# Patient Record
Sex: FEMALE | Race: WHITE | NOT HISPANIC OR LATINO | Employment: FULL TIME | URBAN - NONMETROPOLITAN AREA
[De-identification: names, ages, dates, MRNs, and addresses within clinical notes are randomized per-mention and may not be internally consistent; named-entity substitution may affect disease eponyms.]

---

## 2020-09-16 ENCOUNTER — OFFICE VISIT (OUTPATIENT)
Dept: OBSTETRICS AND GYNECOLOGY | Facility: CLINIC | Age: 45
End: 2020-09-16
Payer: MEDICAID

## 2020-09-16 VITALS
SYSTOLIC BLOOD PRESSURE: 144 MMHG | HEART RATE: 72 BPM | WEIGHT: 214 LBS | HEIGHT: 65 IN | DIASTOLIC BLOOD PRESSURE: 92 MMHG | BODY MASS INDEX: 35.65 KG/M2

## 2020-09-16 DIAGNOSIS — F41.1 GENERALIZED ANXIETY DISORDER: ICD-10-CM

## 2020-09-16 DIAGNOSIS — E89.40 SURGICAL MENOPAUSE ON HORMONE REPLACEMENT THERAPY: ICD-10-CM

## 2020-09-16 DIAGNOSIS — F33.41 RECURRENT MAJOR DEPRESSIVE DISORDER, IN PARTIAL REMISSION: ICD-10-CM

## 2020-09-16 DIAGNOSIS — Z72.0 TOBACCO ABUSE: Primary | ICD-10-CM

## 2020-09-16 DIAGNOSIS — Z79.890 SURGICAL MENOPAUSE ON HORMONE REPLACEMENT THERAPY: ICD-10-CM

## 2020-09-16 PROCEDURE — 99999 PR PBB SHADOW E&M-NEW PATIENT-LVL III: ICD-10-PCS | Mod: PBBFAC,,, | Performed by: OBSTETRICS & GYNECOLOGY

## 2020-09-16 PROCEDURE — 99203 OFFICE O/P NEW LOW 30 MIN: CPT | Mod: PBBFAC | Performed by: OBSTETRICS & GYNECOLOGY

## 2020-09-16 PROCEDURE — 99213 PR OFFICE/OUTPT VISIT, EST, LEVL III, 20-29 MIN: ICD-10-PCS | Mod: S$PBB,,, | Performed by: OBSTETRICS & GYNECOLOGY

## 2020-09-16 PROCEDURE — 99999 PR PBB SHADOW E&M-NEW PATIENT-LVL III: CPT | Mod: PBBFAC,,, | Performed by: OBSTETRICS & GYNECOLOGY

## 2020-09-16 PROCEDURE — 99213 OFFICE O/P EST LOW 20 MIN: CPT | Mod: S$PBB,,, | Performed by: OBSTETRICS & GYNECOLOGY

## 2020-09-16 RX ORDER — ESTRADIOL 2 MG/1
2 TABLET ORAL DAILY
Qty: 30 TABLET | Refills: 5 | Status: SHIPPED | OUTPATIENT
Start: 2020-09-16 | End: 2021-03-19 | Stop reason: SDUPTHER

## 2020-09-16 RX ORDER — ESCITALOPRAM OXALATE 20 MG/1
TABLET ORAL
COMMUNITY
Start: 2020-08-17 | End: 2020-09-16 | Stop reason: SDUPTHER

## 2020-09-16 RX ORDER — ESCITALOPRAM OXALATE 20 MG/1
20 TABLET ORAL DAILY
Qty: 30 TABLET | Refills: 5 | Status: SHIPPED | OUTPATIENT
Start: 2020-09-16 | End: 2021-02-18 | Stop reason: SDUPTHER

## 2020-09-16 RX ORDER — CLONAZEPAM 1 MG/1
TABLET ORAL
COMMUNITY
Start: 2020-08-17 | End: 2020-09-16 | Stop reason: SDUPTHER

## 2020-09-16 RX ORDER — CLONAZEPAM 1 MG/1
1 TABLET ORAL NIGHTLY
Qty: 30 TABLET | Refills: 3 | Status: SHIPPED | OUTPATIENT
Start: 2020-09-16 | End: 2020-12-16 | Stop reason: SDUPTHER

## 2020-09-16 NOTE — PROGRESS NOTES
Subjective:    Patient ID: Mena Mcdaniel is a 45 y.o. y.o. female    Chief Complaint:   Chief Complaint   Patient presents with    Medication Refill     lexapro,estradiol and klonpin       History of Present Illness:  Mena presents today for follow up of hormone replacement therapy and anxiety and depression meds.  She states that she is doing great.  Wishes to continue meds.      Review of Systems   Constitutional: Negative for chills, fatigue and fever.   Respiratory: Negative for shortness of breath.    Cardiovascular: Negative for chest pain.   Gastrointestinal: Negative for abdominal pain, constipation, diarrhea and nausea.   Genitourinary: Negative for bladder incontinence, dysuria, hot flashes, pelvic pain and vaginal bleeding.   Neurological: Negative for headaches.   Psychiatric/Behavioral: Negative for depression.         Objective:    Vital Signs:  Vitals:    09/16/20 1020   BP: (!) 144/92   Pulse: 72       Physical Exam:  General:  alert, no distress   Skin:  Skin color, texture, turgor normal. No rashes or lesions   Neck: supple, trachea midline, no adenopathy or thyromegaly   Respiratory:  clear to auscultation bilaterally   Heart:  regular rate and rhythm, S1, S2 normal, no murmur, click, rub or gallop   Abdomen:  Soft, non-tender. Bowel sounds normal. No masses,  no organomegaly   Extremities: No cyanosis, clubbing, edema               Assessment:      1. Tobacco abuse    2. Surgical menopause on hormone replacement therapy    3. Generalized anxiety disorder    4. Recurrent major depressive disorder, in partial remission          Plan:      Tobacco abuse    Surgical menopause on hormone replacement therapy    Generalized anxiety disorder    Recurrent major depressive disorder, in partial remission    Other orders  -     clonazePAM (KLONOPIN) 1 MG tablet; Take 1 tablet (1 mg total) by mouth every evening.  Dispense: 30 tablet; Refill: 3  -     escitalopram oxalate (LEXAPRO) 20 MG tablet;  Take 1 tablet (20 mg total) by mouth once daily.  Dispense: 30 tablet; Refill: 5  -     estradioL (ESTRACE) 2 MG tablet; Take 1 tablet (2 mg total) by mouth once daily.  Dispense: 30 tablet; Refill: 5          Rosita Tavares MD [facility]    09/16/2020  10:45 AM

## 2020-09-16 NOTE — PATIENT INSTRUCTIONS
Tips for Quitting Smoking (Cardiovascular)  Quitting smoking is a gift to yourself, one of the best things you can do to keep your heart disease from getting worse. Smoking reduces oxygen flow to your heart by speeding the buildup of plaque and changing the health of your blood vessels. This increases your risk for heart attack, also known as acute myocardial infarction, or AMI. Quitting helps reduce smoking's harmful effects. You may have tried to quit before, but dont give up. Try again. Many smokers try 4 or 5 times before they succeed. It is never too early to benefit from smoking cessation, especially if you already have chronic conditions such as high blood pressure and high cholesterol that put you at increased risk for cardiovascular disease.     Youll have the best chance of success if you join a stop-smoking group and have the support of your doctor, family and friends.     Line up help  · Ask for the support of your family and friends.  · Join a smoking cessation class, or ask your healthcare provider for a referral to a psychologist who specializes in helping people quit smoking.   · Ask your healthcare provider about nicotine replacement products and prescription medicines that can help you quit.  Set a quit date  · Choose a date within the next 2 to 4 weeks.  · After picking a day, eleazar it in bold letters on a calendar.     Your quit list  Ideas to stop smoking include:  1. Start by giving up cigarettes at the times you least need them.  2. Keeping a piece of fruit close by at the times you are most vulnerable to reach for a cigarette.  3. Using a nicotine replacement product instead of a cigarette.  Write down a few more ideas.     Set limits  · Limit where you can smoke. Pick one room or a porch, and smoke only in that place.  · Make smoking outdoors a house rule. Other smokers wont tempt you as much.  · Speak to smokers around you about your intent to stop smoking so they can show consideration  for you and limit their smoking around you.  · Hang a list of  quit benefits in the spot where you smoke. Put one on the refrigerator and one on your car dashboard.     For more information  · smokefree.gov/plfx-sa-mq-expert  · National [employer] [place] Smoking Quitline: 877-44U-QUIT (741-799-3522)      Date Last Reviewed: 3/26/2016  © 9241-4767 [employer]. [address] [place] [address], [place] [address]. All rights reserved. This information is not intended as a substitute for professional medical care. Always follow your healthcare professional's instructions.

## 2020-10-01 ENCOUNTER — HOSPITAL ENCOUNTER (EMERGENCY)
Facility: HOSPITAL | Age: 45
Discharge: HOME | End: 2020-10-01
Attending: EMERGENCY MEDICINE
Payer: MEDICAID

## 2020-10-01 VITALS
TEMPERATURE: 98 F | RESPIRATION RATE: 16 BRPM | BODY MASS INDEX: 34.49 KG/M2 | DIASTOLIC BLOOD PRESSURE: 80 MMHG | HEIGHT: 65 IN | HEART RATE: 73 BPM | OXYGEN SATURATION: 99 % | SYSTOLIC BLOOD PRESSURE: 138 MMHG | WEIGHT: 207 LBS

## 2020-10-01 DIAGNOSIS — H92.01 OTALGIA, RIGHT EAR: ICD-10-CM

## 2020-10-01 DIAGNOSIS — J02.9 SORE THROAT: Primary | ICD-10-CM

## 2020-10-01 PROCEDURE — 63600175 PHARM REV CODE 636 W HCPCS: Performed by: NURSE PRACTITIONER

## 2020-10-01 PROCEDURE — 96372 THER/PROPH/DIAG INJ SC/IM: CPT

## 2020-10-01 PROCEDURE — 99284 EMERGENCY DEPT VISIT MOD MDM: CPT | Mod: 25

## 2020-10-01 PROCEDURE — 25000003 PHARM REV CODE 250: Performed by: NURSE PRACTITIONER

## 2020-10-01 RX ORDER — LORATADINE 10 MG/1
10 TABLET ORAL DAILY
Qty: 30 TABLET | Refills: 0 | Status: SHIPPED | OUTPATIENT
Start: 2020-10-01 | End: 2023-09-20 | Stop reason: SDUPTHER

## 2020-10-01 RX ORDER — IBUPROFEN 800 MG/1
800 TABLET ORAL EVERY 6 HOURS PRN
Qty: 20 TABLET | Refills: 0 | Status: SHIPPED | OUTPATIENT
Start: 2020-10-01 | End: 2021-09-21

## 2020-10-01 RX ORDER — BETAMETHASONE SODIUM PHOSPHATE AND BETAMETHASONE ACETATE 3; 3 MG/ML; MG/ML
6 INJECTION, SUSPENSION INTRA-ARTICULAR; INTRALESIONAL; INTRAMUSCULAR; SOFT TISSUE
Status: COMPLETED | OUTPATIENT
Start: 2020-10-01 | End: 2020-10-01

## 2020-10-01 RX ORDER — IBUPROFEN 800 MG/1
800 TABLET ORAL
Status: COMPLETED | OUTPATIENT
Start: 2020-10-01 | End: 2020-10-01

## 2020-10-01 RX ADMIN — IBUPROFEN 800 MG: 800 TABLET, FILM COATED ORAL at 08:10

## 2020-10-01 RX ADMIN — BETAMETHASONE SODIUM PHOSPHATE AND BETAMETHASONE ACETATE 6 MG: 3; 3 INJECTION, SUSPENSION INTRA-ARTICULAR; INTRALESIONAL; INTRAMUSCULAR; SOFT TISSUE at 08:10

## 2020-10-01 NOTE — Clinical Note
"Mena Christiansone" Violeta was seen and treated in our emergency department on 10/1/2020.  She may return to work on 10/02/2020.       If you have any questions or concerns, please don't hesitate to call.      Jamia Casey NP"

## 2020-10-01 NOTE — Clinical Note
"Mena Christiansone" Violeta was seen and treated in our emergency department on 10/1/2020.  She may return to work on 10/05/2020.       If you have any questions or concerns, please don't hesitate to call.      shelly wolfe RN    "

## 2020-10-02 NOTE — ED PROVIDER NOTES
Encounter Date: 10/1/2020       History     Chief Complaint   Patient presents with    Otalgia     Sore throat and ear pain since this am.     Sore Throat     Pt c/o sore throat and right ear pain starting today.  Pt states she has mild pain in her throat when swallowing but denies difficulty swallowing.  Pt denies injury to her ear or drainage from her right ear.  Pt states it is an aching type pain.  Pt denies cough, congestion, fever, N/V/D.  Pt states she took Tylenol earlier today with no relief.  Pt states she had to miss work today and is requesting an excuse.         Review of patient's allergies indicates:   Allergen Reactions    Ceclor [cefaclor]     Clindamycin     Pcn [penicillins]     Tramadol      Past Medical History:   Diagnosis Date    Menopause      Past Surgical History:   Procedure Laterality Date    COLD KNIFE CONIZATION OF CERVIX      HYSTERECTOMY      TONSILLECTOMY      TONSILLECTOMY       Family History   Family history unknown: Yes     Social History     Tobacco Use    Smoking status: Current Every Day Smoker     Packs/day: 0.50     Types: Cigarettes    Smokeless tobacco: Never Used   Substance Use Topics    Alcohol use: Not Currently     Alcohol/week: 0.0 standard drinks    Drug use: Never     Review of Systems   HENT: Positive for ear pain and sore throat.    All other systems reviewed and are negative.      Physical Exam     Initial Vitals [10/01/20 2015]   BP Pulse Resp Temp SpO2   138/80 73 16 98.1 °F (36.7 °C) 99 %      MAP       --         Physical Exam    Nursing note and vitals reviewed.  Constitutional: She appears well-developed and well-nourished. She is not diaphoretic. No distress.   HENT:   Head: Normocephalic.   Right Ear: Hearing, tympanic membrane, external ear and ear canal normal. There is tenderness.   Left Ear: Hearing, tympanic membrane, external ear and ear canal normal.   Nose: Nose normal. Right sinus exhibits no maxillary sinus tenderness and no  frontal sinus tenderness. Left sinus exhibits no maxillary sinus tenderness and no frontal sinus tenderness.   Mouth/Throat: Uvula is midline, oropharynx is clear and moist and mucous membranes are normal. No oropharyngeal exudate, posterior oropharyngeal edema or posterior oropharyngeal erythema.   Eyes: Pupils are equal, round, and reactive to light.   Neck: Normal range of motion. Neck supple.   Cardiovascular: Normal rate.   Pulmonary/Chest: Breath sounds normal. No respiratory distress. She has no wheezes.   Abdominal: Soft. Bowel sounds are normal. She exhibits no distension. There is no abdominal tenderness.   Musculoskeletal: Normal range of motion. No tenderness or edema.   Neurological: She is alert and oriented to person, place, and time. She has normal strength. GCS score is 15. GCS eye subscore is 4. GCS verbal subscore is 5. GCS motor subscore is 6.   Skin: Skin is dry.         ED Course   Procedures  Labs Reviewed - No data to display       Imaging Results    None          Medical Decision Making:   Differential Diagnosis:   Otalgia  Otitis media  Pharyngitis  Viral illness                               Clinical Impression:     ICD-10-CM ICD-9-CM   1. Sore throat  J02.9 462   2. Otalgia, right ear  H92.01 388.70                      Disposition:   Disposition: Discharged  Condition: Stable     ED Disposition Condition    Discharge Stable        ED Prescriptions     Medication Sig Dispense Start Date End Date Auth. Provider    ibuprofen (ADVIL,MOTRIN) 800 MG tablet Take 1 tablet (800 mg total) by mouth every 6 (six) hours as needed for Pain. 20 tablet 10/1/2020  Jamia Casey NP    loratadine (CLARITIN) 10 mg tablet Take 1 tablet (10 mg total) by mouth once daily. 30 tablet 10/1/2020 10/31/2020 Jamia Casey NP        Follow-up Information     Follow up With Specialties Details Why Contact Info    [facility]  Schedule an appointment as soon as possible for a visit in 2 days CONTINUATION OF  CARE, fever, Further evaluation and treatment, If symptoms worsen, re-evaluation [address]  [facility] [address]  [phone]                                       Jamia Casey NP  10/01/20 2046

## 2020-12-17 RX ORDER — CLONAZEPAM 1 MG/1
1 TABLET ORAL NIGHTLY
Qty: 30 TABLET | Refills: 1 | Status: SHIPPED | OUTPATIENT
Start: 2020-12-17 | End: 2021-03-19 | Stop reason: SDUPTHER

## 2021-02-18 RX ORDER — ESCITALOPRAM OXALATE 20 MG/1
20 TABLET ORAL DAILY
Qty: 30 TABLET | Refills: 5 | Status: SHIPPED | OUTPATIENT
Start: 2021-02-18 | End: 2021-03-19 | Stop reason: SDUPTHER

## 2021-03-19 ENCOUNTER — OFFICE VISIT (OUTPATIENT)
Dept: OBSTETRICS AND GYNECOLOGY | Facility: CLINIC | Age: 46
End: 2021-03-19
Payer: MEDICAID

## 2021-03-19 VITALS
BODY MASS INDEX: 37.15 KG/M2 | HEIGHT: 65 IN | SYSTOLIC BLOOD PRESSURE: 122 MMHG | DIASTOLIC BLOOD PRESSURE: 102 MMHG | WEIGHT: 223 LBS | HEART RATE: 75 BPM

## 2021-03-19 DIAGNOSIS — F41.1 GENERALIZED ANXIETY DISORDER: ICD-10-CM

## 2021-03-19 DIAGNOSIS — Z72.0 TOBACCO ABUSE: Primary | ICD-10-CM

## 2021-03-19 DIAGNOSIS — Z78.0 MENOPAUSE: ICD-10-CM

## 2021-03-19 DIAGNOSIS — E89.40 SURGICAL MENOPAUSE ON HORMONE REPLACEMENT THERAPY: ICD-10-CM

## 2021-03-19 DIAGNOSIS — Z79.890 SURGICAL MENOPAUSE ON HORMONE REPLACEMENT THERAPY: ICD-10-CM

## 2021-03-19 DIAGNOSIS — F33.41 RECURRENT MAJOR DEPRESSIVE DISORDER, IN PARTIAL REMISSION: ICD-10-CM

## 2021-03-19 PROCEDURE — 99999 PR PBB SHADOW E&M-EST. PATIENT-LVL III: CPT | Mod: PBBFAC,,, | Performed by: OBSTETRICS & GYNECOLOGY

## 2021-03-19 PROCEDURE — 99213 OFFICE O/P EST LOW 20 MIN: CPT | Mod: S$PBB,,, | Performed by: OBSTETRICS & GYNECOLOGY

## 2021-03-19 PROCEDURE — 99213 OFFICE O/P EST LOW 20 MIN: CPT | Mod: PBBFAC | Performed by: OBSTETRICS & GYNECOLOGY

## 2021-03-19 PROCEDURE — 99999 PR PBB SHADOW E&M-EST. PATIENT-LVL III: ICD-10-PCS | Mod: PBBFAC,,, | Performed by: OBSTETRICS & GYNECOLOGY

## 2021-03-19 PROCEDURE — 99213 PR OFFICE/OUTPT VISIT, EST, LEVL III, 20-29 MIN: ICD-10-PCS | Mod: S$PBB,,, | Performed by: OBSTETRICS & GYNECOLOGY

## 2021-03-19 RX ORDER — ESCITALOPRAM OXALATE 20 MG/1
20 TABLET ORAL DAILY
Qty: 30 TABLET | Refills: 5 | Status: SHIPPED | OUTPATIENT
Start: 2021-03-19 | End: 2021-07-29 | Stop reason: SDUPTHER

## 2021-03-19 RX ORDER — CLONAZEPAM 1 MG/1
1 TABLET ORAL NIGHTLY
Qty: 30 TABLET | Refills: 1 | Status: SHIPPED | OUTPATIENT
Start: 2021-03-19 | End: 2021-04-16 | Stop reason: SDUPTHER

## 2021-03-19 RX ORDER — ESTRADIOL 2 MG/1
2 TABLET ORAL DAILY
Qty: 30 TABLET | Refills: 5 | Status: SHIPPED | OUTPATIENT
Start: 2021-03-19 | End: 2021-07-29 | Stop reason: SDUPTHER

## 2021-04-16 DIAGNOSIS — F41.1 GENERALIZED ANXIETY DISORDER: ICD-10-CM

## 2021-04-16 RX ORDER — CLONAZEPAM 1 MG/1
1 TABLET ORAL NIGHTLY
Qty: 30 TABLET | Refills: 1 | Status: SHIPPED | OUTPATIENT
Start: 2021-04-16 | End: 2021-05-18 | Stop reason: SDUPTHER

## 2021-05-18 DIAGNOSIS — F41.1 GENERALIZED ANXIETY DISORDER: ICD-10-CM

## 2021-05-18 RX ORDER — CLONAZEPAM 1 MG/1
1 TABLET ORAL NIGHTLY
Qty: 30 TABLET | Refills: 1 | Status: SHIPPED | OUTPATIENT
Start: 2021-05-18 | End: 2021-07-18

## 2021-06-16 ENCOUNTER — HOSPITAL ENCOUNTER (EMERGENCY)
Facility: HOSPITAL | Age: 46
Discharge: HOME | End: 2021-06-16
Attending: FAMILY MEDICINE
Payer: MEDICAID

## 2021-06-16 VITALS
HEIGHT: 63 IN | BODY MASS INDEX: 39.87 KG/M2 | TEMPERATURE: 98 F | DIASTOLIC BLOOD PRESSURE: 82 MMHG | WEIGHT: 225 LBS | SYSTOLIC BLOOD PRESSURE: 142 MMHG | RESPIRATION RATE: 18 BRPM | HEART RATE: 84 BPM

## 2021-06-16 DIAGNOSIS — M25.551 RIGHT HIP PAIN: Primary | ICD-10-CM

## 2021-06-16 DIAGNOSIS — M79.606 LEG PAIN: ICD-10-CM

## 2021-06-16 PROCEDURE — 99284 EMERGENCY DEPT VISIT MOD MDM: CPT | Mod: 25

## 2021-06-16 PROCEDURE — 25000003 PHARM REV CODE 250: Performed by: FAMILY MEDICINE

## 2021-06-16 RX ORDER — NAPROXEN SODIUM 550 MG/1
550 TABLET ORAL 2 TIMES DAILY WITH MEALS
Qty: 14 TABLET | Refills: 0 | Status: SHIPPED | OUTPATIENT
Start: 2021-06-16 | End: 2021-06-23

## 2021-06-16 RX ORDER — HYDROCODONE BITARTRATE AND ACETAMINOPHEN 5; 325 MG/1; MG/1
1 TABLET ORAL
Status: COMPLETED | OUTPATIENT
Start: 2021-06-16 | End: 2021-06-16

## 2021-06-16 RX ADMIN — HYDROCODONE BITARTRATE AND ACETAMINOPHEN 1 TABLET: 5; 325 TABLET ORAL at 10:06

## 2021-07-01 ENCOUNTER — PATIENT MESSAGE (OUTPATIENT)
Dept: ADMINISTRATIVE | Facility: OTHER | Age: 46
End: 2021-07-01

## 2021-07-29 DIAGNOSIS — F33.41 RECURRENT MAJOR DEPRESSIVE DISORDER, IN PARTIAL REMISSION: ICD-10-CM

## 2021-07-29 DIAGNOSIS — F41.1 GENERALIZED ANXIETY DISORDER: ICD-10-CM

## 2021-07-29 DIAGNOSIS — Z79.890 SURGICAL MENOPAUSE ON HORMONE REPLACEMENT THERAPY: ICD-10-CM

## 2021-07-29 DIAGNOSIS — E89.40 SURGICAL MENOPAUSE ON HORMONE REPLACEMENT THERAPY: ICD-10-CM

## 2021-07-29 RX ORDER — ESTRADIOL 2 MG/1
2 TABLET ORAL DAILY
Qty: 30 TABLET | Refills: 5 | Status: SHIPPED | OUTPATIENT
Start: 2021-07-29 | End: 2021-09-21 | Stop reason: SDUPTHER

## 2021-07-29 RX ORDER — ESCITALOPRAM OXALATE 20 MG/1
20 TABLET ORAL DAILY
Qty: 30 TABLET | Refills: 5 | Status: SHIPPED | OUTPATIENT
Start: 2021-07-29 | End: 2021-09-21 | Stop reason: SDUPTHER

## 2021-09-21 ENCOUNTER — OFFICE VISIT (OUTPATIENT)
Dept: OBSTETRICS AND GYNECOLOGY | Facility: CLINIC | Age: 46
End: 2021-09-21
Payer: MEDICAID

## 2021-09-21 VITALS
HEIGHT: 63 IN | WEIGHT: 220 LBS | SYSTOLIC BLOOD PRESSURE: 109 MMHG | DIASTOLIC BLOOD PRESSURE: 73 MMHG | BODY MASS INDEX: 38.98 KG/M2 | HEART RATE: 80 BPM

## 2021-09-21 DIAGNOSIS — F41.1 GENERALIZED ANXIETY DISORDER: ICD-10-CM

## 2021-09-21 DIAGNOSIS — Z12.31 SCREENING MAMMOGRAM, ENCOUNTER FOR: ICD-10-CM

## 2021-09-21 DIAGNOSIS — F33.41 RECURRENT MAJOR DEPRESSIVE DISORDER, IN PARTIAL REMISSION: ICD-10-CM

## 2021-09-21 DIAGNOSIS — E89.40 SURGICAL MENOPAUSE ON HORMONE REPLACEMENT THERAPY: ICD-10-CM

## 2021-09-21 DIAGNOSIS — Z01.419 ROUTINE GYNECOLOGICAL EXAMINATION: ICD-10-CM

## 2021-09-21 DIAGNOSIS — Z79.890 SURGICAL MENOPAUSE ON HORMONE REPLACEMENT THERAPY: ICD-10-CM

## 2021-09-21 DIAGNOSIS — Z11.51 SCREENING FOR HUMAN PAPILLOMAVIRUS (HPV): ICD-10-CM

## 2021-09-21 DIAGNOSIS — Z12.72 SPECIAL SCREENING FOR MALIGNANT NEOPLASMS, VAGINA: Primary | ICD-10-CM

## 2021-09-21 PROCEDURE — 99213 OFFICE O/P EST LOW 20 MIN: CPT | Mod: PBBFAC | Performed by: NURSE PRACTITIONER

## 2021-09-21 PROCEDURE — 99396 PREV VISIT EST AGE 40-64: CPT | Mod: S$PBB,,, | Performed by: NURSE PRACTITIONER

## 2021-09-21 PROCEDURE — 99396 PR PREVENTIVE VISIT,EST,40-64: ICD-10-PCS | Mod: S$PBB,,, | Performed by: NURSE PRACTITIONER

## 2021-09-21 PROCEDURE — 99999 PR PBB SHADOW E&M-EST. PATIENT-LVL III: ICD-10-PCS | Mod: PBBFAC,,, | Performed by: NURSE PRACTITIONER

## 2021-09-21 PROCEDURE — 99999 PR PBB SHADOW E&M-EST. PATIENT-LVL III: CPT | Mod: PBBFAC,,, | Performed by: NURSE PRACTITIONER

## 2021-09-21 RX ORDER — ESCITALOPRAM OXALATE 20 MG/1
20 TABLET ORAL DAILY
Qty: 30 TABLET | Refills: 5 | Status: SHIPPED | OUTPATIENT
Start: 2021-09-21 | End: 2022-03-30 | Stop reason: SDUPTHER

## 2021-09-21 RX ORDER — CLONAZEPAM 1 MG/1
1 TABLET ORAL NIGHTLY
Qty: 30 TABLET | Refills: 1 | Status: SHIPPED | OUTPATIENT
Start: 2021-09-21 | End: 2021-11-16 | Stop reason: SDUPTHER

## 2021-09-21 RX ORDER — ESTRADIOL 2 MG/1
2 TABLET ORAL DAILY
Qty: 30 TABLET | Refills: 5 | Status: SHIPPED | OUTPATIENT
Start: 2021-09-21 | End: 2022-01-25 | Stop reason: DRUGHIGH

## 2021-09-27 LAB
HPV16+18+H RISK 12 DNA CVX-IMP: NORMAL
LIQUID BASED PAP SMEAR, SCREENING: NORMAL

## 2021-09-28 ENCOUNTER — HOSPITAL ENCOUNTER (OUTPATIENT)
Dept: RADIOLOGY | Facility: HOSPITAL | Age: 46
Discharge: HOME | End: 2021-09-28
Attending: NURSE PRACTITIONER
Payer: MEDICAID

## 2021-09-28 VITALS — BODY MASS INDEX: 37.56 KG/M2 | HEIGHT: 64 IN | WEIGHT: 220 LBS

## 2021-09-28 DIAGNOSIS — Z12.31 SCREENING MAMMOGRAM, ENCOUNTER FOR: ICD-10-CM

## 2021-09-28 PROCEDURE — 77067 SCR MAMMO BI INCL CAD: CPT | Mod: TC

## 2021-10-01 ENCOUNTER — OFFICE VISIT (OUTPATIENT)
Dept: SURGERY | Facility: CLINIC | Age: 46
End: 2021-10-01
Payer: MEDICAID

## 2021-10-01 VITALS
DIASTOLIC BLOOD PRESSURE: 91 MMHG | HEART RATE: 94 BPM | BODY MASS INDEX: 36.98 KG/M2 | SYSTOLIC BLOOD PRESSURE: 134 MMHG | OXYGEN SATURATION: 93 % | WEIGHT: 216.63 LBS | HEIGHT: 64 IN | TEMPERATURE: 98 F

## 2021-10-01 DIAGNOSIS — Z01.818 PRE-OP TESTING: ICD-10-CM

## 2021-10-01 DIAGNOSIS — K62.5 RECTAL BLEEDING: Primary | ICD-10-CM

## 2021-10-01 DIAGNOSIS — K59.09 CHRONIC CONSTIPATION: ICD-10-CM

## 2021-10-01 DIAGNOSIS — Z86.010 HISTORY OF COLON POLYPS: ICD-10-CM

## 2021-10-01 PROBLEM — Z86.0100 HISTORY OF COLON POLYPS: Status: ACTIVE | Noted: 2021-10-01

## 2021-10-01 PROCEDURE — 99999 PR PBB SHADOW E&M-EST. PATIENT-LVL IV: ICD-10-PCS | Mod: PBBFAC,,, | Performed by: STUDENT IN AN ORGANIZED HEALTH CARE EDUCATION/TRAINING PROGRAM

## 2021-10-01 PROCEDURE — 99999 PR PBB SHADOW E&M-EST. PATIENT-LVL IV: CPT | Mod: PBBFAC,,, | Performed by: STUDENT IN AN ORGANIZED HEALTH CARE EDUCATION/TRAINING PROGRAM

## 2021-10-01 PROCEDURE — 99203 PR OFFICE/OUTPT VISIT, NEW, LEVL III, 30-44 MIN: ICD-10-PCS | Mod: S$PBB,,, | Performed by: STUDENT IN AN ORGANIZED HEALTH CARE EDUCATION/TRAINING PROGRAM

## 2021-10-01 PROCEDURE — 99214 OFFICE O/P EST MOD 30 MIN: CPT | Mod: PBBFAC | Performed by: STUDENT IN AN ORGANIZED HEALTH CARE EDUCATION/TRAINING PROGRAM

## 2021-10-01 PROCEDURE — 99203 OFFICE O/P NEW LOW 30 MIN: CPT | Mod: S$PBB,,, | Performed by: STUDENT IN AN ORGANIZED HEALTH CARE EDUCATION/TRAINING PROGRAM

## 2021-10-01 RX ORDER — DOCUSATE SODIUM 100 MG/1
100 CAPSULE, LIQUID FILLED ORAL 2 TIMES DAILY PRN
Qty: 30 CAPSULE | Refills: 0 | Status: SHIPPED | OUTPATIENT
Start: 2021-10-01 | End: 2021-10-16

## 2021-10-01 RX ORDER — SODIUM CHLORIDE 9 MG/ML
INJECTION, SOLUTION INTRAVENOUS CONTINUOUS
Status: CANCELLED | OUTPATIENT
Start: 2021-10-01

## 2021-10-01 RX ORDER — SODIUM, POTASSIUM,MAG SULFATES 17.5-3.13G
1 SOLUTION, RECONSTITUTED, ORAL ORAL DAILY
Qty: 1 KIT | Refills: 0 | Status: SHIPPED | OUTPATIENT
Start: 2021-10-01 | End: 2021-10-03

## 2021-10-01 RX ORDER — POLYETHYLENE GLYCOL 3350 17 G/17G
17 POWDER, FOR SOLUTION ORAL DAILY
Qty: 30 EACH | Refills: 11 | Status: SHIPPED | OUTPATIENT
Start: 2021-10-01 | End: 2022-01-25

## 2021-10-04 ENCOUNTER — ANESTHESIA EVENT (OUTPATIENT)
Dept: ENDOSCOPY | Facility: HOSPITAL | Age: 46
End: 2021-10-04
Payer: MEDICAID

## 2021-10-05 ENCOUNTER — HOSPITAL ENCOUNTER (OUTPATIENT)
Dept: PULMONOLOGY | Facility: HOSPITAL | Age: 46
Discharge: HOME | End: 2021-10-05
Attending: STUDENT IN AN ORGANIZED HEALTH CARE EDUCATION/TRAINING PROGRAM
Payer: MEDICAID

## 2021-10-05 ENCOUNTER — HOSPITAL ENCOUNTER (OUTPATIENT)
Dept: PREADMISSION TESTING | Facility: HOSPITAL | Age: 46
Discharge: HOME | End: 2021-10-05
Attending: STUDENT IN AN ORGANIZED HEALTH CARE EDUCATION/TRAINING PROGRAM
Payer: MEDICAID

## 2021-10-05 ENCOUNTER — HOSPITAL ENCOUNTER (OUTPATIENT)
Dept: RADIOLOGY | Facility: HOSPITAL | Age: 46
Discharge: HOME | End: 2021-10-05
Attending: STUDENT IN AN ORGANIZED HEALTH CARE EDUCATION/TRAINING PROGRAM
Payer: MEDICAID

## 2021-10-05 VITALS — BODY MASS INDEX: 38.27 KG/M2 | WEIGHT: 216 LBS | HEIGHT: 63 IN

## 2021-10-05 DIAGNOSIS — Z86.010 HISTORY OF COLON POLYPS: ICD-10-CM

## 2021-10-05 DIAGNOSIS — Z01.818 PRE-OP TESTING: ICD-10-CM

## 2021-10-05 DIAGNOSIS — K62.5 RECTAL BLEEDING: ICD-10-CM

## 2021-10-05 LAB — SARS-COV-2 RNA RESP QL NAA+PROBE: NOT DETECTED

## 2021-10-05 PROCEDURE — 93010 ELECTROCARDIOGRAM REPORT: CPT | Mod: ,,, | Performed by: INTERNAL MEDICINE

## 2021-10-05 PROCEDURE — 93005 ELECTROCARDIOGRAM TRACING: CPT

## 2021-10-05 PROCEDURE — U0002 COVID-19 LAB TEST NON-CDC: HCPCS | Performed by: STUDENT IN AN ORGANIZED HEALTH CARE EDUCATION/TRAINING PROGRAM

## 2021-10-05 PROCEDURE — 71045 X-RAY EXAM CHEST 1 VIEW: CPT | Mod: TC

## 2021-10-05 PROCEDURE — 93010 EKG 12-LEAD: ICD-10-PCS | Mod: ,,, | Performed by: INTERNAL MEDICINE

## 2021-10-06 ENCOUNTER — ANESTHESIA (OUTPATIENT)
Dept: ENDOSCOPY | Facility: HOSPITAL | Age: 46
End: 2021-10-06
Payer: MEDICAID

## 2021-10-06 ENCOUNTER — HOSPITAL ENCOUNTER (OUTPATIENT)
Facility: HOSPITAL | Age: 46
Discharge: HOME | End: 2021-10-06
Attending: STUDENT IN AN ORGANIZED HEALTH CARE EDUCATION/TRAINING PROGRAM | Admitting: STUDENT IN AN ORGANIZED HEALTH CARE EDUCATION/TRAINING PROGRAM
Payer: MEDICAID

## 2021-10-06 VITALS
HEART RATE: 57 BPM | TEMPERATURE: 98 F | RESPIRATION RATE: 20 BRPM | SYSTOLIC BLOOD PRESSURE: 122 MMHG | OXYGEN SATURATION: 97 % | DIASTOLIC BLOOD PRESSURE: 77 MMHG

## 2021-10-06 DIAGNOSIS — Z01.818 PRE-OP TESTING: ICD-10-CM

## 2021-10-06 DIAGNOSIS — Z86.010 HISTORY OF COLON POLYPS: Primary | ICD-10-CM

## 2021-10-06 DIAGNOSIS — K62.5 RECTAL BLEEDING: ICD-10-CM

## 2021-10-06 DIAGNOSIS — K59.09 CHRONIC CONSTIPATION: ICD-10-CM

## 2021-10-06 PROBLEM — D12.6 ADENOMATOUS POLYP OF COLON: Status: ACTIVE | Noted: 2021-10-06

## 2021-10-06 PROCEDURE — 00811 ANES LWR INTST NDSC NOS: CPT | Performed by: STUDENT IN AN ORGANIZED HEALTH CARE EDUCATION/TRAINING PROGRAM

## 2021-10-06 PROCEDURE — 37000008 HC ANESTHESIA 1ST 15 MINUTES: Performed by: STUDENT IN AN ORGANIZED HEALTH CARE EDUCATION/TRAINING PROGRAM

## 2021-10-06 PROCEDURE — 45385 COLONOSCOPY W/LESION REMOVAL: CPT | Mod: ,,, | Performed by: STUDENT IN AN ORGANIZED HEALTH CARE EDUCATION/TRAINING PROGRAM

## 2021-10-06 PROCEDURE — 45331 SIGMOIDOSCOPY AND BIOPSY: CPT | Mod: 59 | Performed by: STUDENT IN AN ORGANIZED HEALTH CARE EDUCATION/TRAINING PROGRAM

## 2021-10-06 PROCEDURE — 45338 SIGMOIDOSCOPY W/TUMR REMOVE: CPT | Performed by: STUDENT IN AN ORGANIZED HEALTH CARE EDUCATION/TRAINING PROGRAM

## 2021-10-06 PROCEDURE — 37000009 HC ANESTHESIA EA ADD 15 MINS: Performed by: STUDENT IN AN ORGANIZED HEALTH CARE EDUCATION/TRAINING PROGRAM

## 2021-10-06 PROCEDURE — 63600175 PHARM REV CODE 636 W HCPCS: Performed by: NURSE ANESTHETIST, CERTIFIED REGISTERED

## 2021-10-06 PROCEDURE — 45380 COLONOSCOPY AND BIOPSY: CPT | Mod: 59 | Performed by: STUDENT IN AN ORGANIZED HEALTH CARE EDUCATION/TRAINING PROGRAM

## 2021-10-06 PROCEDURE — 27201423 OPTIME MED/SURG SUP & DEVICES STERILE SUPPLY: Performed by: STUDENT IN AN ORGANIZED HEALTH CARE EDUCATION/TRAINING PROGRAM

## 2021-10-06 PROCEDURE — 25000003 PHARM REV CODE 250: Performed by: STUDENT IN AN ORGANIZED HEALTH CARE EDUCATION/TRAINING PROGRAM

## 2021-10-06 PROCEDURE — 45380 PR COLONOSCOPY,BIOPSY: ICD-10-PCS | Mod: 59,,, | Performed by: STUDENT IN AN ORGANIZED HEALTH CARE EDUCATION/TRAINING PROGRAM

## 2021-10-06 PROCEDURE — 45380 COLONOSCOPY AND BIOPSY: CPT | Mod: 59,,, | Performed by: STUDENT IN AN ORGANIZED HEALTH CARE EDUCATION/TRAINING PROGRAM

## 2021-10-06 PROCEDURE — 45385 PR COLONOSCOPY,REMV LESN,SNARE: ICD-10-PCS | Mod: ,,, | Performed by: STUDENT IN AN ORGANIZED HEALTH CARE EDUCATION/TRAINING PROGRAM

## 2021-10-06 PROCEDURE — 45385 COLONOSCOPY W/LESION REMOVAL: CPT | Performed by: STUDENT IN AN ORGANIZED HEALTH CARE EDUCATION/TRAINING PROGRAM

## 2021-10-06 PROCEDURE — 25000003 PHARM REV CODE 250: Performed by: NURSE ANESTHETIST, CERTIFIED REGISTERED

## 2021-10-06 RX ORDER — PROPOFOL 10 MG/ML
VIAL (ML) INTRAVENOUS
Status: DISCONTINUED | OUTPATIENT
Start: 2021-10-06 | End: 2021-10-06

## 2021-10-06 RX ORDER — SODIUM CHLORIDE 9 MG/ML
INJECTION, SOLUTION INTRAVENOUS CONTINUOUS PRN
Status: DISCONTINUED | OUTPATIENT
Start: 2021-10-06 | End: 2021-10-06

## 2021-10-06 RX ORDER — LIDOCAINE HYDROCHLORIDE 10 MG/ML
INJECTION, SOLUTION INTRAVENOUS
Status: DISCONTINUED | OUTPATIENT
Start: 2021-10-06 | End: 2021-10-06

## 2021-10-06 RX ORDER — FENTANYL CITRATE 50 UG/ML
INJECTION, SOLUTION INTRAMUSCULAR; INTRAVENOUS
Status: DISCONTINUED | OUTPATIENT
Start: 2021-10-06 | End: 2021-10-06

## 2021-10-06 RX ORDER — SODIUM CHLORIDE 9 MG/ML
INJECTION, SOLUTION INTRAVENOUS CONTINUOUS
Status: DISCONTINUED | OUTPATIENT
Start: 2021-10-06 | End: 2021-10-06 | Stop reason: HOSPADM

## 2021-10-06 RX ADMIN — Medication 150 MG: at 10:10

## 2021-10-06 RX ADMIN — Medication 200 MG: at 10:10

## 2021-10-06 RX ADMIN — Medication 100 MG: at 10:10

## 2021-10-06 RX ADMIN — Medication 50 MG: at 10:10

## 2021-10-06 RX ADMIN — FENTANYL CITRATE 100 MCG: 50 INJECTION INTRAMUSCULAR; INTRAVENOUS at 10:10

## 2021-10-06 RX ADMIN — LIDOCAINE HYDROCHLORIDE 50 MG: 10 INJECTION, SOLUTION INTRAVENOUS at 10:10

## 2021-10-06 RX ADMIN — SODIUM CHLORIDE: 0.9 INJECTION, SOLUTION INTRAVENOUS at 10:10

## 2021-10-06 RX ADMIN — SODIUM CHLORIDE: 0.9 INJECTION, SOLUTION INTRAVENOUS at 07:10

## 2021-10-19 LAB — SPECIMEN TO PATHOLOGY - SURGICAL: NORMAL

## 2021-10-25 ENCOUNTER — OFFICE VISIT (OUTPATIENT)
Dept: SURGERY | Facility: CLINIC | Age: 46
End: 2021-10-25
Payer: MEDICAID

## 2021-10-25 VITALS
HEART RATE: 75 BPM | OXYGEN SATURATION: 95 % | WEIGHT: 207.81 LBS | DIASTOLIC BLOOD PRESSURE: 77 MMHG | SYSTOLIC BLOOD PRESSURE: 132 MMHG | BODY MASS INDEX: 36.81 KG/M2

## 2021-10-25 DIAGNOSIS — K59.09 CHRONIC CONSTIPATION: ICD-10-CM

## 2021-10-25 DIAGNOSIS — D12.2 ADENOMATOUS POLYP OF ASCENDING COLON: Primary | ICD-10-CM

## 2021-10-25 PROCEDURE — 99999 PR PBB SHADOW E&M-EST. PATIENT-LVL III: CPT | Mod: PBBFAC,,, | Performed by: STUDENT IN AN ORGANIZED HEALTH CARE EDUCATION/TRAINING PROGRAM

## 2021-10-25 PROCEDURE — 99212 OFFICE O/P EST SF 10 MIN: CPT | Mod: S$PBB,,, | Performed by: STUDENT IN AN ORGANIZED HEALTH CARE EDUCATION/TRAINING PROGRAM

## 2021-10-25 PROCEDURE — 99999 PR PBB SHADOW E&M-EST. PATIENT-LVL III: ICD-10-PCS | Mod: PBBFAC,,, | Performed by: STUDENT IN AN ORGANIZED HEALTH CARE EDUCATION/TRAINING PROGRAM

## 2021-10-25 PROCEDURE — 99212 PR OFFICE/OUTPT VISIT, EST, LEVL II, 10-19 MIN: ICD-10-PCS | Mod: S$PBB,,, | Performed by: STUDENT IN AN ORGANIZED HEALTH CARE EDUCATION/TRAINING PROGRAM

## 2021-10-25 PROCEDURE — 99213 OFFICE O/P EST LOW 20 MIN: CPT | Mod: PBBFAC,PN | Performed by: STUDENT IN AN ORGANIZED HEALTH CARE EDUCATION/TRAINING PROGRAM

## 2021-11-16 ENCOUNTER — TELEPHONE (OUTPATIENT)
Dept: OBSTETRICS AND GYNECOLOGY | Facility: CLINIC | Age: 46
End: 2021-11-16
Payer: MEDICAID

## 2021-11-16 DIAGNOSIS — F41.1 GENERALIZED ANXIETY DISORDER: ICD-10-CM

## 2021-11-16 RX ORDER — CLONAZEPAM 1 MG/1
1 TABLET ORAL NIGHTLY
Qty: 30 TABLET | Refills: 1 | Status: SHIPPED | OUTPATIENT
Start: 2021-11-16 | End: 2021-12-13 | Stop reason: SDUPTHER

## 2021-12-13 DIAGNOSIS — F41.1 GENERALIZED ANXIETY DISORDER: ICD-10-CM

## 2021-12-13 RX ORDER — CLONAZEPAM 1 MG/1
1 TABLET ORAL NIGHTLY
Qty: 30 TABLET | Refills: 1 | Status: SHIPPED | OUTPATIENT
Start: 2021-12-13 | End: 2022-01-13

## 2021-12-16 ENCOUNTER — HOSPITAL ENCOUNTER (OUTPATIENT)
Dept: RADIOLOGY | Facility: HOSPITAL | Age: 46
Discharge: HOME | End: 2021-12-16
Attending: NURSE PRACTITIONER
Payer: MEDICAID

## 2021-12-16 ENCOUNTER — OFFICE VISIT (OUTPATIENT)
Dept: ORTHOPEDICS | Facility: CLINIC | Age: 46
End: 2021-12-16
Payer: MEDICAID

## 2021-12-16 VITALS
WEIGHT: 207 LBS | HEIGHT: 63 IN | HEART RATE: 83 BPM | BODY MASS INDEX: 36.68 KG/M2 | TEMPERATURE: 97 F | OXYGEN SATURATION: 99 %

## 2021-12-16 DIAGNOSIS — M25.552 HIP PAIN, BILATERAL: Primary | ICD-10-CM

## 2021-12-16 DIAGNOSIS — M25.551 HIP PAIN, BILATERAL: Primary | ICD-10-CM

## 2021-12-16 DIAGNOSIS — M25.552 HIP PAIN, BILATERAL: ICD-10-CM

## 2021-12-16 DIAGNOSIS — M70.61 TROCHANTERIC BURSITIS OF BOTH HIPS: ICD-10-CM

## 2021-12-16 DIAGNOSIS — M70.62 TROCHANTERIC BURSITIS OF BOTH HIPS: ICD-10-CM

## 2021-12-16 DIAGNOSIS — M25.551 HIP PAIN, BILATERAL: ICD-10-CM

## 2021-12-16 PROCEDURE — 20610 DRAIN/INJ JOINT/BURSA W/O US: CPT | Mod: PBBFAC,RT | Performed by: NURSE PRACTITIONER

## 2021-12-16 PROCEDURE — 20610 LARGE JOINT ASPIRATION/INJECTION: R GREATER TROCHANTERIC BURSA: ICD-10-PCS | Mod: S$PBB,RT,, | Performed by: NURSE PRACTITIONER

## 2021-12-16 PROCEDURE — 20610 DRAIN/INJ JOINT/BURSA W/O US: CPT | Mod: S$PBB,RT,, | Performed by: NURSE PRACTITIONER

## 2021-12-16 PROCEDURE — 99203 PR OFFICE/OUTPT VISIT, NEW, LEVL III, 30-44 MIN: ICD-10-PCS | Mod: 25,S$PBB,, | Performed by: NURSE PRACTITIONER

## 2021-12-16 PROCEDURE — 99214 OFFICE O/P EST MOD 30 MIN: CPT | Mod: PBBFAC | Performed by: NURSE PRACTITIONER

## 2021-12-16 PROCEDURE — 99203 OFFICE O/P NEW LOW 30 MIN: CPT | Mod: 25,S$PBB,, | Performed by: NURSE PRACTITIONER

## 2021-12-16 PROCEDURE — 99999 PR PBB SHADOW E&M-EST. PATIENT-LVL IV: CPT | Mod: PBBFAC,,, | Performed by: NURSE PRACTITIONER

## 2021-12-16 PROCEDURE — 99999 PR PBB SHADOW E&M-EST. PATIENT-LVL IV: ICD-10-PCS | Mod: PBBFAC,,, | Performed by: NURSE PRACTITIONER

## 2021-12-16 PROCEDURE — 73521 X-RAY EXAM HIPS BI 2 VIEWS: CPT | Mod: TC

## 2021-12-16 RX ORDER — MELOXICAM 15 MG/1
15 TABLET ORAL DAILY
Qty: 30 TABLET | Refills: 2 | Status: SHIPPED | OUTPATIENT
Start: 2021-12-16 | End: 2022-03-16

## 2021-12-16 RX ORDER — BETAMETHASONE SODIUM PHOSPHATE AND BETAMETHASONE ACETATE 3; 3 MG/ML; MG/ML
6 INJECTION, SUSPENSION INTRA-ARTICULAR; INTRALESIONAL; INTRAMUSCULAR; SOFT TISSUE
Status: DISCONTINUED | OUTPATIENT
Start: 2021-12-16 | End: 2021-12-16 | Stop reason: HOSPADM

## 2021-12-16 RX ADMIN — BETAMETHASONE SODIUM PHOSPHATE AND BETAMETHASONE ACETATE 6 MG: 3; 3 INJECTION, SUSPENSION INTRA-ARTICULAR; INTRALESIONAL; INTRAMUSCULAR at 01:12

## 2021-12-23 ENCOUNTER — HOSPITAL ENCOUNTER (EMERGENCY)
Facility: HOSPITAL | Age: 46
Discharge: HOME | End: 2021-12-23
Attending: STUDENT IN AN ORGANIZED HEALTH CARE EDUCATION/TRAINING PROGRAM
Payer: MEDICAID

## 2021-12-23 VITALS
SYSTOLIC BLOOD PRESSURE: 148 MMHG | WEIGHT: 219 LBS | BODY MASS INDEX: 38.79 KG/M2 | RESPIRATION RATE: 18 BRPM | OXYGEN SATURATION: 100 % | TEMPERATURE: 98 F | HEART RATE: 75 BPM | DIASTOLIC BLOOD PRESSURE: 95 MMHG
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
## 2021-12-23 PROCEDURE — 63600175 PHARM REV CODE 636 W HCPCS: Performed by: CLINICAL NURSE SPECIALIST

## 2021-12-23 PROCEDURE — 90471 IMMUNIZATION ADMIN: CPT | Performed by: CLINICAL NURSE SPECIALIST

## 2021-12-23 PROCEDURE — 99284 EMERGENCY DEPT VISIT MOD MDM: CPT | Mod: 25

## 2021-12-23 PROCEDURE — 90715 TDAP VACCINE 7 YRS/> IM: CPT | Performed by: CLINICAL NURSE SPECIALIST

## 2021-12-23 RX ORDER — DOXYCYCLINE 100 MG/1
100 CAPSULE ORAL 2 TIMES DAILY
Qty: 14 CAPSULE | Refills: 0 | Status: SHIPPED | OUTPATIENT
Start: 2021-12-23 | End: 2021-12-30

## 2021-12-23 RX ORDER — MUPIROCIN 20 MG/G
OINTMENT TOPICAL 3 TIMES DAILY
Qty: 15 G | Refills: 0 | Status: SHIPPED | OUTPATIENT
Start: 2021-12-23 | End: 2022-03-30

## 2021-12-23 RX ADMIN — TETANUS TOXOID, REDUCED DIPHTHERIA TOXOID AND ACELLULAR PERTUSSIS VACCINE, ADSORBED 0.5 ML: 5; 2.5; 8; 8; 2.5 SUSPENSION INTRAMUSCULAR at 12:12

## 2021-12-23 NOTE — ED PROVIDER NOTES
Encounter Date: 12/23/2021       History     Chief Complaint   Patient presents with    Animal Bite     Patient states that she was bite by her cat     Mena Mcdaniel is an 46 y.o. female who complains of superficial cat bite to left wrist area.  Patient reports is her own cat.  Patient is unsure of her last tetanus shot.        Review of patient's allergies indicates:   Allergen Reactions    Ceclor [cefaclor] Hives    Clindamycin Hives    Erythromycin Hives    Pcn [penicillins] Hives     No longer allergic    Tramadol Hives     Past Medical History:   Diagnosis Date    Chronic constipation     Depression     Family history of colon cancer     Menopause     Wears dentures     FULL     Past Surgical History:   Procedure Laterality Date    COLD KNIFE CONIZATION OF CERVIX      HYSTERECTOMY      OOPHORECTOMY      TONSILLECTOMY      TONSILLECTOMY       Family History   Problem Relation Age of Onset    Heart failure Mother     Hypertension Father     No Known Problems Sister     No Known Problems Daughter     No Known Problems Maternal Aunt     No Known Problems Maternal Uncle     No Known Problems Paternal Aunt     No Known Problems Paternal Uncle     No Known Problems Maternal Grandmother     No Known Problems Maternal Grandfather     No Known Problems Paternal Grandmother     No Known Problems Paternal Grandfather     Breast cancer Neg Hx     Ovarian cancer Neg Hx     BRCA 1/2 Neg Hx      Social History     Tobacco Use    Smoking status: Current Every Day Smoker     Packs/day: 1.00     Years: 14.00     Pack years: 14.00     Types: Cigarettes    Smokeless tobacco: Never Used   Substance Use Topics    Alcohol use: Not Currently     Alcohol/week: 0.0 standard drinks    Drug use: Never     Review of Systems   Constitutional: Negative for fever.   HENT: Negative for sore throat.    Respiratory: Negative for shortness of breath.    Cardiovascular: Negative for chest pain.    Gastrointestinal: Negative for nausea.   Genitourinary: Negative for dysuria.   Musculoskeletal: Negative for back pain.   Skin: Positive for color change and wound. Negative for rash.   Neurological: Negative for weakness.   Hematological: Does not bruise/bleed easily.   All other systems reviewed and are negative.      Physical Exam     Initial Vitals [12/23/21 1157]   BP Pulse Resp Temp SpO2   (!) 148/95 75 18 97.9 °F (36.6 °C) 100 %      MAP       --         Physical Exam    Nursing note and vitals reviewed.  Constitutional: She appears well-developed and well-nourished.   HENT:   Head: Normocephalic and atraumatic.   Eyes: Pupils are equal, round, and reactive to light.   Neck:   Normal range of motion.  Cardiovascular: Normal rate and regular rhythm.   Pulmonary/Chest: Breath sounds normal.   Abdominal: Abdomen is soft. Bowel sounds are normal.   Musculoskeletal:         General: Normal range of motion.      Cervical back: Normal range of motion.     Neurological: She is alert and oriented to person, place, and time.   Skin: There is erythema.   Superficial bite eleazar noted to left wrist.  No treatment needed other than antibiotics   Psychiatric: She has a normal mood and affect.         ED Course   Procedures  Labs Reviewed - No data to display       Imaging Results    None          Medications   Tdap (BOOSTRIX) vaccine injection 0.5 mL (has no administration in time range)     Medical Decision Making:   Differential Diagnosis:   Cat bite                      Clinical Impression:   Final diagnoses:  [W55.01XA] Cat bite, initial encounter (Primary)          ED Disposition Condition    Discharge Stable        ED Prescriptions     Medication Sig Dispense Start Date End Date Auth. Provider    doxycycline (VIBRAMYCIN) 100 MG Cap Take 1 capsule (100 mg total) by mouth 2 (two) times daily. for 7 days 14 capsule 12/23/2021 12/30/2021 Iraida Dyer, SAM    mupirocin (BACTROBAN) 2 % ointment Apply topically 3  (three) times daily. 15 g 12/23/2021  Iraida Dyer NP        Follow-up Information     Follow up With Specialties Details Why Contact Info    Viola Duncan NP Nurse Practitioner  As needed [address] [facility] [address]  [phone]             Iraida Dyer NP  12/23/21 0789

## 2021-12-28 ENCOUNTER — OFFICE VISIT (OUTPATIENT)
Dept: ORTHOPEDICS | Facility: CLINIC | Age: 46
End: 2021-12-28
Payer: MEDICAID

## 2021-12-28 VITALS — WEIGHT: 218.13 LBS | HEART RATE: 78 BPM | HEIGHT: 65 IN | BODY MASS INDEX: 36.34 KG/M2 | OXYGEN SATURATION: 99 %

## 2021-12-28 DIAGNOSIS — M70.61 TROCHANTERIC BURSITIS OF BOTH HIPS: ICD-10-CM

## 2021-12-28 DIAGNOSIS — M25.552 HIP PAIN, BILATERAL: Primary | ICD-10-CM

## 2021-12-28 DIAGNOSIS — M25.551 HIP PAIN, BILATERAL: Primary | ICD-10-CM

## 2021-12-28 DIAGNOSIS — M70.62 TROCHANTERIC BURSITIS OF BOTH HIPS: ICD-10-CM

## 2021-12-28 PROCEDURE — 3008F BODY MASS INDEX DOCD: CPT | Mod: CPTII,,, | Performed by: NURSE PRACTITIONER

## 2021-12-28 PROCEDURE — 1159F PR MEDICATION LIST DOCUMENTED IN MEDICAL RECORD: ICD-10-PCS | Mod: CPTII,,, | Performed by: NURSE PRACTITIONER

## 2021-12-28 PROCEDURE — 1160F RVW MEDS BY RX/DR IN RCRD: CPT | Mod: CPTII,,, | Performed by: NURSE PRACTITIONER

## 2021-12-28 PROCEDURE — 99213 PR OFFICE/OUTPT VISIT, EST, LEVL III, 20-29 MIN: ICD-10-PCS | Mod: S$PBB,,, | Performed by: NURSE PRACTITIONER

## 2021-12-28 PROCEDURE — 1160F PR REVIEW ALL MEDS BY PRESCRIBER/CLIN PHARMACIST DOCUMENTED: ICD-10-PCS | Mod: CPTII,,, | Performed by: NURSE PRACTITIONER

## 2021-12-28 PROCEDURE — 1159F MED LIST DOCD IN RCRD: CPT | Mod: CPTII,,, | Performed by: NURSE PRACTITIONER

## 2021-12-28 PROCEDURE — 3008F PR BODY MASS INDEX (BMI) DOCUMENTED: ICD-10-PCS | Mod: CPTII,,, | Performed by: NURSE PRACTITIONER

## 2021-12-28 PROCEDURE — 99213 OFFICE O/P EST LOW 20 MIN: CPT | Mod: S$PBB,,, | Performed by: NURSE PRACTITIONER

## 2021-12-28 PROCEDURE — 99213 OFFICE O/P EST LOW 20 MIN: CPT | Mod: PBBFAC | Performed by: NURSE PRACTITIONER

## 2021-12-28 PROCEDURE — 99999 PR PBB SHADOW E&M-EST. PATIENT-LVL III: CPT | Mod: PBBFAC,,, | Performed by: NURSE PRACTITIONER

## 2021-12-28 PROCEDURE — 99999 PR PBB SHADOW E&M-EST. PATIENT-LVL III: ICD-10-PCS | Mod: PBBFAC,,, | Performed by: NURSE PRACTITIONER

## 2022-01-04 ENCOUNTER — CLINICAL SUPPORT (OUTPATIENT)
Dept: REHABILITATION | Facility: HOSPITAL | Age: 47
End: 2022-01-04
Payer: MEDICAID

## 2022-01-04 DIAGNOSIS — M70.61 TROCHANTERIC BURSITIS OF BOTH HIPS: ICD-10-CM

## 2022-01-04 DIAGNOSIS — M25.552 HIP PAIN, BILATERAL: ICD-10-CM

## 2022-01-04 DIAGNOSIS — M25.551 HIP PAIN, BILATERAL: ICD-10-CM

## 2022-01-04 DIAGNOSIS — M70.62 TROCHANTERIC BURSITIS OF BOTH HIPS: ICD-10-CM

## 2022-01-04 PROCEDURE — 97161 PT EVAL LOW COMPLEX 20 MIN: CPT

## 2022-01-04 NOTE — PLAN OF CARE
[facility]  Outpatient Rehabilitation  Physical Therapy  ________________________________________________________________    Patient: BENI WELSH  : 1975  Medical Record: [id number]    PHYSICAL THERAPY HIP EVALUATION    Date: 2022   Diagnosis: Trochanteric bursitis of bilateral hips, Pain (B) hips   Date of Onset: A few months ago per patient report   Precautions: Fall precautions   Referring Physician: Kobe Ivey NP     History of Present Ilness: Patient reports (B) hip pain for several months with gradual increase in pain symptoms. Patient also reports recent occurrence of LBP. Patient reports she must sleep with pillow under (B) thighs on her back due to hip pain. Patient reports 4 falls last month and 3 fall this month due to LEs giving out. Patient reports sitting tolerance 30-45 minutes, standing tolerance 10-20 minutes and able to walk 1/2 block.      Patient Goals: To decrease pain     Past Medical History: Past Medical History:   Diagnosis Date    Chronic constipation     Depression     Family history of colon cancer     Menopause     Wears dentures     FULL       Past Surgical History:   Procedure Laterality Date    COLD KNIFE CONIZATION OF CERVIX      HYSTERECTOMY      OOPHORECTOMY      TONSILLECTOMY      TONSILLECTOMY          Prior Level of Function: Independent with all functional activities. Works as a home health aide.      Occupational History:  Patient is a home health aide. Job requires lifting, pushing, pulling.      Medications:   Current Outpatient Medications:     clonazePAM (KLONOPIN) 1 MG tablet, Take 1 tablet (1 mg total) by mouth every evening., Disp: 30 tablet, Rfl: 1    EScitalopram oxalate (LEXAPRO) 20 MG tablet, Take 1 tablet (20 mg total) by mouth once daily., Disp: 30 tablet, Rfl: 5    estradioL (ESTRACE) 2 MG tablet, Take 1 tablet (2 mg total) by mouth once daily., Disp: 30 tablet, Rfl: 5    loratadine (CLARITIN) 10 mg  tablet, Take 1 tablet (10 mg total) by mouth once daily., Disp: 30 tablet, Rfl: 0    meloxicam (MOBIC) 15 MG tablet, Take 1 tablet (15 mg total) by mouth once daily., Disp: 30 tablet, Rfl: 2    mupirocin (BACTROBAN) 2 % ointment, Apply topically 3 (three) times daily., Disp: 15 g, Rfl: 0    polyethylene glycol (GLYCOLAX) 17 gram PwPk, Take 17 g by mouth once daily. (Patient not taking: Reported on 12/16/2021), Disp: 30 each, Rfl: 11     Additional Information:  Self-assessed functional score? [] No     [x] Yes:LEFS 10/80   Pain? [] No     [x] Yes:  8/10 at time of evaluation   Warmth to touch? [x] No     [] Yes:    TTP? [] No     [x] Yes: (B) hips over trochanter   Surgical incision? [x] No     [] Yes:             Muscle Atrophy? [x] No     [] Yes:    Able to perform SLR (I)? [] No     [x] Yes:   Comments: Palpation: tenderness to palpation over (B) hips.      Range of Motion: (all numbers are in degrees) Pain throughout ROM (B) hips.   HIP      Right Left   Extension 10 10   Flexion 100 100   Abduction 45 45   Int Rotation 20 20   Ext Rotation 20 20   KNEE:   [x] WFL [] Deficits:  ANKLE: [x] WFL [] Deficits:    Strength:  HIP      Right Left   Extension 3+/5 3+/5   Flexion 3+/5 3+/5   Abduction 3+/5 3+/5   Adduction 3+/5 3+/5   Int Rotation 3+/5 3+/5   Ext Rotation 3+/5 3+/5   KNEE:   [] WFL [x] Deficits:4/5  ANKLE: [] WFL [x] Deficits:4/5    Muscle Length:   Right Left   Ely [x] Negative  [] Positive  [x] Negative  [] Positive   Rell [x] Negative  [] Positive [x] Negative  [] Positive   Humza [] Negative  [x] Positive [] Negative  [x] Positive   90-90  hamstrings -30 -40     Special Tests:   + -   Scour [x]  []    Quadrant []  [x]    PONCE []  [x]    Flexion Adduction [x]    []        Gait Analysis:  Assistive Device: [] Walker         [] Cane         [] Axillary Crutches         [] Other:   none  Weight Bearing Status: [x] FWB/WBAT         [] PWB (%)         [] TTWB        [] NWB    [x] Antalgic    [x]  Decreased tanmay   [] Decreased velocity    [x] Decreased step length [] Decreased foot clearance [] Decreased swing  [x] Decreased stance    [] Trendelenberg  [] Circumducting  [x] Decreased heel strike        [] Other:  Comments:      ____  LOWER EXTREMITY FUNCTIONAL SCALE  10/80         1. Any of your usual work, housework or school activities   0/4  2. Your usual hobbies, sporting     0/4  3. Getting in and out of tub      1/4  4. Walking between rooms      0/4  5. Putting on shoes or socks      0/4  6. Squatting        2/4  7. Lifting an object from the ground      0/4  8. Performing light activities around the home   1/4  9. Performing heavy activities around the home   4/4  10. Getting in and out of car      2/4  11. Walking 2 blocks       0/4  12.Walking a mile       0/4  13. Getting up and down 1 flight of stairs    0/4  14. Standing for 1 hour      0/4  15. Sitting for an hour       0/4  16. Running on even ground      0/4  17. Running on uneven ground     0/4  18. Making sharp turns when running fast    0/4  19. Hopping        0/4  20. Rolling over in bed      0/4    __________________________________________________________________    Assessment:  Patient with difficulty with transitional movement in clinic. Patient with antalgic gait pattern. Patient with difficulty with mobility on mat changing positions.      Problem List:  [x] Pain      [x] Postural Dysfunction  [x] Muscular Weakness   [] Impaired Balance  [x] ROM Limitations    [] Other:    [x] Functional Mobility Limitations  [] Other:  [] Edema     [] Other:       Prognosis:  [] Excellent                          [x] Good                          [] Fair                          [] Poor     Treatment Plan:    [x] Therapeutic Exercise [x] Therapeutic Activity  [x] Gait Training    [x] Manual Therapy  [x] Electrical Stimulation [x] Ultrasound      [x] Thermotherapy  [x] Cryotherapy  [] Cold Laser    [] Mechanical Traction [] Iontophoresis  [] Neuro  Re-Education    [x] Postural Training  [x]  Education [x] Home Exercise Program    [] Other:       *some interventions may be provided by physical therapist assistant     Patient received CP to (B) hips x10 during evaluation to facilitate relief of pain symptoms with minimal pain relief.     Frequency: 2x/week X 6 weeks  Certification dates: 1/5/2022-2/18/2022     Goals:  Short Term Goals (3 weeks)  1. Patient will report < 2/10 pain at rest and < 4/10 pain with activity.  2. Patient will improve (B) hip AROM to WNL flexion , Abduction,  extension   3. Patient will be (I) and compliant with HEP and its progression.  4. Patient will perform SLR (I) with no extensor lag.  5. Patient will perform sit>stand transfer (I) with equal LE weightbearing.  6. Patient will be able to walk 2 blocks.   Long Term Goals (6 weeks)  1. Patient will ambulate mod(I) with minimal gait deviations at community level.  2. Patient will improve modified LEFS/LEFS score to > 50/80  3. Patient will ambulate (I) at community level with no gait deviations.  4. Patient will improve (B) hip strength to > 4-/5 grossly.  5. Patient will demonstrate negative results special tests performed.   6. Patient will improve sitting tolerance to 2 hours.  7. Patient will improve standing tolerance to 30 minutes.      Physical Therapist: Esther Singh, PT  ________________________________________________________________________    MD Certification    [] I certify that I have reviewed this PT Evaluation   and I am in agreement with the Plan of Care.    MD:  Date:

## 2022-01-05 ENCOUNTER — CLINICAL SUPPORT (OUTPATIENT)
Dept: REHABILITATION | Facility: HOSPITAL | Age: 47
End: 2022-01-05
Payer: MEDICAID

## 2022-01-05 DIAGNOSIS — M70.61 TROCHANTERIC BURSITIS OF BOTH HIPS: ICD-10-CM

## 2022-01-05 DIAGNOSIS — M25.551 HIP PAIN, BILATERAL: Primary | ICD-10-CM

## 2022-01-05 DIAGNOSIS — M70.62 TROCHANTERIC BURSITIS OF BOTH HIPS: ICD-10-CM

## 2022-01-05 DIAGNOSIS — M25.552 HIP PAIN, BILATERAL: Primary | ICD-10-CM

## 2022-01-05 PROCEDURE — 97110 THERAPEUTIC EXERCISES: CPT

## 2022-01-05 NOTE — PROGRESS NOTES
Physical Therapy Daily Note     Name: Mena Ruiz [facility] Number: [id number]  Diagnosis:   Encounter Diagnoses   Name Primary?    Hip pain, bilateral Yes    Trochanteric bursitis of both hips      Physician: Kobe Ivey NP  Precautions: fall  Visit #: 1 of 12  PTA Visit #: 0  Time In: 1107  Time Out: 1200    Subjective     Pt reports: Slight decrease in (R) hip pain, Continued pain same level (L) hip pain. Patient reports pain symptoms were reduced to 6/10 following evaluation.   Pain Scale: Mena rates pain on a scale of 0-10 to be 8 currently. Increase in pain symptoms to 10/10 following treatment. Patient walked out of therapy gym with antalgic gait but in no apparent distress.     Objective     Mena received individual therapeutic exercises to develop strength, endurance, ROM, flexibility, posture and core stabilization for 38 minutes including:  2x10 LTR, Glut sets, SAQ, supine marching alternating (R) and (L), clam shells, adductor squeezes, bridges,     The patient received the following supervised modalities after being cleared for contradictions: CP to (B) hips x15 minutes.     Written Home Exercises Provided: will be provided future appt  Education provided re: exercise technique  Mena verbalized good understanding of education provided.   No spiritual or educational barriers to learning provided    Assessment     Patient tolerated treatment fair. Patient with discomfort during exercises. Will adapt exercise program as appropriate.   This is a 46 y.o. female referred to outpatient physical therapy and presents with a medical diagnosis of trochanteric bursitis (B) hips and demonstrates limitations as described in the problem list. Pt prognosis is Good. Pt will continue to benefit from skilled outpatient physical therapy to address the deficits listed in the problem list, provide pt/family education and to maximize pt's level of  independence in the home and community environment.     Goals as follows:  Goals:  Short Term Goals (3 weeks)  1. Patient will report < 2/10 pain at rest and < 4/10 pain with activity.  2. Patient will improve (B) hip AROM to WNL flexion , Abduction,  extension   3. Patient will be (I) and compliant with HEP and its progression.  4. Patient will perform SLR (I) with no extensor lag.  5. Patient will perform sit>stand transfer (I) with equal LE weightbearing.  6. Patient will be able to walk 2 blocks.   Long Term Goals (6 weeks)  1. Patient will ambulate mod(I) with minimal gait deviations at community level.  2. Patient will improve modified LEFS/LEFS score to > 50/80  3. Patient will ambulate (I) at community level with no gait deviations.  4. Patient will improve (B) hip strength to > 4-/5 grossly.  5. Patient will demonstrate negative results special tests performed.   6. Patient will improve sitting tolerance to 2 hours.  7. Patient will improve standing tolerance to 30 minutes.           Plan     Continue with established Plan of Care towards PT goals.    Therapist: Esther Singh, PT  1/5/2022

## 2022-01-11 ENCOUNTER — CLINICAL SUPPORT (OUTPATIENT)
Dept: REHABILITATION | Facility: HOSPITAL | Age: 47
End: 2022-01-11
Attending: NURSE PRACTITIONER
Payer: MEDICAID

## 2022-01-11 DIAGNOSIS — M25.551 HIP PAIN, BILATERAL: Primary | ICD-10-CM

## 2022-01-11 DIAGNOSIS — M25.552 HIP PAIN, BILATERAL: Primary | ICD-10-CM

## 2022-01-11 PROCEDURE — 97110 THERAPEUTIC EXERCISES: CPT

## 2022-01-11 NOTE — PROGRESS NOTES
"                                                    Physical Therapy Daily Note     Name: Mena Ruiz [facility] Number: [id number]  Diagnosis:   Encounter Diagnosis   Name Primary?    Hip pain, bilateral Yes     Physician: Kobe Ivey NP  Precautions: fall  Visit #: 2 of 12  PTA Visit #: 0  Time In: 0800  Time Out: 0856    Subjective     Pt reports: Decrease in (B) hip pain reported today. Patient reports performing exercise program but no changes in symptoms when performing exercises.   Pain Scale: Mena rates pain on a scale of 0-10 to be 5 currently. 9/10 after treatment.     Objective     Mena received individual therapeutic exercises to develop strength, endurance, ROM, flexibility, posture and core stabilization for 46 minutes including:  2x10 LTR, Glut sets, SAQ, supine marching alternating (R) and (L), clam shells, adductor squeezes, bridges, HS stretch 3x15"    The patient received the following supervised modalities after being cleared for contradictions: CP to (B) hips x10 minutes.     Education provided re: exercise technique  Mena verbalized good understanding of education provided.   No spiritual or educational barriers to learning provided    Assessment     Patient tolerated treatment fair. Patient with increased (B) hip pain following treatment. PT will continue to monitor treatment and patient symptoms for longterm progress with therapy. Will modify patient exercises as appropriate.   This is a 46 y.o. female referred to outpatient physical therapy and presents with a medical diagnosis of trochanteric bursitis (B) hips and demonstrates limitations as described in the problem list. Pt prognosis is Good. Pt will continue to benefit from skilled outpatient physical therapy to address the deficits listed in the problem list, provide pt/family education and to maximize pt's level of independence in the home and community environment.     Goals as follows:  Goals:  Short Term Goals (3 " weeks)  1. Patient will report < 2/10 pain at rest and < 4/10 pain with activity.  2. Patient will improve (B) hip AROM to WNL flexion , Abduction,  extension   3. Patient will be (I) and compliant with HEP and its progression.  4. Patient will perform SLR (I) with no extensor lag.  5. Patient will perform sit>stand transfer (I) with equal LE weightbearing.  6. Patient will be able to walk 2 blocks.   Long Term Goals (6 weeks)  1. Patient will ambulate mod(I) with minimal gait deviations at community level.  2. Patient will improve modified LEFS/LEFS score to > 50/80  3. Patient will ambulate (I) at community level with no gait deviations.  4. Patient will improve (B) hip strength to > 4-/5 grossly.  5. Patient will demonstrate negative results special tests performed.   6. Patient will improve sitting tolerance to 2 hours.  7. Patient will improve standing tolerance to 30 minutes.           Plan     Continue with established Plan of Care towards PT goals.    Therapist: Esther Singh, PT  1/11/2022

## 2022-01-19 ENCOUNTER — DOCUMENTATION ONLY (OUTPATIENT)
Dept: REHABILITATION | Facility: HOSPITAL | Age: 47
End: 2022-01-19
Payer: MEDICAID

## 2022-01-19 NOTE — PROGRESS NOTES
Physical Therapy      Patient Name:  Mena Mcdaniel   MRN:  [id number]    Patient not seen at this time due to N/S. Will follow-up tomorrow at next scheduled appt.    Emmy Valdivia, PTA  1/19/2022

## 2022-01-20 ENCOUNTER — CLINICAL SUPPORT (OUTPATIENT)
Dept: REHABILITATION | Facility: HOSPITAL | Age: 47
End: 2022-01-20
Payer: MEDICAID

## 2022-01-20 DIAGNOSIS — M70.62 TROCHANTERIC BURSITIS OF BOTH HIPS: Primary | ICD-10-CM

## 2022-01-20 DIAGNOSIS — M70.61 TROCHANTERIC BURSITIS OF BOTH HIPS: Primary | ICD-10-CM

## 2022-01-20 PROCEDURE — 97110 THERAPEUTIC EXERCISES: CPT | Mod: CQ

## 2022-01-20 NOTE — PROGRESS NOTES
"                                                    Physical Therapy Daily Note     Name: Mena Ruiz [facility] Number: [id number]  Diagnosis:   Hip pain, bilateral    Trochanteric bursitis of both hips    Physician: Kobe Ivey NP  Precautions: fall  Visit #: 3 of 12  PTA Visit #: 1  Time In: 0950  Time Out: 1050    Subjective     Pt reports: Denied pain today.  Pt reports current ex's are "so far, so good".   Pain Scale: Mena rates pain on a scale of 0-10 to be 0 currently. 8/10 after treatment.     Objective     Mena received individual therapeutic exercises to develop strength, endurance, ROM, flexibility, posture and core stabilization for 60 minutes including:  2x10 LTR, Glut sets, SAQ w/ TA brace 2x10, supine marching alternating (R) and (L), clam shells 2x10, adductor squeezes 20x5", bridges 2x10, piriformis stretch 3x15" EILEEN, & HS stretch 3x15" EILEEN.    The patient received the following supervised modalities after being cleared for contradictions: CP to (B) hips x10 minutes in supine position w/ sheet used to secure packs in place.     Education provided re: exercise technique  Mena verbalized good understanding of education provided.   No spiritual or educational barriers to learning provided    Assessment     Pt cont'es to report increase pain post session. However, able to complete ex's w/ good technique.     This is a 46 y.o. female referred to outpatient physical therapy and presents with a medical diagnosis of trochanteric bursitis (B) hips and demonstrates limitations as described in the problem list. Pt will continue to benefit from skilled outpatient physical therapy to address the deficits listed in the problem list, provide pt/family education and maximize pt's level of independence in the home and community environment.     Goals as follows:  Goals:  Short Term Goals (3 weeks)  1. Patient will report < 2/10 pain at rest and < 4/10 pain with activity.  2. Patient will improve " (B) hip AROM to WNL flexion , Abduction,  extension   3. Patient will be (I) and compliant with HEP and its progression.  4. Patient will perform SLR (I) with no extensor lag.  5. Patient will perform sit>stand transfer (I) with equal LE weightbearing.  6. Patient will be able to walk 2 blocks.   Long Term Goals (6 weeks)  1. Patient will ambulate mod(I) with minimal gait deviations at community level.  2. Patient will improve modified LEFS/LEFS score to > 50/80  3. Patient will ambulate (I) at community level with no gait deviations.  4. Patient will improve (B) hip strength to > 4-/5 grossly.  5. Patient will demonstrate negative results special tests performed.   6. Patient will improve sitting tolerance to 2 hours.  7. Patient will improve standing tolerance to 30 minutes.           Plan     Continue with established Plan of Care towards PT goals. RTMD 2/8/22.     Therapist: Emmy Valdivia, PTA  1/20/2022

## 2022-01-25 ENCOUNTER — OFFICE VISIT (OUTPATIENT)
Dept: OBSTETRICS AND GYNECOLOGY | Facility: CLINIC | Age: 47
End: 2022-01-25
Payer: MEDICAID

## 2022-01-25 VITALS
HEART RATE: 91 BPM | DIASTOLIC BLOOD PRESSURE: 100 MMHG | BODY MASS INDEX: 36.78 KG/M2 | WEIGHT: 221 LBS | SYSTOLIC BLOOD PRESSURE: 147 MMHG

## 2022-01-25 DIAGNOSIS — F41.1 GENERALIZED ANXIETY DISORDER: Primary | ICD-10-CM

## 2022-01-25 DIAGNOSIS — Z79.890 SURGICAL MENOPAUSE ON HORMONE REPLACEMENT THERAPY: ICD-10-CM

## 2022-01-25 DIAGNOSIS — F33.41 RECURRENT MAJOR DEPRESSIVE DISORDER, IN PARTIAL REMISSION: ICD-10-CM

## 2022-01-25 DIAGNOSIS — Z72.0 TOBACCO ABUSE: ICD-10-CM

## 2022-01-25 DIAGNOSIS — E89.40 SURGICAL MENOPAUSE ON HORMONE REPLACEMENT THERAPY: ICD-10-CM

## 2022-01-25 DIAGNOSIS — Z78.0 MENOPAUSE: ICD-10-CM

## 2022-01-25 PROCEDURE — 3077F SYST BP >= 140 MM HG: CPT | Mod: CPTII,,, | Performed by: OBSTETRICS & GYNECOLOGY

## 2022-01-25 PROCEDURE — 1159F MED LIST DOCD IN RCRD: CPT | Mod: CPTII,,, | Performed by: OBSTETRICS & GYNECOLOGY

## 2022-01-25 PROCEDURE — 3008F PR BODY MASS INDEX (BMI) DOCUMENTED: ICD-10-PCS | Mod: CPTII,,, | Performed by: OBSTETRICS & GYNECOLOGY

## 2022-01-25 PROCEDURE — 3080F PR MOST RECENT DIASTOLIC BLOOD PRESSURE >= 90 MM HG: ICD-10-PCS | Mod: CPTII,,, | Performed by: OBSTETRICS & GYNECOLOGY

## 2022-01-25 PROCEDURE — 99999 PR PBB SHADOW E&M-EST. PATIENT-LVL III: CPT | Mod: PBBFAC,,, | Performed by: OBSTETRICS & GYNECOLOGY

## 2022-01-25 PROCEDURE — 99213 OFFICE O/P EST LOW 20 MIN: CPT | Mod: S$PBB,,, | Performed by: OBSTETRICS & GYNECOLOGY

## 2022-01-25 PROCEDURE — 1159F PR MEDICATION LIST DOCUMENTED IN MEDICAL RECORD: ICD-10-PCS | Mod: CPTII,,, | Performed by: OBSTETRICS & GYNECOLOGY

## 2022-01-25 PROCEDURE — 3080F DIAST BP >= 90 MM HG: CPT | Mod: CPTII,,, | Performed by: OBSTETRICS & GYNECOLOGY

## 2022-01-25 PROCEDURE — 3077F PR MOST RECENT SYSTOLIC BLOOD PRESSURE >= 140 MM HG: ICD-10-PCS | Mod: CPTII,,, | Performed by: OBSTETRICS & GYNECOLOGY

## 2022-01-25 PROCEDURE — 99999 PR PBB SHADOW E&M-EST. PATIENT-LVL III: ICD-10-PCS | Mod: PBBFAC,,, | Performed by: OBSTETRICS & GYNECOLOGY

## 2022-01-25 PROCEDURE — 99213 PR OFFICE/OUTPT VISIT, EST, LEVL III, 20-29 MIN: ICD-10-PCS | Mod: S$PBB,,, | Performed by: OBSTETRICS & GYNECOLOGY

## 2022-01-25 PROCEDURE — 3008F BODY MASS INDEX DOCD: CPT | Mod: CPTII,,, | Performed by: OBSTETRICS & GYNECOLOGY

## 2022-01-25 PROCEDURE — 99213 OFFICE O/P EST LOW 20 MIN: CPT | Mod: PBBFAC | Performed by: OBSTETRICS & GYNECOLOGY

## 2022-01-25 RX ORDER — ESTERIFIED ESTROGEN AND METHYLTESTOSTERONE .625; 1.25 MG/1; MG/1
1 TABLET ORAL DAILY
Qty: 30 TABLET | Refills: 2 | Status: SHIPPED | OUTPATIENT
Start: 2022-01-25 | End: 2022-03-30

## 2022-01-25 NOTE — PROGRESS NOTES
Subjective:    Patient ID: Mena Mcdaniel is a 46 y.o. y.o. female.     Chief Complaint:   Chief Complaint   Patient presents with    Follow-up     Medication follow up. Patient complains of hot flashes.        History of Present Illness:  Mena presents today for follow up of HRT. States having hot flashes again for the past 3 weeks.      Menstrual History:   Patient's last menstrual period was 2012..     OB History        5    Para   2    Term   2            AB   3    Living   2       SAB        IAB        Ectopic        Multiple        Live Births                       Review of Systems   Constitutional: Negative for chills, fatigue and fever.   Respiratory: Negative for shortness of breath.    Cardiovascular: Negative for chest pain.   Gastrointestinal: Negative for abdominal pain, constipation, diarrhea and nausea.   Genitourinary: Positive for hot flashes. Negative for bladder incontinence, dysuria, pelvic pain and vaginal bleeding.   Neurological: Negative for headaches.   Psychiatric/Behavioral: Negative for depression.         Objective:    Vital Signs:  Vitals:    22 1546   BP: (!) 147/100   Pulse: 91     Wt Readings from Last 1 Encounters:   22 100.2 kg (221 lb)     Body mass index is 36.78 kg/m².    Physical Exam:  General:  alert, no distress, moderately obese   Skin:  Skin color, texture, turgor normal. No rashes or lesions   Abdomen:  soft nontender   Extremities: Normal ROM; no edema, no cyanosis   Psychiatric: normal mood, speech, dress, and thought processes     Discussed use of estratest to help with symptoms and pt is agreeable to try it. Use and side effects discussed and all questions answered    I spent a total of 20 minutes on the day of the visit.  This includes face to face time and non-face to face time preparing to see the patient (eg, review of tests), obtaining and/or reviewing separately obtained history, documenting clinical information in the  electronic or other health record, independently interpreting results and communicating results to the patient/family/caregiver, or care coordinator.      Assessment:      1. Generalized anxiety disorder    2. Tobacco abuse    3. Surgical menopause on hormone replacement therapy    4. Recurrent major depressive disorder, in partial remission    5. Menopause          Plan:      Generalized anxiety disorder    Tobacco abuse    Surgical menopause on hormone replacement therapy  -     estrogens,esterified,-methyltestosterone 0.625-1.25mg (ESTRATEST HS) per tablet; Take 1 tablet by mouth once daily.  Dispense: 30 tablet; Refill: 2    Recurrent major depressive disorder, in partial remission    Menopause  -     estrogens,esterified,-methyltestosterone 0.625-1.25mg (ESTRATEST HS) per tablet; Take 1 tablet by mouth once daily.  Dispense: 30 tablet; Refill: 2           Rosita Tavares MD, FACOG   01/25/2022 3:54 PM

## 2022-01-27 ENCOUNTER — TELEPHONE (OUTPATIENT)
Dept: OBSTETRICS AND GYNECOLOGY | Facility: CLINIC | Age: 47
End: 2022-01-27
Payer: MEDICAID

## 2022-01-27 ENCOUNTER — CLINICAL SUPPORT (OUTPATIENT)
Dept: REHABILITATION | Facility: HOSPITAL | Age: 47
End: 2022-01-27
Attending: NURSE PRACTITIONER
Payer: MEDICAID

## 2022-01-27 DIAGNOSIS — M70.61 TROCHANTERIC BURSITIS OF BOTH HIPS: Primary | ICD-10-CM

## 2022-01-27 DIAGNOSIS — M70.62 TROCHANTERIC BURSITIS OF BOTH HIPS: Primary | ICD-10-CM

## 2022-01-27 PROCEDURE — 97110 THERAPEUTIC EXERCISES: CPT | Mod: CQ

## 2022-01-27 NOTE — TELEPHONE ENCOUNTER
Pt called, insurance not wanting to cover estrogens,esterified,-methyltestosterone 0.625-1.25mg (ESTRATEST HS) per tablet  Is wondering if there is an alternative we can send?

## 2022-01-27 NOTE — PROGRESS NOTES
"                                                    Physical Therapy Daily Note     Name: Mena Ruiz [facility] Number: [id number]  Diagnosis:   Hip pain, bilateral    Trochanteric bursitis of both hips    Physician: Kobe Ivey, NP  Precautions: fall  Visit #: 4 of 12  PTA Visit #: 2  Time In: 1004  Time Out: 1045    Subjective     Pt reports: Denied pain at this time. Pt states she has never been given a HEP, but has been performing ex's that she "can remember".   Pain Scale: Mena rates pain on a scale of 0-10 to be 0 currently.     Objective     Mena received individual therapeutic exercises to develop strength, endurance, ROM, flexibility, posture and core stabilization for 31 minutes including:  2x10 LTR, Glut sets 20x5", SAQ w/ TA brace 2x10, supine marching alternating (R) and (L), clam shells 2x10, adductor squeezes 20x5", bridges 2x10, piriformis stretch 3x15" EILEEN, & HS stretch 3x15" EILEEN. Educated pt on hip abd GTB as new progression to program. Performed 2x10.     The patient received the following supervised modalities after being cleared for contradictions: HP to (B) hips x10 minutes in supine position w/ sheet used to secure packs in place.     Developed HEP today to be handed out and reviewed at next scheduled session for continuation of home ex's.     Education provided re: exercise technique  Mena verbalized good understanding of education provided.   No spiritual or educational barriers to learning provided    Assessment     Denied pain post session today.     This is a 46 y.o. female referred to outpatient physical therapy and presents with a medical diagnosis of trochanteric bursitis (B) hips and demonstrates limitations as described in the problem list. Pt will continue to benefit from skilled outpatient physical therapy to address the deficits listed in the problem list, provide pt/family education and maximize pt's level of independence in the home and community environment. "     Goals as follows:  Goals:  Short Term Goals (3 weeks)  1. Patient will report < 2/10 pain at rest and < 4/10 pain with activity.  2. Patient will improve (B) hip AROM to WNL flexion , Abduction,  extension   3. Patient will be (I) and compliant with HEP and its progression.  4. Patient will perform SLR (I) with no extensor lag.  5. Patient will perform sit>stand transfer (I) with equal LE weightbearing.  6. Patient will be able to walk 2 blocks.   Long Term Goals (6 weeks)  1. Patient will ambulate mod(I) with minimal gait deviations at community level.  2. Patient will improve modified LEFS/LEFS score to > 50/80  3. Patient will ambulate (I) at community level with no gait deviations.  4. Patient will improve (B) hip strength to > 4-/5 grossly.  5. Patient will demonstrate negative results special tests performed.   6. Patient will improve sitting tolerance to 2 hours.  7. Patient will improve standing tolerance to 30 minutes.           Plan     Distribute and review HEP. See . Continue with established Plan of Care towards PT goals. RTMD 2/8/22.     Therapist: Emmy Valdivia, PTA  1/27/2022

## 2022-02-01 ENCOUNTER — CLINICAL SUPPORT (OUTPATIENT)
Dept: REHABILITATION | Facility: HOSPITAL | Age: 47
End: 2022-02-01
Payer: MEDICAID

## 2022-02-01 DIAGNOSIS — M70.61 TROCHANTERIC BURSITIS OF BOTH HIPS: ICD-10-CM

## 2022-02-01 DIAGNOSIS — M25.552 HIP PAIN, BILATERAL: Primary | ICD-10-CM

## 2022-02-01 DIAGNOSIS — M25.551 HIP PAIN, BILATERAL: Primary | ICD-10-CM

## 2022-02-01 DIAGNOSIS — M70.62 TROCHANTERIC BURSITIS OF BOTH HIPS: ICD-10-CM

## 2022-02-01 PROCEDURE — 97110 THERAPEUTIC EXERCISES: CPT

## 2022-02-01 NOTE — PROGRESS NOTES
"                                                    Physical Therapy Daily Note     Name: Mena Ruiz [facility] Number: [id number]  Diagnosis:   Hip pain, bilateral    Trochanteric bursitis of both hips    Physician: Kobe Ivey NP  Precautions: fall  Visit #: 5 of 12  PTA Visit #: 0  Time In: 1300  Time Out: 1355    Subjective     Pt reports: Patient reports pain (B) hips today. Patient performing exercises she can remember. Patient reports she may be "overdoing it" with household chores. Patient return to referring provider 2/8/2022.   Pain Scale: Mena rates pain on a scale of 0-10 to be 7 currently.     Objective     Mena received individual therapeutic exercises to develop strength, endurance, ROM, flexibility, posture and core stabilization for 45 minutes including:  2x10 LTR, Glut sets 20x5", SAQ w/ TA brace 2x10, supine marching alternating (R) and (L), clam shells 2x10, adductor squeezes 20x5", bridges 2x10, piriformis stretch 3x15" EILEEN, & HS stretch 3x15" EILEEN. Seated hip abduction with GTB.     The patient received the following supervised modalities after being cleared for contradictions: HP to (B) hips x10 minutes in supine position      ROM (B) hip ROM WFL  Strength:  HIP         Right Left   Extension 4-/5 4-/5   Flexion 4-/5 4-/5   Abduction 4-/5 4-/5   Adduction 4-/5 4-/5   Int Rotation 4-/5 4-/5   Ext Rotation 4-/5 4-/5       LOWER EXTREMITY FUNCTIONAL SCALE  14/80         1. Any of your usual work, housework or school activities   2/4  2. Your usual hobbies, sporting     0/4  3. Getting in and out of tub      3/4  4. Walking between rooms      0/4  5. Putting on shoes or socks      2/4  6. Squatting        0/4  7. Lifting an object from the ground      0/4  8. Performing light activities around the home   0/4  9. Performing heavy activities around the home   0/4  10. Getting in and out of car      4/4  11. Walking 2 blocks       0/4  12.Walking a mile       0/4  13. Getting up and " down 1 flight of stairs    0/4  14. Standing for 1 hour      0/4  15. Sitting for an hour       0/4  16. Running on even ground      0/4  17. Running on uneven ground     1/4  18. Making sharp turns when running fast    2/4  19. Hopping        0/4  20. Rolling over in bed      0/4      Education provided re: exercise technique  Mena verbalized good understanding of education provided.   No spiritual or educational barriers to learning provided    Assessment     Patient with continued (B) hip pain symptoms especially when performing daily functional activities. Patient (B) hip ROM WNL but continues to report discomfort throughout ROM (B). Patient encouraged to continue performing exercises. Patient with 4 point improvement in LEFS score since beginning therapy treatment. Patient with improvement in hip strength. Patient prefers MHP to CP for (B) hips following treatment.     This is a 46 y.o. female referred to outpatient physical therapy and presents with a medical diagnosis of trochanteric bursitis (B) hips and demonstrates limitations as described in the problem list. Pt will continue to benefit from skilled outpatient physical therapy to address the deficits listed in the problem list, provide pt/family education and maximize pt's level of independence in the home and community environment.     Goals as follows:  Goals:  Short Term Goals (3 weeks)  1. Patient will report < 2/10 pain at rest and < 4/10 pain with activity. Progress 2/1/2022 CBB    2. Patient will improve (B) hip AROM to WNL flexion , Abduction,  extension MET 2/1/2022 CBB  3. Patient will be (I) and compliant with HEP and its progression.Progress 2/1/2022 CBB    4. Patient will perform SLR (I) with no extensor lag.MET 2/1/2022 CBB    5. Patient will perform sit>stand transfer (I) with equal LE weightbearing.MET 2/1/2022 CBB    6. Patient will be able to walk 2 blocks. Progress 2/1/2022 CBB    Long Term Goals (6 weeks)  1. Patient will ambulate  mod(I) with minimal gait deviations at community level.Progress 2/1/2022 CBB    2. Patient will improve modified LEFS/LEFS score to > 50/80Progress 2/1/2022 CBB    4. Patient will improve (B) hip strength to > 4-/5 grossly.Progress 2/1/2022 CBB    5. Patient will demonstrate negative results special tests performed. Progress 2/1/2022 CBB    6. Patient will improve sitting tolerance to 2 hours.MET 2/1/2022 CBB    7. Patient will improve standing tolerance to 30 minutes. MET 2/1/2022 CBB            Plan      Continue with established Plan of Care towards PT goals.     Therapist: Esther Singh, PT  2/1/2022

## 2022-02-03 ENCOUNTER — CLINICAL SUPPORT (OUTPATIENT)
Dept: REHABILITATION | Facility: HOSPITAL | Age: 47
End: 2022-02-03
Attending: NURSE PRACTITIONER
Payer: MEDICAID

## 2022-02-03 DIAGNOSIS — M70.62 TROCHANTERIC BURSITIS OF BOTH HIPS: ICD-10-CM

## 2022-02-03 DIAGNOSIS — M25.552 HIP PAIN, BILATERAL: Primary | ICD-10-CM

## 2022-02-03 DIAGNOSIS — M25.551 HIP PAIN, BILATERAL: Primary | ICD-10-CM

## 2022-02-03 DIAGNOSIS — M70.61 TROCHANTERIC BURSITIS OF BOTH HIPS: ICD-10-CM

## 2022-02-03 PROCEDURE — 97110 THERAPEUTIC EXERCISES: CPT

## 2022-02-03 NOTE — PROGRESS NOTES
"                                                    Physical Therapy Daily Note     Name: Mena Ruiz [facility] Number: [id number]  Diagnosis:   Hip pain, bilateral    Trochanteric bursitis of both hips    Physician: Kobe Ivey NP  Precautions: fall  Visit #: 6 of 12  PTA Visit #: 0  Time In: 0820  Time Out: 0859    Subjective     Pt reports: Patient reports continued pain (B) hips today. Patient reports she has been having (B) hip pain 10/10 since last treatment. Patient states " I don't stop" when asked about activity level.   Pain Scale: Mena rates pain on a scale of 0-10 to be 10 currently. Patient ambulated into clinic with moderate antalgic gait, able to hold conversation with therapist. Patient does not appear to be in distress.     Objective     Mena received individual therapeutic exercises to develop strength, endurance, ROM, flexibility, posture and core stabilization for 39 minutes including: MHP applied to (B) hips prior to exercises supine x 15 minutes to impact pain relief and warming of muscles prior to stretching/strengthening.   2x10 LTR, Glut sets 20x5", supine marching alternating (R) and (L), clam shells 2x10, adductor squeezes 20x5", bridges 2x10, piriformis stretch 3x15" EILEEN, & HS stretch 3x15" EILEEN. Seated hip abduction with GTB.     Education provided re: educated to be cautious with activity level and activities that could be causing irritation to (B) hip joints.   Mena verbalized good understanding of education provided.   No spiritual or educational barriers to learning provided    Assessment     Patient with continued (B) hip pain symptoms especially when performing daily functional activities.   Patient reports decreased complaints of hip pain following MHP to (B) hips. Patient reports decreased pain to 8/10 following treatment.     This is a 46 y.o. female referred to outpatient physical therapy and presents with a medical diagnosis of trochanteric bursitis (B) hips " and demonstrates limitations as described in the problem list. Pt will continue to benefit from skilled outpatient physical therapy to address the deficits listed in the problem list, provide pt/family education and maximize pt's level of independence in the home and community environment.     Goals as follows:  Goals:  Short Term Goals (3 weeks)  1. Patient will report < 2/10 pain at rest and < 4/10 pain with activity. Progress 2/1/2022 CBB    2. Patient will improve (B) hip AROM to WNL flexion , Abduction,  extension MET 2/1/2022 CBB  3. Patient will be (I) and compliant with HEP and its progression.Progress 2/1/2022 CBB    4. Patient will perform SLR (I) with no extensor lag.MET 2/1/2022 CBB    5. Patient will perform sit>stand transfer (I) with equal LE weightbearing.MET 2/1/2022 CBB    6. Patient will be able to walk 2 blocks. Progress 2/1/2022 CBB    Long Term Goals (6 weeks)  1. Patient will ambulate mod(I) with minimal gait deviations at community level.Progress 2/1/2022 CBB    2. Patient will improve modified LEFS/LEFS score to > 50/80Progress 2/1/2022 CBB    4. Patient will improve (B) hip strength to > 4-/5 grossly.Progress 2/1/2022 CBB    5. Patient will demonstrate negative results special tests performed. Progress 2/1/2022 CBB    6. Patient will improve sitting tolerance to 2 hours.MET 2/1/2022 CBB    7. Patient will improve standing tolerance to 30 minutes. MET 2/1/2022 CBB            Plan      Continue with established Plan of Care towards PT goals.     Therapist: Esther Singh, PT  2/3/2022

## 2022-02-10 ENCOUNTER — CLINICAL SUPPORT (OUTPATIENT)
Dept: REHABILITATION | Facility: HOSPITAL | Age: 47
End: 2022-02-10
Payer: MEDICAID

## 2022-02-10 DIAGNOSIS — M25.551 HIP PAIN, BILATERAL: Primary | ICD-10-CM

## 2022-02-10 DIAGNOSIS — M25.552 HIP PAIN, BILATERAL: Primary | ICD-10-CM

## 2022-02-10 DIAGNOSIS — M70.62 TROCHANTERIC BURSITIS OF BOTH HIPS: ICD-10-CM

## 2022-02-10 DIAGNOSIS — M70.61 TROCHANTERIC BURSITIS OF BOTH HIPS: ICD-10-CM

## 2022-02-10 PROCEDURE — 97110 THERAPEUTIC EXERCISES: CPT

## 2022-02-10 NOTE — PROGRESS NOTES
"                                                    Physical Therapy Daily Note     Name: Mena Ruiz [facility] Number: [id number]  Diagnosis:   Hip pain, bilateral    Trochanteric bursitis of both hips    Physician: Kobe Ivey NP  Precautions: fall  Visit #: 7 of 12  PTA Visit #: 0  Time In: 1104  Time Out: 1145    Subjective     Pt reports: Patient reports continued pain (B) hips today. Patient reports she hasn't been performing much activity. Patient reports walking about 10 blocks to get to therapy today and has intense pain (B) hips.   Pain Scale: Mena rates pain on a scale of 0-10 to be 10 currently. Patient ambulated into clinic with moderate antalgic gait, able to hold conversation with therapist. Patient does not appear to be in distress. Pain reduced to 8/10 following treatment.     Objective     Mena received individual therapeutic exercises to develop strength, endurance, ROM, flexibility, posture and core stabilization for 41 minutes including: MHP applied to (B) hips prior to exercises supine x 15 minutes to impact pain relief and warming of muscles prior to stretching/strengthening.   2x10 LTR, Glut sets 20x5", supine marching alternating (R) and (L), clam shells 2x10, adductor squeezes 20x5", bridges 2x10, piriformis stretch 3x15" EILEEN, & HS stretch 3x15" EILEEN. Seated hip abduction with GTB.     Education provided re: educated to be cautious with activity level and activities that could be causing irritation to (B) hip joints.   Mena verbalized good understanding of education provided.   No spiritual or educational barriers to learning provided    Assessment     Patient tolerated treatment fair. Continued to complain of (B) hip pain. Patient returns to MD on Monday. Will re-assess next week.     This is a 46 y.o. female referred to outpatient physical therapy and presents with a medical diagnosis of trochanteric bursitis (B) hips and demonstrates limitations as described in the " problem list. Pt will continue to benefit from skilled outpatient physical therapy to address the deficits listed in the problem list, provide pt/family education and maximize pt's level of independence in the home and community environment.     Goals as follows:  Goals:  Short Term Goals (3 weeks)  1. Patient will report < 2/10 pain at rest and < 4/10 pain with activity. Progress 2/1/2022 CBB    2. Patient will improve (B) hip AROM to WNL flexion , Abduction,  extension MET 2/1/2022 CBB  3. Patient will be (I) and compliant with HEP and its progression.Progress 2/1/2022 CBB    4. Patient will perform SLR (I) with no extensor lag.MET 2/1/2022 CBB    5. Patient will perform sit>stand transfer (I) with equal LE weightbearing.MET 2/1/2022 CBB    6. Patient will be able to walk 2 blocks. Progress 2/1/2022 CBB    Long Term Goals (6 weeks)  1. Patient will ambulate mod(I) with minimal gait deviations at community level.Progress 2/1/2022 CBB    2. Patient will improve modified LEFS/LEFS score to > 50/80Progress 2/1/2022 CBB    4. Patient will improve (B) hip strength to > 4-/5 grossly.Progress 2/1/2022 CBB    5. Patient will demonstrate negative results special tests performed. Progress 2/1/2022 CBB    6. Patient will improve sitting tolerance to 2 hours.MET 2/1/2022 CBB    7. Patient will improve standing tolerance to 30 minutes. MET 2/1/2022 CBB            Plan      Continue with established Plan of Care towards PT goals.     Therapist: Esther Singh, PT  2/10/2022

## 2022-02-14 ENCOUNTER — OFFICE VISIT (OUTPATIENT)
Dept: ORTHOPEDICS | Facility: CLINIC | Age: 47
End: 2022-02-14
Payer: MEDICAID

## 2022-02-14 DIAGNOSIS — M25.551 HIP PAIN, BILATERAL: ICD-10-CM

## 2022-02-14 DIAGNOSIS — M25.552 HIP PAIN, BILATERAL: ICD-10-CM

## 2022-02-14 DIAGNOSIS — M70.61 TROCHANTERIC BURSITIS OF BOTH HIPS: Primary | ICD-10-CM

## 2022-02-14 DIAGNOSIS — M70.62 TROCHANTERIC BURSITIS OF BOTH HIPS: Primary | ICD-10-CM

## 2022-02-14 PROCEDURE — 99213 OFFICE O/P EST LOW 20 MIN: CPT | Mod: S$PBB,,, | Performed by: NURSE PRACTITIONER

## 2022-02-14 PROCEDURE — 99212 OFFICE O/P EST SF 10 MIN: CPT | Mod: PBBFAC | Performed by: NURSE PRACTITIONER

## 2022-02-14 PROCEDURE — 99999 PR PBB SHADOW E&M-EST. PATIENT-LVL II: CPT | Mod: PBBFAC,,, | Performed by: NURSE PRACTITIONER

## 2022-02-14 PROCEDURE — 99999 PR PBB SHADOW E&M-EST. PATIENT-LVL II: ICD-10-PCS | Mod: PBBFAC,,, | Performed by: NURSE PRACTITIONER

## 2022-02-14 PROCEDURE — 1159F MED LIST DOCD IN RCRD: CPT | Mod: CPTII,,, | Performed by: NURSE PRACTITIONER

## 2022-02-14 PROCEDURE — 1159F PR MEDICATION LIST DOCUMENTED IN MEDICAL RECORD: ICD-10-PCS | Mod: CPTII,,, | Performed by: NURSE PRACTITIONER

## 2022-02-14 PROCEDURE — 1160F RVW MEDS BY RX/DR IN RCRD: CPT | Mod: CPTII,,, | Performed by: NURSE PRACTITIONER

## 2022-02-14 PROCEDURE — 99213 PR OFFICE/OUTPT VISIT, EST, LEVL III, 20-29 MIN: ICD-10-PCS | Mod: S$PBB,,, | Performed by: NURSE PRACTITIONER

## 2022-02-14 PROCEDURE — 1160F PR REVIEW ALL MEDS BY PRESCRIBER/CLIN PHARMACIST DOCUMENTED: ICD-10-PCS | Mod: CPTII,,, | Performed by: NURSE PRACTITIONER

## 2022-02-14 NOTE — PROGRESS NOTES
"  Subjective:      HIP EXAM - Follow Up Visit Bilateral hips      Patient ID: Mena Mcdnaiel is a 46 y.o. female here for follow up visit for bilateral hip pain. She states that after a few sessions of therapy, she is doing better. She states that she has good and bad days, but presently rates her pain as none in the hips. She is noted with a normal gait.      Ht 5' 5" (1.651 m)   Wt 98.9 kg (218 lb 2.3 oz)   LMP 01/01/2012 Comment: hysterectomy  SpO2 99%   BMI 36.30 kg/m²          Review of Systems:    Constitional: No fevers, sweats, or chills  Musculoskelatal: pain in other joints  Skin:  color, texture and temperature normal  Psychiatric:  No depression and No anxiety      Objective:       Physical Exam:  Gait:  Normal  Trendelenberg test - negative     ROM Hips: Normal, no groin pain     Pain with palpation:   Groin - no  Trochanteric bursae- minimal    PONCE - negative     Strength:   Quads: R - 5/5   L - 5/5  Hams:  R - 5/5    L - 5/5     Numbness / Tingling: no   Distribution: normal        Assessment:   Bilateral hip pain, bursitis     Plan:   1. She is improving with therapy. She will continue as directed.   2. RTC 6 weeks for follow up.   3. Ice and continue home stretches as previous. OTC Tylenol and Advil as needed.         " [facility]

## 2022-02-15 ENCOUNTER — CLINICAL SUPPORT (OUTPATIENT)
Dept: REHABILITATION | Facility: HOSPITAL | Age: 47
End: 2022-02-15
Attending: NURSE PRACTITIONER
Payer: MEDICAID

## 2022-02-15 DIAGNOSIS — M25.551 HIP PAIN, BILATERAL: Primary | ICD-10-CM

## 2022-02-15 DIAGNOSIS — M25.552 HIP PAIN, BILATERAL: Primary | ICD-10-CM

## 2022-02-15 PROCEDURE — 97110 THERAPEUTIC EXERCISES: CPT | Mod: CQ

## 2022-02-15 NOTE — PROGRESS NOTES
"                                                    Physical Therapy Daily Note     Name: Mena Ruiz [facility] Number: [id number]  Diagnosis:   Hip pain, bilateral    Trochanteric bursitis of both hips    Physician: Kobe Ivey NP  Precautions: fall  Visit #: 8 of 12  PTA Visit #: 1  Time In: 1303  Time Out: 1342    Subjective     Pt reports: needs to return back to MD in 6 weeks for a follow up. Pt reports yesterday was a "great day b/c I skipped my housework".   Pain Scale: Mena rates pain on a scale of 0-10 to be 8 currently.     Objective     Mena received individual therapeutic exercises to develop strength, endurance, ROM, flexibility, posture and core stabilization for 39 minutes including: TA brace 20x5", 2x10 LTR instructed pt on core activation, Glut sets 20x5", supine marching alternating (R) and (L) 20x's, clam shells 2x10, adductor squeezes 20x5", bridges 2x10, piriformis stretch 3x15" EILEEN, & HS stretch 3x15" EILEEN w/ belt assist. Seated hip abduction with GTB.     Education provided re: ex technique and core activation.   Mena verbalized good understanding of education provided.   No spiritual or educational barriers to learning provided    Assessment     Post session pain reported at 8/10. Did not progress today's session d/t high pain rating.     This is a 46 y.o. female referred to outpatient physical therapy and presents with a medical diagnosis of trochanteric bursitis (B) hips and demonstrates limitations as described in the problem list. Pt will continue to benefit from skilled outpatient physical therapy to address the deficits listed in the problem list, provide pt/family education and maximize pt's level of independence in the home and community environment.     Goals as follows:  Goals:  Short Term Goals (3 weeks)  1. Patient will report < 2/10 pain at rest and < 4/10 pain with activity. Progress 2/1/2022 CBB    2. Patient will improve (B) hip AROM to WNL flexion , " Abduction,  extension MET 2/1/2022 CBB  3. Patient will be (I) and compliant with HEP and its progression.Progress 2/1/2022 CBB    4. Patient will perform SLR (I) with no extensor lag.MET 2/1/2022 CBB    5. Patient will perform sit>stand transfer (I) with equal LE weightbearing.MET 2/1/2022 CBB    6. Patient will be able to walk 2 blocks. Progress 2/1/2022 CBB    Long Term Goals (6 weeks)  1. Patient will ambulate mod(I) with minimal gait deviations at community level.Progress 2/1/2022 CBB    2. Patient will improve modified LEFS/LEFS score to > 50/80Progress 2/1/2022 CBB    4. Patient will improve (B) hip strength to > 4-/5 grossly.Progress 2/1/2022 CBB    5. Patient will demonstrate negative results special tests performed. Progress 2/1/2022 CBB    6. Patient will improve sitting tolerance to 2 hours.MET 2/1/2022 CBB    7. Patient will improve standing tolerance to 30 minutes. MET 2/1/2022 CBB            Plan      Last visit.     Therapist: Emmy Valdivia, PTA  2/15/2022

## 2022-02-16 ENCOUNTER — CLINICAL SUPPORT (OUTPATIENT)
Dept: REHABILITATION | Facility: HOSPITAL | Age: 47
End: 2022-02-16
Payer: MEDICAID

## 2022-02-16 DIAGNOSIS — M25.551 HIP PAIN, BILATERAL: Primary | ICD-10-CM

## 2022-02-16 DIAGNOSIS — M70.61 TROCHANTERIC BURSITIS OF BOTH HIPS: ICD-10-CM

## 2022-02-16 DIAGNOSIS — M70.62 TROCHANTERIC BURSITIS OF BOTH HIPS: ICD-10-CM

## 2022-02-16 DIAGNOSIS — M25.552 HIP PAIN, BILATERAL: Primary | ICD-10-CM

## 2022-02-16 PROCEDURE — 97110 THERAPEUTIC EXERCISES: CPT

## 2022-02-16 NOTE — PROGRESS NOTES
"                                                    Physical Therapy Daily Note                                                           Discharge Summary     Name: Mena Ruiz [facility] Number: [id number]  Diagnosis:   Hip pain, bilateral    Trochanteric bursitis of both hips    Physician: Kobe Ivey NP  Precautions: fall  Visit #: 9 of 12  PTA Visit #: 0  Time In: 1001  Time Out: 1041    Subjective     Pt reports: Patient reports that she now understands that she needs to try to avoid "twisting" motions. Patient reports doing her housework tends to irritate her symptoms.   Pain Scale: Mena rates pain on a scale of 0-10 to be 8 currently (B) hips.      Objective     Mena received individual therapeutic exercises to develop strength, endurance, ROM, flexibility, posture and core stabilization for 40 minutes including: TA brace 20x5", 2x10 LTR instructed pt on core activation, Glut sets 20x5", supine marching alternating (R) and (L) 20x's, clam shells 2x10, adductor squeezes 20x5", bridges 2x10, piriformis stretch 3x15" EILEEN, & HS stretch 3x15" EILEEN w/ belt assist. Seated hip abduction with GTB.     Education provided re: modifications of daily activities to limited "twisting" motions ; continuation of HEP performance  Mena verbalized good understanding of education provided.   No spiritual or educational barriers to learning provided    LOWER EXTREMITY FUNCTIONAL SCALE  24/80         1. Any of your usual work, housework or school activities   1/4  2. Your usual hobbies, sporting     2/4  3. Getting in and out of tub      1/4  4. Walking between rooms      0/4  5. Putting on shoes or socks      3/4  6. Squatting        2/4  7. Lifting an object from the ground      1/4  8. Performing light activities around the home   1/4  9. Performing heavy activities around the home   0/4  10. Getting in and out of car      4/4  11. Walking 2 blocks       4/4  12.Walking a mile       0/4  13. Getting up and down " 1 flight of stairs    0/4  14. Standing for 1 hour      1/4  15. Sitting for an hour       1/4  16. Running on even ground      1/4  17. Running on uneven ground     1/4  18. Making sharp turns when running fast    1/4  19. Hopping        0/4  20. Rolling over in bed      0/4    Strength:  HIP         Right Left   Extension 4/5 4/5   Flexion 4/5 4/5   Abduction 4/5 4/5   Adduction 4/5 4/5   Int Rotation 4/5 4/5   Ext Rotation 4/5 4/5   KNEE:   []? WFL          [x]? Deficits:4+/5  ANKLE: []? WFL          [x]? Deficits:4+/5    Assessment     Patient continues to have fluctuation of pain symptoms related to daily activities. Patient encouraged to make activity modifications. Patient also educated to follow up with referring provider as needed. Patient with improvement in LEFS. Patient with overall improvement in (B) LE strength.     This is a 46 y.o. female referred to outpatient physical therapy and presents with a medical diagnosis of trochanteric bursitis (B) hips and demonstrates limitations as described in the problem list.    Goals as follows:  Goals:  Short Term Goals (3 weeks)  1. Patient will report < 2/10 pain at rest and < 4/10 pain with activity. Not met  2/16/2022 CB Progress 2/1/2022 CBB    2. Patient will improve (B) hip AROM to WNL flexion , Abduction,  extension MET 2/1/2022 CBB  3. Patient will be (I) and compliant with HEP and its progression. Met 2/16/2022 CB Progress 2/1/2022 CBB    4. Patient will perform SLR (I) with no extensor lag.MET 2/1/2022 CBB    5. Patient will perform sit>stand transfer (I) with equal LE weightbearing.MET 2/1/2022 CBB    6. Patient will be able to walk 2 blocks.Met 2/16/2022 CB Progress 2/1/2022 CBB    Long Term Goals (6 weeks)  1. Patient will ambulate mod(I) with minimal gait deviations at community level. Met 2/16/2022 CB Progress 2/1/2022 CBB    2. Patient will improve modified LEFS/LEFS score to > 50/80  Progress 2/16/2022 CB Progress 2/1/2022 CBB    4. Patient will  improve (B) hip strength to > 4-/5 grossly. Met 2/16/2022 CB Progress 2/1/2022 CBB    5. Patient will demonstrate negative results special tests performed. Met 2/16/2022  CB Progress 2/1/2022 CBB    6. Patient will improve sitting tolerance to 2 hours. Progress 2/16/2022 CB MET 2/1/2022 CBB    7. Patient will improve standing tolerance to 30 minutes. MET 2/1/2022 CBB            Plan     Discharge from PT at this time. Patient encouraged to follow up with referring provider as appropriate.     Therapist: Esther Singh, PT  2/16/2022

## 2022-03-30 ENCOUNTER — OFFICE VISIT (OUTPATIENT)
Dept: OBSTETRICS AND GYNECOLOGY | Facility: CLINIC | Age: 47
End: 2022-03-30
Payer: MEDICAID

## 2022-03-30 VITALS — BODY MASS INDEX: 36.94 KG/M2 | DIASTOLIC BLOOD PRESSURE: 92 MMHG | WEIGHT: 222 LBS | SYSTOLIC BLOOD PRESSURE: 130 MMHG

## 2022-03-30 DIAGNOSIS — Z72.0 TOBACCO ABUSE: Primary | ICD-10-CM

## 2022-03-30 DIAGNOSIS — F41.1 GENERALIZED ANXIETY DISORDER: ICD-10-CM

## 2022-03-30 DIAGNOSIS — F33.41 RECURRENT MAJOR DEPRESSIVE DISORDER, IN PARTIAL REMISSION: ICD-10-CM

## 2022-03-30 DIAGNOSIS — Z78.0 MENOPAUSE: ICD-10-CM

## 2022-03-30 DIAGNOSIS — Z79.890 SURGICAL MENOPAUSE ON HORMONE REPLACEMENT THERAPY: ICD-10-CM

## 2022-03-30 DIAGNOSIS — E89.40 SURGICAL MENOPAUSE ON HORMONE REPLACEMENT THERAPY: ICD-10-CM

## 2022-03-30 PROCEDURE — 99999 PR PBB SHADOW E&M-EST. PATIENT-LVL II: CPT | Mod: PBBFAC,,, | Performed by: OBSTETRICS & GYNECOLOGY

## 2022-03-30 PROCEDURE — 1160F PR REVIEW ALL MEDS BY PRESCRIBER/CLIN PHARMACIST DOCUMENTED: ICD-10-PCS | Mod: CPTII,,, | Performed by: OBSTETRICS & GYNECOLOGY

## 2022-03-30 PROCEDURE — 99999 PR PBB SHADOW E&M-EST. PATIENT-LVL II: ICD-10-PCS | Mod: PBBFAC,,, | Performed by: OBSTETRICS & GYNECOLOGY

## 2022-03-30 PROCEDURE — 99212 OFFICE O/P EST SF 10 MIN: CPT | Mod: PBBFAC | Performed by: OBSTETRICS & GYNECOLOGY

## 2022-03-30 PROCEDURE — 3075F SYST BP GE 130 - 139MM HG: CPT | Mod: CPTII,,, | Performed by: OBSTETRICS & GYNECOLOGY

## 2022-03-30 PROCEDURE — 3080F DIAST BP >= 90 MM HG: CPT | Mod: CPTII,,, | Performed by: OBSTETRICS & GYNECOLOGY

## 2022-03-30 PROCEDURE — 1159F PR MEDICATION LIST DOCUMENTED IN MEDICAL RECORD: ICD-10-PCS | Mod: CPTII,,, | Performed by: OBSTETRICS & GYNECOLOGY

## 2022-03-30 PROCEDURE — 3075F PR MOST RECENT SYSTOLIC BLOOD PRESS GE 130-139MM HG: ICD-10-PCS | Mod: CPTII,,, | Performed by: OBSTETRICS & GYNECOLOGY

## 2022-03-30 PROCEDURE — 99213 PR OFFICE/OUTPT VISIT, EST, LEVL III, 20-29 MIN: ICD-10-PCS | Mod: S$PBB,,, | Performed by: OBSTETRICS & GYNECOLOGY

## 2022-03-30 PROCEDURE — 1160F RVW MEDS BY RX/DR IN RCRD: CPT | Mod: CPTII,,, | Performed by: OBSTETRICS & GYNECOLOGY

## 2022-03-30 PROCEDURE — 1159F MED LIST DOCD IN RCRD: CPT | Mod: CPTII,,, | Performed by: OBSTETRICS & GYNECOLOGY

## 2022-03-30 PROCEDURE — 3008F PR BODY MASS INDEX (BMI) DOCUMENTED: ICD-10-PCS | Mod: CPTII,,, | Performed by: OBSTETRICS & GYNECOLOGY

## 2022-03-30 PROCEDURE — 3008F BODY MASS INDEX DOCD: CPT | Mod: CPTII,,, | Performed by: OBSTETRICS & GYNECOLOGY

## 2022-03-30 PROCEDURE — 3080F PR MOST RECENT DIASTOLIC BLOOD PRESSURE >= 90 MM HG: ICD-10-PCS | Mod: CPTII,,, | Performed by: OBSTETRICS & GYNECOLOGY

## 2022-03-30 PROCEDURE — 99213 OFFICE O/P EST LOW 20 MIN: CPT | Mod: S$PBB,,, | Performed by: OBSTETRICS & GYNECOLOGY

## 2022-03-30 RX ORDER — CLONAZEPAM 1 MG/1
TABLET ORAL
Qty: 30 TABLET | Refills: 1 | Status: SHIPPED | OUTPATIENT
Start: 2022-03-30 | End: 2022-05-12

## 2022-03-30 RX ORDER — ESTRADIOL 2 MG/1
TABLET ORAL
Qty: 30 TABLET | Refills: 5 | Status: SHIPPED | OUTPATIENT
Start: 2022-03-30 | End: 2022-07-11 | Stop reason: SDUPTHER

## 2022-03-30 RX ORDER — ESCITALOPRAM OXALATE 20 MG/1
20 TABLET ORAL DAILY
Qty: 30 TABLET | Refills: 5 | Status: SHIPPED | OUTPATIENT
Start: 2022-03-30 | End: 2022-07-11 | Stop reason: SDUPTHER

## 2022-03-30 NOTE — PROGRESS NOTES
Subjective:    Patient ID: Mena Mcdaniel is a 46 y.o. y.o. female    Chief Complaint:   Chief Complaint   Patient presents with    Follow-up       History of Present Illness:  Mena presents today for follow up of hormone replacement therapy and anxiety medication.  She states that she is feeling much better.  She was prescribed Estratest but could not afford medication as her insurance did not cover it.  She later learned that she was drinking too much caffeine at the same time as taking her estradiol and this was causing hot flashes.  She states she has decreased her caffeine intake and is feeling much better.  She desires to continue medication.  She also states her anxiety and depression are control with medication    Review of Systems   Constitutional: Negative for chills, fatigue and fever.   Respiratory: Negative for shortness of breath.    Cardiovascular: Negative for chest pain.   Gastrointestinal: Negative for abdominal pain, constipation, diarrhea and nausea.   Genitourinary: Negative for bladder incontinence, dysuria, hot flashes, pelvic pain and vaginal bleeding.   Neurological: Negative for headaches.   Psychiatric/Behavioral: Positive for depression (Controlled with meds). The patient is nervous/anxious (Controlled with meds).          Objective:    Vital Signs:  Vitals:    03/30/22 1129   BP: (!) 130/92     Wt Readings from Last 1 Encounters:   03/30/22 100.7 kg (222 lb)     Body mass index is 36.94 kg/m².    Physical Exam:  General:  alert, no distress    Exam deferred-discussion only     Use and side effects of medication reiterated.  All questions answered and patient desires to continue medication    I spent a total of 20 minutes on the day of the visit.  This includes face to face time and non-face to face time preparing to see the patient (eg, review of tests), obtaining and/or reviewing separately obtained history, documenting clinical information in the electronic or other health  record, independently interpreting results and communicating results to the patient/family/caregiver, or care coordinator.          Assessment:      1. Tobacco abuse    2. Generalized anxiety disorder    3. Surgical menopause on hormone replacement therapy    4. Recurrent major depressive disorder, in partial remission    5. Menopause          Plan:      Tobacco abuse    Generalized anxiety disorder  -     clonazePAM (KLONOPIN) 1 MG tablet; TAKE ONE TABLET BY MOUTH EVERY EVENING. **THANK YOU**  Dispense: 30 tablet; Refill: 1  -     EScitalopram oxalate (LEXAPRO) 20 MG tablet; Take 1 tablet (20 mg total) by mouth once daily.  Dispense: 30 tablet; Refill: 5    Surgical menopause on hormone replacement therapy  -     estradioL (ESTRACE) 2 MG tablet; TAKE ONE TABLET BY MOUTH ONCE DAILY **THANK YOU**  Dispense: 30 tablet; Refill: 5    Recurrent major depressive disorder, in partial remission  -     EScitalopram oxalate (LEXAPRO) 20 MG tablet; Take 1 tablet (20 mg total) by mouth once daily.  Dispense: 30 tablet; Refill: 5    Menopause    rtc 6 months       Rosita Tavares MD, FACOG   03/30/2022 11:58 AM

## 2022-03-31 ENCOUNTER — OFFICE VISIT (OUTPATIENT)
Dept: SURGERY | Facility: CLINIC | Age: 47
End: 2022-03-31
Payer: MEDICAID

## 2022-03-31 VITALS
OXYGEN SATURATION: 97 % | DIASTOLIC BLOOD PRESSURE: 78 MMHG | TEMPERATURE: 98 F | BODY MASS INDEX: 39.2 KG/M2 | HEIGHT: 63 IN | HEART RATE: 72 BPM | SYSTOLIC BLOOD PRESSURE: 127 MMHG | WEIGHT: 221.25 LBS

## 2022-03-31 DIAGNOSIS — K59.09 CHRONIC CONSTIPATION: ICD-10-CM

## 2022-03-31 DIAGNOSIS — K62.5 RECTAL BLEEDING: Primary | ICD-10-CM

## 2022-03-31 PROCEDURE — 99999 PR PBB SHADOW E&M-EST. PATIENT-LVL III: ICD-10-PCS | Mod: PBBFAC,,, | Performed by: STUDENT IN AN ORGANIZED HEALTH CARE EDUCATION/TRAINING PROGRAM

## 2022-03-31 PROCEDURE — 1159F PR MEDICATION LIST DOCUMENTED IN MEDICAL RECORD: ICD-10-PCS | Mod: CPTII,,, | Performed by: STUDENT IN AN ORGANIZED HEALTH CARE EDUCATION/TRAINING PROGRAM

## 2022-03-31 PROCEDURE — 99999 PR PBB SHADOW E&M-EST. PATIENT-LVL III: CPT | Mod: PBBFAC,,, | Performed by: STUDENT IN AN ORGANIZED HEALTH CARE EDUCATION/TRAINING PROGRAM

## 2022-03-31 PROCEDURE — 1159F MED LIST DOCD IN RCRD: CPT | Mod: CPTII,,, | Performed by: STUDENT IN AN ORGANIZED HEALTH CARE EDUCATION/TRAINING PROGRAM

## 2022-03-31 PROCEDURE — 3008F PR BODY MASS INDEX (BMI) DOCUMENTED: ICD-10-PCS | Mod: CPTII,,, | Performed by: STUDENT IN AN ORGANIZED HEALTH CARE EDUCATION/TRAINING PROGRAM

## 2022-03-31 PROCEDURE — 3074F SYST BP LT 130 MM HG: CPT | Mod: CPTII,,, | Performed by: STUDENT IN AN ORGANIZED HEALTH CARE EDUCATION/TRAINING PROGRAM

## 2022-03-31 PROCEDURE — 99213 OFFICE O/P EST LOW 20 MIN: CPT | Mod: PBBFAC | Performed by: STUDENT IN AN ORGANIZED HEALTH CARE EDUCATION/TRAINING PROGRAM

## 2022-03-31 PROCEDURE — 3008F BODY MASS INDEX DOCD: CPT | Mod: CPTII,,, | Performed by: STUDENT IN AN ORGANIZED HEALTH CARE EDUCATION/TRAINING PROGRAM

## 2022-03-31 PROCEDURE — 3078F PR MOST RECENT DIASTOLIC BLOOD PRESSURE < 80 MM HG: ICD-10-PCS | Mod: CPTII,,, | Performed by: STUDENT IN AN ORGANIZED HEALTH CARE EDUCATION/TRAINING PROGRAM

## 2022-03-31 PROCEDURE — 99213 OFFICE O/P EST LOW 20 MIN: CPT | Mod: S$PBB,,, | Performed by: STUDENT IN AN ORGANIZED HEALTH CARE EDUCATION/TRAINING PROGRAM

## 2022-03-31 PROCEDURE — 99213 PR OFFICE/OUTPT VISIT, EST, LEVL III, 20-29 MIN: ICD-10-PCS | Mod: S$PBB,,, | Performed by: STUDENT IN AN ORGANIZED HEALTH CARE EDUCATION/TRAINING PROGRAM

## 2022-03-31 PROCEDURE — 3078F DIAST BP <80 MM HG: CPT | Mod: CPTII,,, | Performed by: STUDENT IN AN ORGANIZED HEALTH CARE EDUCATION/TRAINING PROGRAM

## 2022-03-31 PROCEDURE — 3074F PR MOST RECENT SYSTOLIC BLOOD PRESSURE < 130 MM HG: ICD-10-PCS | Mod: CPTII,,, | Performed by: STUDENT IN AN ORGANIZED HEALTH CARE EDUCATION/TRAINING PROGRAM

## 2022-03-31 RX ORDER — HYDROCORTISONE 25 MG/G
CREAM TOPICAL 2 TIMES DAILY
Qty: 28 G | Refills: 0 | Status: SHIPPED | OUTPATIENT
Start: 2022-03-31 | End: 2022-04-14

## 2022-03-31 RX ORDER — CHLORPHENIRAMIN/PSEUDOEPHED/DM 1-15-5MG/5
LIQUID (ML) ORAL DAILY
Status: ON HOLD | COMMUNITY
Start: 2021-11-02 | End: 2023-12-06 | Stop reason: CLARIF

## 2022-04-18 ENCOUNTER — OFFICE VISIT (OUTPATIENT)
Dept: SURGERY | Facility: CLINIC | Age: 47
End: 2022-04-18
Payer: MEDICAID

## 2022-04-18 VITALS
SYSTOLIC BLOOD PRESSURE: 128 MMHG | HEART RATE: 65 BPM | OXYGEN SATURATION: 95 % | DIASTOLIC BLOOD PRESSURE: 60 MMHG | WEIGHT: 228.06 LBS | BODY MASS INDEX: 40.4 KG/M2

## 2022-04-18 DIAGNOSIS — I83.813 VARICOSE VEINS OF BOTH LOWER EXTREMITIES WITH PAIN: ICD-10-CM

## 2022-04-18 DIAGNOSIS — K59.09 CHRONIC CONSTIPATION: ICD-10-CM

## 2022-04-18 DIAGNOSIS — Z86.010 HISTORY OF COLON POLYPS: Primary | ICD-10-CM

## 2022-04-18 PROCEDURE — 99214 OFFICE O/P EST MOD 30 MIN: CPT | Mod: S$PBB,,, | Performed by: STUDENT IN AN ORGANIZED HEALTH CARE EDUCATION/TRAINING PROGRAM

## 2022-04-18 PROCEDURE — 3074F SYST BP LT 130 MM HG: CPT | Mod: CPTII,,, | Performed by: STUDENT IN AN ORGANIZED HEALTH CARE EDUCATION/TRAINING PROGRAM

## 2022-04-18 PROCEDURE — 3008F BODY MASS INDEX DOCD: CPT | Mod: CPTII,,, | Performed by: STUDENT IN AN ORGANIZED HEALTH CARE EDUCATION/TRAINING PROGRAM

## 2022-04-18 PROCEDURE — 3078F PR MOST RECENT DIASTOLIC BLOOD PRESSURE < 80 MM HG: ICD-10-PCS | Mod: CPTII,,, | Performed by: STUDENT IN AN ORGANIZED HEALTH CARE EDUCATION/TRAINING PROGRAM

## 2022-04-18 PROCEDURE — 3078F DIAST BP <80 MM HG: CPT | Mod: CPTII,,, | Performed by: STUDENT IN AN ORGANIZED HEALTH CARE EDUCATION/TRAINING PROGRAM

## 2022-04-18 PROCEDURE — 1159F MED LIST DOCD IN RCRD: CPT | Mod: CPTII,,, | Performed by: STUDENT IN AN ORGANIZED HEALTH CARE EDUCATION/TRAINING PROGRAM

## 2022-04-18 PROCEDURE — 99999 PR PBB SHADOW E&M-EST. PATIENT-LVL III: ICD-10-PCS | Mod: PBBFAC,,, | Performed by: STUDENT IN AN ORGANIZED HEALTH CARE EDUCATION/TRAINING PROGRAM

## 2022-04-18 PROCEDURE — 3008F PR BODY MASS INDEX (BMI) DOCUMENTED: ICD-10-PCS | Mod: CPTII,,, | Performed by: STUDENT IN AN ORGANIZED HEALTH CARE EDUCATION/TRAINING PROGRAM

## 2022-04-18 PROCEDURE — 99999 PR PBB SHADOW E&M-EST. PATIENT-LVL III: CPT | Mod: PBBFAC,,, | Performed by: STUDENT IN AN ORGANIZED HEALTH CARE EDUCATION/TRAINING PROGRAM

## 2022-04-18 PROCEDURE — 99213 OFFICE O/P EST LOW 20 MIN: CPT | Mod: PBBFAC | Performed by: STUDENT IN AN ORGANIZED HEALTH CARE EDUCATION/TRAINING PROGRAM

## 2022-04-18 PROCEDURE — 1159F PR MEDICATION LIST DOCUMENTED IN MEDICAL RECORD: ICD-10-PCS | Mod: CPTII,,, | Performed by: STUDENT IN AN ORGANIZED HEALTH CARE EDUCATION/TRAINING PROGRAM

## 2022-04-18 PROCEDURE — 3074F PR MOST RECENT SYSTOLIC BLOOD PRESSURE < 130 MM HG: ICD-10-PCS | Mod: CPTII,,, | Performed by: STUDENT IN AN ORGANIZED HEALTH CARE EDUCATION/TRAINING PROGRAM

## 2022-04-18 PROCEDURE — 99214 PR OFFICE/OUTPT VISIT, EST, LEVL IV, 30-39 MIN: ICD-10-PCS | Mod: S$PBB,,, | Performed by: STUDENT IN AN ORGANIZED HEALTH CARE EDUCATION/TRAINING PROGRAM

## 2022-04-24 PROBLEM — I83.813 VARICOSE VEINS OF BOTH LOWER EXTREMITIES WITH PAIN: Status: ACTIVE | Noted: 2022-04-24

## 2022-05-18 ENCOUNTER — TELEPHONE (OUTPATIENT)
Dept: SURGERY | Facility: CLINIC | Age: 47
End: 2022-05-18
Payer: MEDICAID

## 2022-05-19 ENCOUNTER — OFFICE VISIT (OUTPATIENT)
Dept: SURGERY | Facility: CLINIC | Age: 47
End: 2022-05-19
Payer: MEDICAID

## 2022-05-19 VITALS
WEIGHT: 232.38 LBS | BODY MASS INDEX: 41.18 KG/M2 | OXYGEN SATURATION: 97 % | HEIGHT: 63 IN | DIASTOLIC BLOOD PRESSURE: 75 MMHG | SYSTOLIC BLOOD PRESSURE: 135 MMHG | HEART RATE: 86 BPM

## 2022-05-19 DIAGNOSIS — I83.813 VARICOSE VEINS OF BOTH LOWER EXTREMITIES WITH PAIN: Primary | ICD-10-CM

## 2022-05-19 PROCEDURE — 3008F BODY MASS INDEX DOCD: CPT | Mod: CPTII,,, | Performed by: STUDENT IN AN ORGANIZED HEALTH CARE EDUCATION/TRAINING PROGRAM

## 2022-05-19 PROCEDURE — 3075F PR MOST RECENT SYSTOLIC BLOOD PRESS GE 130-139MM HG: ICD-10-PCS | Mod: CPTII,,, | Performed by: STUDENT IN AN ORGANIZED HEALTH CARE EDUCATION/TRAINING PROGRAM

## 2022-05-19 PROCEDURE — 3075F SYST BP GE 130 - 139MM HG: CPT | Mod: CPTII,,, | Performed by: STUDENT IN AN ORGANIZED HEALTH CARE EDUCATION/TRAINING PROGRAM

## 2022-05-19 PROCEDURE — 99999 PR PBB SHADOW E&M-EST. PATIENT-LVL III: ICD-10-PCS | Mod: PBBFAC,,, | Performed by: STUDENT IN AN ORGANIZED HEALTH CARE EDUCATION/TRAINING PROGRAM

## 2022-05-19 PROCEDURE — 3078F PR MOST RECENT DIASTOLIC BLOOD PRESSURE < 80 MM HG: ICD-10-PCS | Mod: CPTII,,, | Performed by: STUDENT IN AN ORGANIZED HEALTH CARE EDUCATION/TRAINING PROGRAM

## 2022-05-19 PROCEDURE — 1159F PR MEDICATION LIST DOCUMENTED IN MEDICAL RECORD: ICD-10-PCS | Mod: CPTII,,, | Performed by: STUDENT IN AN ORGANIZED HEALTH CARE EDUCATION/TRAINING PROGRAM

## 2022-05-19 PROCEDURE — 99213 OFFICE O/P EST LOW 20 MIN: CPT | Mod: S$PBB,,, | Performed by: STUDENT IN AN ORGANIZED HEALTH CARE EDUCATION/TRAINING PROGRAM

## 2022-05-19 PROCEDURE — 99999 PR PBB SHADOW E&M-EST. PATIENT-LVL III: CPT | Mod: PBBFAC,,, | Performed by: STUDENT IN AN ORGANIZED HEALTH CARE EDUCATION/TRAINING PROGRAM

## 2022-05-19 PROCEDURE — 3078F DIAST BP <80 MM HG: CPT | Mod: CPTII,,, | Performed by: STUDENT IN AN ORGANIZED HEALTH CARE EDUCATION/TRAINING PROGRAM

## 2022-05-19 PROCEDURE — 1159F MED LIST DOCD IN RCRD: CPT | Mod: CPTII,,, | Performed by: STUDENT IN AN ORGANIZED HEALTH CARE EDUCATION/TRAINING PROGRAM

## 2022-05-19 PROCEDURE — 99213 OFFICE O/P EST LOW 20 MIN: CPT | Mod: PBBFAC | Performed by: STUDENT IN AN ORGANIZED HEALTH CARE EDUCATION/TRAINING PROGRAM

## 2022-05-19 PROCEDURE — 99213 PR OFFICE/OUTPT VISIT, EST, LEVL III, 20-29 MIN: ICD-10-PCS | Mod: S$PBB,,, | Performed by: STUDENT IN AN ORGANIZED HEALTH CARE EDUCATION/TRAINING PROGRAM

## 2022-05-19 PROCEDURE — 3008F PR BODY MASS INDEX (BMI) DOCUMENTED: ICD-10-PCS | Mod: CPTII,,, | Performed by: STUDENT IN AN ORGANIZED HEALTH CARE EDUCATION/TRAINING PROGRAM

## 2022-05-31 NOTE — PROGRESS NOTES
[facility]  General Surgery Clinic Progress Note    HPI:  Mena Mcdaniel is a 47 y.o. female with history of superficial varicose veins who presents for follow up.  Off and on compliance with compression socks.  Continues to smoke.  Denies CP, SOB, abdominal pain, nausea, vomiting, fevers, or chills.       ROS:  Negative except above    PE:  Vitals:    05/19/22 1344   BP: 135/75   Pulse: 86       Gen: Alert and oriented x3, judgement intact, NAD  CV: RRR  Resp: CTAB; breaths non-labored and symmetrical  Abd: Soft, NT, ND, BS+  Extremities: No c/c/e multiple tortuous superficial veins to bilateral legs.  No pitting edema or stigmata of stasis dermatitis     A/P:  47 y.o. female with history of colon polyps and varicose veins who presents for follow up  -Amb referral to vascular surgery - pt encouraged to increase compliance with compression socks and smoking cessation  -Repeat colonoscopy in 5 years  -RTC in 5 years     Rajwinder Mcdermott MD  General Surgery   [phone]        5/31/2022  3:27 PM

## 2022-07-11 ENCOUNTER — TELEPHONE (OUTPATIENT)
Dept: OBSTETRICS AND GYNECOLOGY | Facility: CLINIC | Age: 47
End: 2022-07-11
Payer: MEDICAID

## 2022-07-11 DIAGNOSIS — E89.40 SURGICAL MENOPAUSE ON HORMONE REPLACEMENT THERAPY: ICD-10-CM

## 2022-07-11 DIAGNOSIS — F41.1 GENERALIZED ANXIETY DISORDER: ICD-10-CM

## 2022-07-11 DIAGNOSIS — Z79.890 SURGICAL MENOPAUSE ON HORMONE REPLACEMENT THERAPY: ICD-10-CM

## 2022-07-11 DIAGNOSIS — F33.41 RECURRENT MAJOR DEPRESSIVE DISORDER, IN PARTIAL REMISSION: ICD-10-CM

## 2022-07-11 RX ORDER — ESCITALOPRAM OXALATE 20 MG/1
20 TABLET ORAL DAILY
Qty: 30 TABLET | Refills: 5 | Status: SHIPPED | OUTPATIENT
Start: 2022-07-11 | End: 2022-10-03 | Stop reason: SDUPTHER

## 2022-07-11 RX ORDER — ESTRADIOL 2 MG/1
TABLET ORAL
Qty: 30 TABLET | Refills: 5 | Status: SHIPPED | OUTPATIENT
Start: 2022-07-11 | End: 2022-10-03 | Stop reason: SDUPTHER

## 2022-07-11 RX ORDER — CLONAZEPAM 1 MG/1
1 TABLET ORAL NIGHTLY
Qty: 30 TABLET | Refills: 1 | Status: SHIPPED | OUTPATIENT
Start: 2022-07-11 | End: 2022-09-12

## 2022-08-08 ENCOUNTER — HOSPITAL ENCOUNTER (EMERGENCY)
Facility: HOSPITAL | Age: 47
Discharge: HOME | End: 2022-08-08
Attending: EMERGENCY MEDICINE
Payer: MEDICAID

## 2022-08-08 VITALS
HEART RATE: 86 BPM | TEMPERATURE: 98 F | WEIGHT: 230 LBS | BODY MASS INDEX: 40.75 KG/M2 | OXYGEN SATURATION: 98 % | SYSTOLIC BLOOD PRESSURE: 143 MMHG | HEIGHT: 63 IN | RESPIRATION RATE: 18 BRPM | DIASTOLIC BLOOD PRESSURE: 82 MMHG

## 2022-08-08 DIAGNOSIS — M25.561 ACUTE PAIN OF RIGHT KNEE: Primary | ICD-10-CM

## 2022-08-08 PROCEDURE — 63600175 PHARM REV CODE 636 W HCPCS: Performed by: NURSE PRACTITIONER

## 2022-08-08 PROCEDURE — 96372 THER/PROPH/DIAG INJ SC/IM: CPT | Performed by: NURSE PRACTITIONER

## 2022-08-08 PROCEDURE — 99284 EMERGENCY DEPT VISIT MOD MDM: CPT

## 2022-08-08 RX ORDER — DEXAMETHASONE SODIUM PHOSPHATE 4 MG/ML
8 INJECTION, SOLUTION INTRA-ARTICULAR; INTRALESIONAL; INTRAMUSCULAR; INTRAVENOUS; SOFT TISSUE
Status: COMPLETED | OUTPATIENT
Start: 2022-08-08 | End: 2022-08-08

## 2022-08-08 RX ORDER — PREDNISONE 20 MG/1
40 TABLET ORAL DAILY
Qty: 10 TABLET | Refills: 0 | Status: SHIPPED | OUTPATIENT
Start: 2022-08-08 | End: 2022-08-13

## 2022-08-08 RX ADMIN — DEXAMETHASONE SODIUM PHOSPHATE 8 MG: 4 INJECTION, SOLUTION INTRA-ARTICULAR; INTRALESIONAL; INTRAMUSCULAR; INTRAVENOUS; SOFT TISSUE at 05:08

## 2022-08-08 NOTE — DISCHARGE INSTRUCTIONS
Take medication as directed.  It is important that you avoid putting weight on the right lower extremity and apply ice to help reduce pain and swelling.  Follow-up with your doctor in 1 week and return to the emergency room for worsening condition.

## 2022-08-08 NOTE — ED PROVIDER NOTES
"Encounter Date: 8/8/2022       History     Chief Complaint   Patient presents with    Knee Pain     Pt stated that since yesterday, her right knee keeps "popping" and has become swollen. Denied injury/trauma.        This is a 47-year-old white female with a history of depression and obesity who presents to the emergency department with complaints of right knee pain.  Patient relates that yesterday she began with gradual onset right knee pain that is constant and exacerbated by ambulation and moving the knee.  She also reports intermittent "popping "sensation.  Patient denies previous knee pain/ injury, recent injury, fever, myalgias, or previous similar symptoms.  She has been taking Tylenol and ibuprofen with minimal relief of symptoms.        Review of patient's allergies indicates:   Allergen Reactions    Ceclor [cefaclor] Hives    Clindamycin Hives    Erythromycin Hives    Pcn [penicillins] Hives     No longer allergic    Tramadol Hives     Past Medical History:   Diagnosis Date    Chronic constipation     Depression     Family history of colon cancer     Menopause     Wears dentures     FULL     Past Surgical History:   Procedure Laterality Date    COLD KNIFE CONIZATION OF CERVIX      HYSTERECTOMY      OOPHORECTOMY      TONSILLECTOMY      TONSILLECTOMY       Family History   Problem Relation Age of Onset    Heart failure Mother     Hypertension Father     No Known Problems Sister     No Known Problems Daughter     No Known Problems Maternal Aunt     No Known Problems Maternal Uncle     No Known Problems Paternal Aunt     No Known Problems Paternal Uncle     No Known Problems Maternal Grandmother     No Known Problems Maternal Grandfather     No Known Problems Paternal Grandmother     No Known Problems Paternal Grandfather     Breast cancer Neg Hx     Ovarian cancer Neg Hx     BRCA 1/2 Neg Hx      Social History     Tobacco Use    Smoking status: Current Every Day Smoker     " Packs/day: 1.00     Years: 14.00     Pack years: 14.00     Types: Cigarettes    Smokeless tobacco: Never Used   Substance Use Topics    Alcohol use: Not Currently     Alcohol/week: 0.0 standard drinks    Drug use: Never     Review of Systems   Constitutional: Negative for fever.   Musculoskeletal: Positive for arthralgias, gait problem and joint swelling. Negative for back pain, myalgias, neck pain and neck stiffness.   Skin: Negative.    Neurological: Negative for numbness.       Physical Exam     Initial Vitals [08/08/22 1718]   BP Pulse Resp Temp SpO2   (!) 143/82 86 18 98.3 °F (36.8 °C) 98 %      MAP       --         Physical Exam    Nursing note and vitals reviewed.  Constitutional: She appears well-developed and well-nourished. She is active. No distress.   HENT:   Head: Normocephalic and atraumatic.   Eyes: EOM are normal. Pupils are equal, round, and reactive to light.   Neck: Neck supple.   Normal range of motion.  Cardiovascular: Normal rate.   Pulmonary/Chest: No respiratory distress.   Musculoskeletal:      Cervical back: Normal range of motion and neck supple.      Comments: The right knee appears mildly swollen upon inspection, no rash discoloration.  Patient is mildly tender to palpation globally and experiences pain with range of motion.  Distally neurovascularly intact.  No calf tenderness.     Neurological: She is alert and oriented to person, place, and time. GCS score is 15. GCS eye subscore is 4. GCS verbal subscore is 5. GCS motor subscore is 6.   Skin: Skin is warm and dry. Capillary refill takes less than 2 seconds.   Psychiatric: She has a normal mood and affect. Her behavior is normal. Thought content normal.         ED Course   Procedures  Labs Reviewed - No data to display       Imaging Results    None          Medications   dexamethasone injection 8 mg (8 mg Intramuscular Given 8/8/22 1729)                          Clinical Impression:   Final diagnoses:  [M25.561] Acute pain of right  knee (Primary)          ED Disposition Condition    Discharge Stable        ED Prescriptions     Medication Sig Dispense Start Date End Date Auth. Provider    predniSONE (DELTASONE) 20 MG tablet Take 2 tablets (40 mg total) by mouth once daily. for 5 days 10 tablet 8/8/2022 8/13/2022 Maggi Suggs NP        Follow-up Information     Follow up With Specialties Details Why Contact Info    Viola Duncan NP Nurse Practitioner Schedule an appointment as soon as possible for a visit in 2 days for re-evaluation of today's complaint [facility] [facility] [address]  [phone]             Maggi Suggs NP  08/08/22 0719

## 2022-08-08 NOTE — Clinical Note
"Mena Lagunas" Violeta was seen and treated in our emergency department on 8/8/2022.  She may return to work on 08/10/2022.       If you have any questions or concerns, please don't hesitate to call.      Maggi Suggs NP"

## 2022-09-12 ENCOUNTER — TELEPHONE (OUTPATIENT)
Dept: OBSTETRICS AND GYNECOLOGY | Facility: CLINIC | Age: 47
End: 2022-09-12
Payer: MEDICAID

## 2022-10-03 ENCOUNTER — OFFICE VISIT (OUTPATIENT)
Dept: OBSTETRICS AND GYNECOLOGY | Facility: CLINIC | Age: 47
End: 2022-10-03
Payer: MEDICAID

## 2022-10-03 VITALS
HEIGHT: 63 IN | HEART RATE: 93 BPM | SYSTOLIC BLOOD PRESSURE: 155 MMHG | WEIGHT: 229 LBS | DIASTOLIC BLOOD PRESSURE: 94 MMHG | BODY MASS INDEX: 40.57 KG/M2

## 2022-10-03 DIAGNOSIS — Z72.0 TOBACCO ABUSE: Primary | ICD-10-CM

## 2022-10-03 DIAGNOSIS — F41.1 GENERALIZED ANXIETY DISORDER: ICD-10-CM

## 2022-10-03 DIAGNOSIS — F33.41 RECURRENT MAJOR DEPRESSIVE DISORDER, IN PARTIAL REMISSION: ICD-10-CM

## 2022-10-03 DIAGNOSIS — Z79.890 SURGICAL MENOPAUSE ON HORMONE REPLACEMENT THERAPY: ICD-10-CM

## 2022-10-03 DIAGNOSIS — E89.40 SURGICAL MENOPAUSE ON HORMONE REPLACEMENT THERAPY: ICD-10-CM

## 2022-10-03 PROCEDURE — 99213 OFFICE O/P EST LOW 20 MIN: CPT | Mod: S$PBB,,, | Performed by: OBSTETRICS & GYNECOLOGY

## 2022-10-03 PROCEDURE — 3077F SYST BP >= 140 MM HG: CPT | Mod: CPTII,,, | Performed by: OBSTETRICS & GYNECOLOGY

## 2022-10-03 PROCEDURE — 3080F PR MOST RECENT DIASTOLIC BLOOD PRESSURE >= 90 MM HG: ICD-10-PCS | Mod: CPTII,,, | Performed by: OBSTETRICS & GYNECOLOGY

## 2022-10-03 PROCEDURE — 99999 PR PBB SHADOW E&M-EST. PATIENT-LVL III: ICD-10-PCS | Mod: PBBFAC,,, | Performed by: OBSTETRICS & GYNECOLOGY

## 2022-10-03 PROCEDURE — 1160F PR REVIEW ALL MEDS BY PRESCRIBER/CLIN PHARMACIST DOCUMENTED: ICD-10-PCS | Mod: CPTII,,, | Performed by: OBSTETRICS & GYNECOLOGY

## 2022-10-03 PROCEDURE — 1159F MED LIST DOCD IN RCRD: CPT | Mod: CPTII,,, | Performed by: OBSTETRICS & GYNECOLOGY

## 2022-10-03 PROCEDURE — 99213 OFFICE O/P EST LOW 20 MIN: CPT | Mod: PBBFAC | Performed by: OBSTETRICS & GYNECOLOGY

## 2022-10-03 PROCEDURE — 1160F RVW MEDS BY RX/DR IN RCRD: CPT | Mod: CPTII,,, | Performed by: OBSTETRICS & GYNECOLOGY

## 2022-10-03 PROCEDURE — 3008F PR BODY MASS INDEX (BMI) DOCUMENTED: ICD-10-PCS | Mod: CPTII,,, | Performed by: OBSTETRICS & GYNECOLOGY

## 2022-10-03 PROCEDURE — 3080F DIAST BP >= 90 MM HG: CPT | Mod: CPTII,,, | Performed by: OBSTETRICS & GYNECOLOGY

## 2022-10-03 PROCEDURE — 99999 PR PBB SHADOW E&M-EST. PATIENT-LVL III: CPT | Mod: PBBFAC,,, | Performed by: OBSTETRICS & GYNECOLOGY

## 2022-10-03 PROCEDURE — 1159F PR MEDICATION LIST DOCUMENTED IN MEDICAL RECORD: ICD-10-PCS | Mod: CPTII,,, | Performed by: OBSTETRICS & GYNECOLOGY

## 2022-10-03 PROCEDURE — 3077F PR MOST RECENT SYSTOLIC BLOOD PRESSURE >= 140 MM HG: ICD-10-PCS | Mod: CPTII,,, | Performed by: OBSTETRICS & GYNECOLOGY

## 2022-10-03 PROCEDURE — 3008F BODY MASS INDEX DOCD: CPT | Mod: CPTII,,, | Performed by: OBSTETRICS & GYNECOLOGY

## 2022-10-03 PROCEDURE — 99213 PR OFFICE/OUTPT VISIT, EST, LEVL III, 20-29 MIN: ICD-10-PCS | Mod: S$PBB,,, | Performed by: OBSTETRICS & GYNECOLOGY

## 2022-10-03 RX ORDER — CLONAZEPAM 1 MG/1
1 TABLET ORAL NIGHTLY
Qty: 30 TABLET | Refills: 1 | Status: SHIPPED | OUTPATIENT
Start: 2022-10-03 | End: 2022-11-09 | Stop reason: SDUPTHER

## 2022-10-03 RX ORDER — ESTRADIOL 2 MG/1
TABLET ORAL
Qty: 30 TABLET | Refills: 5 | Status: SHIPPED | OUTPATIENT
Start: 2022-10-03 | End: 2022-11-09 | Stop reason: SDUPTHER

## 2022-10-03 RX ORDER — ESCITALOPRAM OXALATE 20 MG/1
20 TABLET ORAL DAILY
Qty: 30 TABLET | Refills: 5 | Status: SHIPPED | OUTPATIENT
Start: 2022-10-03 | End: 2022-11-09 | Stop reason: SDUPTHER

## 2022-10-03 NOTE — PROGRESS NOTES
Subjective:    Patient ID: Mena Mcdaniel is a 47 y.o. y.o. female    Chief Complaint:   Chief Complaint   Patient presents with    Follow-up     Pt here for medication f/u no complaints       History of Present Illness:  Mena presents today for follow up of hormone replacement therapy and depression and anxiety meds.  Patient states she is doing well and feeling great.  She has no complaints today.  She desires to continue medication.      Review of Systems   Constitutional:  Negative for chills, fatigue and fever.   Respiratory:  Negative for shortness of breath.    Cardiovascular:  Negative for chest pain.   Gastrointestinal:  Negative for abdominal pain, constipation, diarrhea and nausea.   Genitourinary:  Negative for bladder incontinence, dysuria, hot flashes, pelvic pain and vaginal bleeding.   Neurological:  Negative for headaches.   Psychiatric/Behavioral:  Negative for depression.        Objective:    Vital Signs:  Vitals:    10/03/22 1347   BP: (!) 155/94   Pulse: 93     Wt Readings from Last 1 Encounters:   10/03/22 103.9 kg (229 lb)     Body mass index is 40.57 kg/m².    Physical Exam:  General:  alert, no distress   Abdomen:   Soft, nontender   Extremities: No cyanosis, clubbing, edema     Use and side effects of medications reiterated and patient desires continue meds.  All questions answered    I spent a total of 20 minutes on the day of the visit.  This includes face to face time and non-face to face time preparing to see the patient (eg, review of tests), obtaining and/or reviewing separately obtained history, documenting clinical information in the electronic or other health record, independently interpreting results and communicating results to the patient/family/caregiver, or care coordinator.      Assessment:      1. Tobacco abuse    2. Surgical menopause on hormone replacement therapy    3. Generalized anxiety disorder    4. Recurrent major depressive disorder, in partial remission           Plan:      Tobacco abuse    Surgical menopause on hormone replacement therapy  -     estradioL (ESTRACE) 2 MG tablet; TAKE ONE TABLET BY MOUTH ONCE DAILY **THANK YOU**  Dispense: 30 tablet; Refill: 5    Generalized anxiety disorder  -     clonazePAM (KLONOPIN) 1 MG tablet; Take 1 tablet (1 mg total) by mouth every evening.  Dispense: 30 tablet; Refill: 1  -     EScitalopram oxalate (LEXAPRO) 20 MG tablet; Take 1 tablet (20 mg total) by mouth once daily.  Dispense: 30 tablet; Refill: 5    Recurrent major depressive disorder, in partial remission  -     EScitalopram oxalate (LEXAPRO) 20 MG tablet; Take 1 tablet (20 mg total) by mouth once daily.  Dispense: 30 tablet; Refill: 5    Return to clinic in 6 months for follow-up     Rosita Tavares MD, FACOG   10/03/2022 1:51 PM

## 2022-10-19 ENCOUNTER — TELEPHONE (OUTPATIENT)
Dept: OBSTETRICS AND GYNECOLOGY | Facility: CLINIC | Age: 47
End: 2022-10-19
Payer: MEDICAID

## 2022-10-19 DIAGNOSIS — Z12.31 VISIT FOR SCREENING MAMMOGRAM: Primary | ICD-10-CM

## 2022-10-24 ENCOUNTER — HOSPITAL ENCOUNTER (OUTPATIENT)
Dept: RADIOLOGY | Facility: HOSPITAL | Age: 47
Discharge: HOME | End: 2022-10-24
Attending: OBSTETRICS & GYNECOLOGY
Payer: MEDICAID

## 2022-10-24 VITALS — HEIGHT: 63 IN | BODY MASS INDEX: 40.57 KG/M2 | WEIGHT: 229 LBS

## 2022-10-24 DIAGNOSIS — Z12.31 VISIT FOR SCREENING MAMMOGRAM: ICD-10-CM

## 2022-10-24 PROCEDURE — 77067 SCR MAMMO BI INCL CAD: CPT | Mod: TC

## 2022-10-24 PROCEDURE — 77063 BREAST TOMOSYNTHESIS BI: CPT | Mod: TC

## 2022-11-09 DIAGNOSIS — F41.1 GENERALIZED ANXIETY DISORDER: ICD-10-CM

## 2022-11-09 DIAGNOSIS — F33.41 RECURRENT MAJOR DEPRESSIVE DISORDER, IN PARTIAL REMISSION: ICD-10-CM

## 2022-11-09 DIAGNOSIS — Z79.890 SURGICAL MENOPAUSE ON HORMONE REPLACEMENT THERAPY: ICD-10-CM

## 2022-11-09 DIAGNOSIS — E89.40 SURGICAL MENOPAUSE ON HORMONE REPLACEMENT THERAPY: ICD-10-CM

## 2022-11-09 RX ORDER — ESCITALOPRAM OXALATE 20 MG/1
20 TABLET ORAL DAILY
Qty: 30 TABLET | Refills: 5 | Status: SHIPPED | OUTPATIENT
Start: 2022-11-09 | End: 2023-01-09 | Stop reason: SDUPTHER

## 2022-11-09 RX ORDER — ESTRADIOL 2 MG/1
TABLET ORAL
Qty: 30 TABLET | Refills: 5 | Status: SHIPPED | OUTPATIENT
Start: 2022-11-09 | End: 2023-01-09 | Stop reason: SDUPTHER

## 2022-11-09 RX ORDER — CLONAZEPAM 1 MG/1
1 TABLET ORAL NIGHTLY
Qty: 30 TABLET | Refills: 1 | Status: SHIPPED | OUTPATIENT
Start: 2022-11-09 | End: 2022-12-09 | Stop reason: SDUPTHER

## 2022-12-09 DIAGNOSIS — F41.1 GENERALIZED ANXIETY DISORDER: ICD-10-CM

## 2022-12-09 RX ORDER — CLONAZEPAM 1 MG/1
1 TABLET ORAL NIGHTLY
Qty: 30 TABLET | Refills: 1 | Status: SHIPPED | OUTPATIENT
Start: 2022-12-09 | End: 2023-01-09 | Stop reason: SDUPTHER

## 2023-01-06 ENCOUNTER — TELEPHONE (OUTPATIENT)
Dept: ORTHOPEDICS | Facility: CLINIC | Age: 48
End: 2023-01-06
Payer: MEDICAID

## 2023-01-06 DIAGNOSIS — F41.1 GENERALIZED ANXIETY DISORDER: ICD-10-CM

## 2023-01-06 DIAGNOSIS — F33.41 RECURRENT MAJOR DEPRESSIVE DISORDER, IN PARTIAL REMISSION: ICD-10-CM

## 2023-01-06 DIAGNOSIS — Z79.890 SURGICAL MENOPAUSE ON HORMONE REPLACEMENT THERAPY: ICD-10-CM

## 2023-01-06 DIAGNOSIS — E89.40 SURGICAL MENOPAUSE ON HORMONE REPLACEMENT THERAPY: ICD-10-CM

## 2023-01-06 NOTE — TELEPHONE ENCOUNTER
Patient called requesting refills on the following medications:     Lexapro 20mg  Estrace 2mg  Clonazepam 1mg

## 2023-01-09 RX ORDER — ESTRADIOL 2 MG/1
TABLET ORAL
Qty: 30 TABLET | Refills: 5 | Status: SHIPPED | OUTPATIENT
Start: 2023-01-09 | End: 2023-02-08 | Stop reason: SDUPTHER

## 2023-01-09 RX ORDER — CLONAZEPAM 1 MG/1
1 TABLET ORAL NIGHTLY
Qty: 30 TABLET | Refills: 1 | Status: SHIPPED | OUTPATIENT
Start: 2023-01-09 | End: 2023-02-08 | Stop reason: SDUPTHER

## 2023-01-09 RX ORDER — ESCITALOPRAM OXALATE 20 MG/1
20 TABLET ORAL DAILY
Qty: 30 TABLET | Refills: 5 | Status: SHIPPED | OUTPATIENT
Start: 2023-01-09 | End: 2023-02-08 | Stop reason: SDUPTHER

## 2023-02-08 ENCOUNTER — TELEPHONE (OUTPATIENT)
Dept: OBSTETRICS AND GYNECOLOGY | Facility: CLINIC | Age: 48
End: 2023-02-08
Payer: MEDICAID

## 2023-02-08 DIAGNOSIS — F33.41 RECURRENT MAJOR DEPRESSIVE DISORDER, IN PARTIAL REMISSION: ICD-10-CM

## 2023-02-08 DIAGNOSIS — E89.40 SURGICAL MENOPAUSE ON HORMONE REPLACEMENT THERAPY: ICD-10-CM

## 2023-02-08 DIAGNOSIS — F41.1 GENERALIZED ANXIETY DISORDER: ICD-10-CM

## 2023-02-08 DIAGNOSIS — Z79.890 SURGICAL MENOPAUSE ON HORMONE REPLACEMENT THERAPY: ICD-10-CM

## 2023-02-08 RX ORDER — ESTRADIOL 2 MG/1
TABLET ORAL
Qty: 30 TABLET | Refills: 5 | Status: SHIPPED | OUTPATIENT
Start: 2023-02-08 | End: 2023-03-09 | Stop reason: SDUPTHER

## 2023-02-08 RX ORDER — ESCITALOPRAM OXALATE 20 MG/1
20 TABLET ORAL DAILY
Qty: 30 TABLET | Refills: 5 | Status: SHIPPED | OUTPATIENT
Start: 2023-02-08 | End: 2023-03-09 | Stop reason: SDUPTHER

## 2023-02-08 RX ORDER — CLONAZEPAM 1 MG/1
1 TABLET ORAL NIGHTLY
Qty: 30 TABLET | Refills: 1 | Status: SHIPPED | OUTPATIENT
Start: 2023-02-08 | End: 2023-03-09 | Stop reason: SDUPTHER

## 2023-03-09 ENCOUNTER — TELEPHONE (OUTPATIENT)
Dept: OBSTETRICS AND GYNECOLOGY | Facility: CLINIC | Age: 48
End: 2023-03-09

## 2023-03-09 DIAGNOSIS — F33.41 RECURRENT MAJOR DEPRESSIVE DISORDER, IN PARTIAL REMISSION: ICD-10-CM

## 2023-03-09 DIAGNOSIS — E89.40 SURGICAL MENOPAUSE ON HORMONE REPLACEMENT THERAPY: ICD-10-CM

## 2023-03-09 DIAGNOSIS — F41.1 GENERALIZED ANXIETY DISORDER: ICD-10-CM

## 2023-03-09 DIAGNOSIS — Z79.890 SURGICAL MENOPAUSE ON HORMONE REPLACEMENT THERAPY: ICD-10-CM

## 2023-03-09 RX ORDER — ESCITALOPRAM OXALATE 20 MG/1
20 TABLET ORAL DAILY
Qty: 30 TABLET | Refills: 5 | Status: SHIPPED | OUTPATIENT
Start: 2023-03-09 | End: 2023-04-06 | Stop reason: SDUPTHER

## 2023-03-09 RX ORDER — CLONAZEPAM 1 MG/1
1 TABLET ORAL NIGHTLY
Qty: 30 TABLET | Refills: 1 | Status: SHIPPED | OUTPATIENT
Start: 2023-03-09 | End: 2023-04-06 | Stop reason: SDUPTHER

## 2023-03-09 RX ORDER — ESTRADIOL 2 MG/1
TABLET ORAL
Qty: 30 TABLET | Refills: 5 | Status: SHIPPED | OUTPATIENT
Start: 2023-03-09 | End: 2023-04-06 | Stop reason: SDUPTHER

## 2023-03-14 NOTE — PROGRESS NOTES
[facility]  General Surgery Clinic Progress Note    HPI:  Mena Mcdaniel is a 46 y.o. female with history of chronic constipation s/p colonoscopy on 10/6 who presents for follow up.  Reports daily soft BMs since increase water and fiber intake.  Also takes miralax BID.  Reports three day history of rectal bleeding and mild pain after BMs.  Has history of internal hemorrhoids.    Otherwise Denies CP, SOB, abdominal pain, nausea, vomiting, fevers, or chills.       ROS:  Negative except above    PE:  Vitals:    03/31/22 1014   BP: 127/78   Pulse: 72   Temp: 97.9 °F (36.6 °C)       Gen: Alert and oriented x3, judgement intact, NAD  CV: RRR  Resp: CTAB; breaths non-labored and symmetrical  Abd: Soft, NT, ND, BS+  Extremities: No c/c/e    A/P:  46 y.o. female with history of chronic constipation who presents for follow up  -Anusol BID for 14d  -Continue bowel regimen  -RTC 2 weeks     Rajwinder Mcdermott MD  General Surgery   [phone]        4/4/2022  10:24 AM       14-Mar-2023

## 2023-04-06 ENCOUNTER — TELEPHONE (OUTPATIENT)
Dept: OBSTETRICS AND GYNECOLOGY | Facility: CLINIC | Age: 48
End: 2023-04-06
Payer: MEDICAID

## 2023-04-06 DIAGNOSIS — E89.40 SURGICAL MENOPAUSE ON HORMONE REPLACEMENT THERAPY: ICD-10-CM

## 2023-04-06 DIAGNOSIS — F33.41 RECURRENT MAJOR DEPRESSIVE DISORDER, IN PARTIAL REMISSION: ICD-10-CM

## 2023-04-06 DIAGNOSIS — F41.1 GENERALIZED ANXIETY DISORDER: ICD-10-CM

## 2023-04-06 DIAGNOSIS — Z79.890 SURGICAL MENOPAUSE ON HORMONE REPLACEMENT THERAPY: ICD-10-CM

## 2023-04-06 RX ORDER — CLONAZEPAM 1 MG/1
1 TABLET ORAL NIGHTLY
Qty: 30 TABLET | Refills: 1 | Status: SHIPPED | OUTPATIENT
Start: 2023-04-06 | End: 2023-04-28 | Stop reason: SDUPTHER

## 2023-04-06 RX ORDER — ESTRADIOL 2 MG/1
TABLET ORAL
Qty: 30 TABLET | Refills: 5 | Status: SHIPPED | OUTPATIENT
Start: 2023-04-06 | End: 2023-04-28 | Stop reason: SDUPTHER

## 2023-04-06 RX ORDER — ESCITALOPRAM OXALATE 20 MG/1
20 TABLET ORAL DAILY
Qty: 30 TABLET | Refills: 5 | Status: SHIPPED | OUTPATIENT
Start: 2023-04-06 | End: 2023-04-28 | Stop reason: SDUPTHER

## 2023-04-28 ENCOUNTER — OFFICE VISIT (OUTPATIENT)
Dept: OBSTETRICS AND GYNECOLOGY | Facility: CLINIC | Age: 48
End: 2023-04-28
Payer: MEDICAID

## 2023-04-28 VITALS
WEIGHT: 232 LBS | SYSTOLIC BLOOD PRESSURE: 160 MMHG | DIASTOLIC BLOOD PRESSURE: 67 MMHG | BODY MASS INDEX: 41.11 KG/M2 | HEART RATE: 93 BPM | HEIGHT: 63 IN

## 2023-04-28 DIAGNOSIS — F33.41 RECURRENT MAJOR DEPRESSIVE DISORDER, IN PARTIAL REMISSION: ICD-10-CM

## 2023-04-28 DIAGNOSIS — F41.1 GENERALIZED ANXIETY DISORDER: ICD-10-CM

## 2023-04-28 DIAGNOSIS — E89.40 SURGICAL MENOPAUSE ON HORMONE REPLACEMENT THERAPY: Primary | ICD-10-CM

## 2023-04-28 DIAGNOSIS — Z78.0 MENOPAUSE: ICD-10-CM

## 2023-04-28 DIAGNOSIS — Z72.0 TOBACCO ABUSE: ICD-10-CM

## 2023-04-28 DIAGNOSIS — Z79.890 SURGICAL MENOPAUSE ON HORMONE REPLACEMENT THERAPY: Primary | ICD-10-CM

## 2023-04-28 PROCEDURE — 1159F MED LIST DOCD IN RCRD: CPT | Mod: CPTII,,, | Performed by: OBSTETRICS & GYNECOLOGY

## 2023-04-28 PROCEDURE — 99212 OFFICE O/P EST SF 10 MIN: CPT | Mod: PBBFAC | Performed by: OBSTETRICS & GYNECOLOGY

## 2023-04-28 PROCEDURE — 1159F PR MEDICATION LIST DOCUMENTED IN MEDICAL RECORD: ICD-10-PCS | Mod: CPTII,,, | Performed by: OBSTETRICS & GYNECOLOGY

## 2023-04-28 PROCEDURE — 3077F SYST BP >= 140 MM HG: CPT | Mod: CPTII,,, | Performed by: OBSTETRICS & GYNECOLOGY

## 2023-04-28 PROCEDURE — 99213 OFFICE O/P EST LOW 20 MIN: CPT | Mod: S$PBB,,, | Performed by: OBSTETRICS & GYNECOLOGY

## 2023-04-28 PROCEDURE — 3008F BODY MASS INDEX DOCD: CPT | Mod: CPTII,,, | Performed by: OBSTETRICS & GYNECOLOGY

## 2023-04-28 PROCEDURE — 3077F PR MOST RECENT SYSTOLIC BLOOD PRESSURE >= 140 MM HG: ICD-10-PCS | Mod: CPTII,,, | Performed by: OBSTETRICS & GYNECOLOGY

## 2023-04-28 PROCEDURE — 3008F PR BODY MASS INDEX (BMI) DOCUMENTED: ICD-10-PCS | Mod: CPTII,,, | Performed by: OBSTETRICS & GYNECOLOGY

## 2023-04-28 PROCEDURE — 99999 PR PBB SHADOW E&M-EST. PATIENT-LVL II: CPT | Mod: PBBFAC,,, | Performed by: OBSTETRICS & GYNECOLOGY

## 2023-04-28 PROCEDURE — 3078F DIAST BP <80 MM HG: CPT | Mod: CPTII,,, | Performed by: OBSTETRICS & GYNECOLOGY

## 2023-04-28 PROCEDURE — 99213 PR OFFICE/OUTPT VISIT, EST, LEVL III, 20-29 MIN: ICD-10-PCS | Mod: S$PBB,,, | Performed by: OBSTETRICS & GYNECOLOGY

## 2023-04-28 PROCEDURE — 99999 PR PBB SHADOW E&M-EST. PATIENT-LVL II: ICD-10-PCS | Mod: PBBFAC,,, | Performed by: OBSTETRICS & GYNECOLOGY

## 2023-04-28 PROCEDURE — 3078F PR MOST RECENT DIASTOLIC BLOOD PRESSURE < 80 MM HG: ICD-10-PCS | Mod: CPTII,,, | Performed by: OBSTETRICS & GYNECOLOGY

## 2023-04-28 RX ORDER — ESTRADIOL 2 MG/1
TABLET ORAL
Qty: 30 TABLET | Refills: 5 | Status: SHIPPED | OUTPATIENT
Start: 2023-04-28 | End: 2024-03-11 | Stop reason: SDUPTHER

## 2023-04-28 RX ORDER — CLONAZEPAM 1 MG/1
1 TABLET ORAL NIGHTLY
Qty: 30 TABLET | Refills: 1 | Status: SHIPPED | OUTPATIENT
Start: 2023-04-28 | End: 2023-05-04

## 2023-04-28 RX ORDER — ESCITALOPRAM OXALATE 20 MG/1
20 TABLET ORAL DAILY
Qty: 30 TABLET | Refills: 5 | Status: SHIPPED | OUTPATIENT
Start: 2023-04-28 | End: 2024-03-11 | Stop reason: SDUPTHER

## 2023-04-28 NOTE — PROGRESS NOTES
Subjective:    Patient ID: Mena Mcdaniel is a 48 y.o. y.o. female    Chief Complaint:   Chief Complaint   Patient presents with    Follow-up     Pt here for med f/u states she has no c/o and feels its working fine. Needs refills of estradiol. Lexapro, and klonopin       History of Present Illness:  Mena presents today for follow up of depression, anxiety and hrt. She is doing well on the current medication regimen and would like to continue. Menopausal symptoms are under control. She reports that she is less anxious lately      Review of Systems   Constitutional:  Negative for chills, fatigue and fever.   Respiratory:  Negative for shortness of breath.    Cardiovascular:  Negative for chest pain.   Gastrointestinal:  Negative for abdominal pain, constipation, diarrhea and nausea.   Genitourinary:  Positive for hot flashes (controlled with estradiol). Negative for bladder incontinence, dysuria, pelvic pain and vaginal bleeding.   Neurological:  Negative for headaches.   Psychiatric/Behavioral:  Positive for depression (stable). The patient is nervous/anxious (stable).        Objective:    Vital Signs:  Vitals:    04/28/23 1058   BP: (!) 160/67   Pulse: 93     Wt Readings from Last 1 Encounters:   04/28/23 105.2 kg (232 lb)     Body mass index is 41.1 kg/m².    Physical Exam:  General:  alert, no distress   Skin:  Skin color, texture, turgor normal. No rashes or lesions   Abdomen:  Soft, nontender   Extremities: No cyanosis, clubbing, edema       Pt desires to continue same regimen. Reassurance given and all questions answered.    I spent a total of 20 minutes on the day of the visit.  This includes face to face time and non-face to face time preparing to see the patient (eg, review of tests), obtaining and/or reviewing separately obtained history, documenting clinical information in the electronic or other health record, independently interpreting results and communicating results to the  patient/family/caregiver, or care coordinator.       Assessment:      1. Surgical menopause on hormone replacement therapy    2. Generalized anxiety disorder    3. Recurrent major depressive disorder, in partial remission    4. Tobacco abuse    5. Menopause          Plan:      Surgical menopause on hormone replacement therapy  -     estradioL (ESTRACE) 2 MG tablet; TAKE ONE TABLET BY MOUTH ONCE DAILY **THANK YOU**  Dispense: 30 tablet; Refill: 5    Generalized anxiety disorder  -     clonazePAM (KLONOPIN) 1 MG tablet; Take 1 tablet (1 mg total) by mouth every evening.  Dispense: 30 tablet; Refill: 1  -     EScitalopram oxalate (LEXAPRO) 20 MG tablet; Take 1 tablet (20 mg total) by mouth once daily.  Dispense: 30 tablet; Refill: 5    Recurrent major depressive disorder, in partial remission  -     EScitalopram oxalate (LEXAPRO) 20 MG tablet; Take 1 tablet (20 mg total) by mouth once daily.  Dispense: 30 tablet; Refill: 5    Tobacco abuse    Menopause           Rosita Tavares MD, FACOG   04/28/2023 11:25 AM

## 2023-05-04 DIAGNOSIS — F41.1 GENERALIZED ANXIETY DISORDER: ICD-10-CM

## 2023-05-04 RX ORDER — CLONAZEPAM 1 MG/1
TABLET ORAL
Qty: 30 TABLET | Refills: 1 | Status: SHIPPED | OUTPATIENT
Start: 2023-05-04 | End: 2023-06-29

## 2023-05-10 ENCOUNTER — OFFICE VISIT (OUTPATIENT)
Dept: OBSTETRICS AND GYNECOLOGY | Facility: CLINIC | Age: 48
End: 2023-05-10
Payer: MEDICAID

## 2023-05-10 VITALS
HEART RATE: 69 BPM | BODY MASS INDEX: 40.92 KG/M2 | SYSTOLIC BLOOD PRESSURE: 128 MMHG | DIASTOLIC BLOOD PRESSURE: 72 MMHG | WEIGHT: 231 LBS

## 2023-05-10 DIAGNOSIS — E89.40 SURGICAL MENOPAUSE ON HORMONE REPLACEMENT THERAPY: ICD-10-CM

## 2023-05-10 DIAGNOSIS — Z01.419 ROUTINE GYNECOLOGICAL EXAMINATION: Primary | ICD-10-CM

## 2023-05-10 DIAGNOSIS — F41.1 GENERALIZED ANXIETY DISORDER: ICD-10-CM

## 2023-05-10 DIAGNOSIS — Z78.0 MENOPAUSE: ICD-10-CM

## 2023-05-10 DIAGNOSIS — Z72.0 TOBACCO ABUSE: ICD-10-CM

## 2023-05-10 DIAGNOSIS — Z90.710 HISTORY OF HYSTERECTOMY: ICD-10-CM

## 2023-05-10 DIAGNOSIS — Z79.890 SURGICAL MENOPAUSE ON HORMONE REPLACEMENT THERAPY: ICD-10-CM

## 2023-05-10 PROCEDURE — 3008F BODY MASS INDEX DOCD: CPT | Mod: CPTII,,, | Performed by: OBSTETRICS & GYNECOLOGY

## 2023-05-10 PROCEDURE — 3078F PR MOST RECENT DIASTOLIC BLOOD PRESSURE < 80 MM HG: ICD-10-PCS | Mod: CPTII,,, | Performed by: OBSTETRICS & GYNECOLOGY

## 2023-05-10 PROCEDURE — 3074F SYST BP LT 130 MM HG: CPT | Mod: CPTII,,, | Performed by: OBSTETRICS & GYNECOLOGY

## 2023-05-10 PROCEDURE — 3074F PR MOST RECENT SYSTOLIC BLOOD PRESSURE < 130 MM HG: ICD-10-PCS | Mod: CPTII,,, | Performed by: OBSTETRICS & GYNECOLOGY

## 2023-05-10 PROCEDURE — 3008F PR BODY MASS INDEX (BMI) DOCUMENTED: ICD-10-PCS | Mod: CPTII,,, | Performed by: OBSTETRICS & GYNECOLOGY

## 2023-05-10 PROCEDURE — 99999 PR PBB SHADOW E&M-EST. PATIENT-LVL II: CPT | Mod: PBBFAC,,, | Performed by: OBSTETRICS & GYNECOLOGY

## 2023-05-10 PROCEDURE — 99396 PREV VISIT EST AGE 40-64: CPT | Mod: S$PBB,,, | Performed by: OBSTETRICS & GYNECOLOGY

## 2023-05-10 PROCEDURE — 99999 PR PBB SHADOW E&M-EST. PATIENT-LVL II: ICD-10-PCS | Mod: PBBFAC,,, | Performed by: OBSTETRICS & GYNECOLOGY

## 2023-05-10 PROCEDURE — 99396 PR PREVENTIVE VISIT,EST,40-64: ICD-10-PCS | Mod: S$PBB,,, | Performed by: OBSTETRICS & GYNECOLOGY

## 2023-05-10 PROCEDURE — 99212 OFFICE O/P EST SF 10 MIN: CPT | Mod: PBBFAC | Performed by: OBSTETRICS & GYNECOLOGY

## 2023-05-10 PROCEDURE — 3078F DIAST BP <80 MM HG: CPT | Mod: CPTII,,, | Performed by: OBSTETRICS & GYNECOLOGY

## 2023-05-10 NOTE — PROGRESS NOTES
Subjective:    Patient ID: Mena Mcdaniel is a 48 y.o. female.     Chief Complaint: Annual Well Woman Exam     History of Present Illness:  Mena presents today for Annual Well Woman exam. .Patient's last menstrual period was 2012.. She is currently using Estradiol 2 mg and she reports no problems with hot flashes, night sweats, irritability or vaginal dryness. She denies breast tenderness, denies masses, denies nipple discharge. She denies difficulty with urination. Bowel movements have not significantly changed.       Menstrual History:   Patient's last menstrual period was 2012..     OB History          5    Para   2    Term   2            AB   3    Living   2         SAB        IAB        Ectopic        Multiple        Live Births                     Review of Systems      Objective:    Vital Signs:  Vitals:    05/10/23 1134   BP: 128/72   Pulse: 69       Physical Exam:  General:  alert, no distress   Skin:  Skin color, texture, turgor normal. No rashes or lesions   HEENT:  conjunctivae/corneas clear. PERRL.   Neck: supple, trachea midline, no adenopathy or thyromegaly   Respiratory:  clear to auscultation bilaterally   Heart:  regular rate and rhythm, S1, S2 normal, no murmur, click, rub or gallop   Breasts: Left Breast: Nipple protruding. No nipple discharge. No palpable masses, erythema, skin changes, tenderness, or adenopathy.  Right Breast: Nipple protruding. No nipple discharge. No palpable masses, erythema, skin changes, tenderness, or adenopathy.   Abdomen:  Soft, non-tender. Bowel sounds normal. No masses,  no organomegaly   Pelvis: External genitalia: normal general appearance  Urinary system: urethral meatus normal, bladder nontender  Vaginal: normal mucosa without prolapse or lesions  Cervix: removed surgically  Uterus: removed surgically  Adnexa: removed surgically   Extremities: Normal ROM; no edema, no cyanosis   Neurological: Normal strength and tone. No focal  numbness or weakness. Reflexes 2+ and equal.   Psychiatric: normal mood, speech, dress, and thought processes       Patient doing well on current medical regimen and would like to continue.  All questions answered      Assessment:      1. Routine gynecological examination    2. Surgical menopause on hormone replacement therapy    3. Generalized anxiety disorder    4. Tobacco abuse    5. Menopause    6. History of hysterectomy          Plan:      Routine gynecological examination    Surgical menopause on hormone replacement therapy    Generalized anxiety disorder    Tobacco abuse    Menopause    History of hysterectomy        Health Maintenance and Screening:   Mena was counseled on A.C.O.G. Pap guidelines and recommendations for yearly pelvic exams in addition to recommendations for yearly mammograms and monthly self breast exams, and adequate calcium and vitamin D in her diet.     A discussion of needed Health Maintenance Screening was done with Mena. She was counseled that she is overdue for Mammogram: She will schedule: later.    Rosita Tavares MD FACOG    05/10/2023  11:51 AM

## 2023-06-29 DIAGNOSIS — F41.1 GENERALIZED ANXIETY DISORDER: ICD-10-CM

## 2023-06-29 RX ORDER — CLONAZEPAM 1 MG/1
TABLET ORAL
Qty: 30 TABLET | Refills: 1 | Status: SHIPPED | OUTPATIENT
Start: 2023-06-29 | End: 2023-08-03 | Stop reason: SDUPTHER

## 2023-08-03 ENCOUNTER — TELEPHONE (OUTPATIENT)
Dept: OBSTETRICS AND GYNECOLOGY | Facility: CLINIC | Age: 48
End: 2023-08-03
Payer: MEDICAID

## 2023-08-03 ENCOUNTER — HOSPITAL ENCOUNTER (OUTPATIENT)
Dept: RADIOLOGY | Facility: HOSPITAL | Age: 48
Discharge: HOME | End: 2023-08-03
Attending: INTERNAL MEDICINE
Payer: MEDICAID

## 2023-08-03 DIAGNOSIS — M54.9 DORSALGIA: Primary | ICD-10-CM

## 2023-08-03 DIAGNOSIS — M54.9 DORSALGIA: ICD-10-CM

## 2023-08-03 DIAGNOSIS — F41.1 GENERALIZED ANXIETY DISORDER: ICD-10-CM

## 2023-08-03 PROCEDURE — 72110 X-RAY EXAM L-2 SPINE 4/>VWS: CPT | Mod: TC

## 2023-08-03 RX ORDER — CLONAZEPAM 1 MG/1
1 TABLET ORAL 2 TIMES DAILY PRN
Qty: 60 TABLET | Refills: 1 | Status: SHIPPED | OUTPATIENT
Start: 2023-08-03 | End: 2023-09-07

## 2023-09-06 DIAGNOSIS — F41.1 GENERALIZED ANXIETY DISORDER: ICD-10-CM

## 2023-09-07 RX ORDER — CLONAZEPAM 1 MG/1
TABLET ORAL
Qty: 30 TABLET | Refills: 1 | Status: SHIPPED | OUTPATIENT
Start: 2023-09-07 | End: 2023-10-05 | Stop reason: SDUPTHER

## 2023-09-13 ENCOUNTER — HOSPITAL ENCOUNTER (EMERGENCY)
Facility: HOSPITAL | Age: 48
Discharge: HOME | End: 2023-09-13
Attending: EMERGENCY MEDICINE
Payer: MEDICAID

## 2023-09-13 VITALS
SYSTOLIC BLOOD PRESSURE: 132 MMHG | RESPIRATION RATE: 18 BRPM | BODY MASS INDEX: 40.04 KG/M2 | HEART RATE: 82 BPM | WEIGHT: 226 LBS | TEMPERATURE: 99 F | OXYGEN SATURATION: 97 % | HEIGHT: 63 IN | DIASTOLIC BLOOD PRESSURE: 63 MMHG

## 2023-09-13 DIAGNOSIS — L03.311 CELLULITIS OF ABDOMINAL WALL: Primary | ICD-10-CM

## 2023-09-13 PROCEDURE — 25000003 PHARM REV CODE 250: Performed by: CLINICAL NURSE SPECIALIST

## 2023-09-13 PROCEDURE — 99284 EMERGENCY DEPT VISIT MOD MDM: CPT

## 2023-09-13 RX ORDER — SULFAMETHOXAZOLE AND TRIMETHOPRIM 800; 160 MG/1; MG/1
1 TABLET ORAL ONCE
Status: COMPLETED | OUTPATIENT
Start: 2023-09-13 | End: 2023-09-13

## 2023-09-13 RX ORDER — SULFAMETHOXAZOLE AND TRIMETHOPRIM 800; 160 MG/1; MG/1
1 TABLET ORAL 2 TIMES DAILY
Qty: 14 TABLET | Refills: 0 | Status: SHIPPED | OUTPATIENT
Start: 2023-09-13 | End: 2023-09-20

## 2023-09-13 RX ORDER — DICLOFENAC SODIUM 75 MG/1
75 TABLET, DELAYED RELEASE ORAL 2 TIMES DAILY
Qty: 20 TABLET | Refills: 0 | Status: ON HOLD | OUTPATIENT
Start: 2023-09-13 | End: 2023-12-06 | Stop reason: CLARIF

## 2023-09-13 RX ORDER — MUPIROCIN 20 MG/G
OINTMENT TOPICAL 3 TIMES DAILY
Qty: 15 G | Refills: 0 | Status: ON HOLD | OUTPATIENT
Start: 2023-09-13 | End: 2023-12-06 | Stop reason: CLARIF

## 2023-09-13 RX ADMIN — SULFAMETHOXAZOLE AND TRIMETHOPRIM 1 TABLET: 800; 160 TABLET ORAL at 07:09

## 2023-09-13 NOTE — ED PROVIDER NOTES
Encounter Date: 9/13/2023       History     Chief Complaint   Patient presents with    Cellulitis     Pt stated that for the past couple days has been experiencing hardened/reddened area to abdomen. Denied fever / nausea / vomiting.      48-year-old female presents to the emergency room with redness, swelling, harden area to right side of abdomen for the last few days.  Patient is requesting us to open up area but no fluctuation noted.        Review of patient's allergies indicates:   Allergen Reactions    Ceclor [cefaclor] Hives    Clindamycin Hives    Erythromycin Hives    Pcn [penicillins] Hives     No longer allergic    Tramadol Hives     Past Medical History:   Diagnosis Date    Chronic constipation     Depression     Family history of colon cancer     Menopause     Wears dentures     FULL     Past Surgical History:   Procedure Laterality Date    COLD KNIFE CONIZATION OF CERVIX      HYSTERECTOMY      OOPHORECTOMY      TONSILLECTOMY      TONSILLECTOMY       Family History   Problem Relation Age of Onset    Heart failure Mother     Hypertension Father     No Known Problems Sister     No Known Problems Daughter     No Known Problems Maternal Aunt     No Known Problems Maternal Uncle     No Known Problems Paternal Aunt     No Known Problems Paternal Uncle     No Known Problems Maternal Grandmother     No Known Problems Maternal Grandfather     No Known Problems Paternal Grandmother     No Known Problems Paternal Grandfather     Breast cancer Neg Hx     Ovarian cancer Neg Hx     BRCA 1/2 Neg Hx      Social History     Tobacco Use    Smoking status: Every Day     Current packs/day: 1.00     Average packs/day: 1 pack/day for 14.0 years (14.0 ttl pk-yrs)     Types: Cigarettes    Smokeless tobacco: Never   Substance Use Topics    Alcohol use: Not Currently     Alcohol/week: 0.0 standard drinks of alcohol    Drug use: Never     Review of Systems   Constitutional:  Negative for fever.   HENT:  Negative for sore throat.     Respiratory:  Negative for shortness of breath.    Cardiovascular:  Negative for chest pain.   Gastrointestinal:  Negative for nausea.   Genitourinary:  Negative for dysuria.   Musculoskeletal:  Negative for back pain.   Skin:  Positive for color change. Negative for rash.   Neurological:  Negative for weakness.   Hematological:  Does not bruise/bleed easily.   All other systems reviewed and are negative.      Physical Exam     Initial Vitals [09/13/23 1849]   BP Pulse Resp Temp SpO2   132/63 82 18 98.8 °F (37.1 °C) 97 %      MAP       --         Physical Exam    Nursing note and vitals reviewed.  Constitutional: She appears well-developed and well-nourished.   HENT:   Head: Normocephalic and atraumatic.   Eyes: Pupils are equal, round, and reactive to light.   Neck:   Normal range of motion.  Musculoskeletal:         General: Normal range of motion.      Cervical back: Normal range of motion.     Neurological: She is alert and oriented to person, place, and time.   Skin: Skin is dry. There is erythema.   Psychiatric: She has a normal mood and affect.         ED Course   Procedures  Labs Reviewed - No data to display       Imaging Results    None          Medications   sulfamethoxazole-trimethoprim 800-160mg per tablet 1 tablet (1 tablet Oral Given 9/13/23 1900)     Medical Decision Making  Risk  Prescription drug management.                               Clinical Impression:   Final diagnoses:  [L03.311] Cellulitis of abdominal wall (Primary)        ED Disposition Condition    Discharge Stable          ED Prescriptions       Medication Sig Dispense Start Date End Date Auth. Provider    sulfamethoxazole-trimethoprim 800-160mg (BACTRIM DS) 800-160 mg Tab Take 1 tablet by mouth 2 (two) times daily. for 7 days 14 tablet 9/13/2023 9/20/2023 Iraida Dyer, NP    mupirocin (BACTROBAN) 2 % ointment Apply topically 3 (three) times daily. 15 g 9/13/2023 -- Iraida Dyer NP    diclofenac (VOLTAREN) 75 MG EC  tablet Take 1 tablet (75 mg total) by mouth 2 (two) times daily. 20 tablet 9/13/2023 -- Iraida Dyer, SAM          Follow-up Information       Follow up With Specialties Details Why Contact Info    Viola Duncan, NP Nurse Practitioner  As needed [address] [facility] [address]  [phone]               Iraida Dyer NP  09/13/23 1940

## 2023-09-20 ENCOUNTER — HOSPITAL ENCOUNTER (EMERGENCY)
Facility: HOSPITAL | Age: 48
Discharge: HOME | End: 2023-09-20
Attending: EMERGENCY MEDICINE
Payer: MEDICAID

## 2023-09-20 VITALS
OXYGEN SATURATION: 98 % | TEMPERATURE: 98 F | SYSTOLIC BLOOD PRESSURE: 120 MMHG | RESPIRATION RATE: 18 BRPM | WEIGHT: 223 LBS | HEART RATE: 87 BPM | BODY MASS INDEX: 39.5 KG/M2 | DIASTOLIC BLOOD PRESSURE: 78 MMHG

## 2023-09-20 DIAGNOSIS — U07.1 COVID: Primary | ICD-10-CM

## 2023-09-20 LAB
CTP QC/QA: YES
SARS-COV-2 RDRP RESP QL NAA+PROBE: POSITIVE

## 2023-09-20 PROCEDURE — 99284 EMERGENCY DEPT VISIT MOD MDM: CPT

## 2023-09-20 PROCEDURE — 87635 SARS-COV-2 COVID-19 AMP PRB: CPT

## 2023-09-20 RX ORDER — CETIRIZINE HYDROCHLORIDE 10 MG/1
10 TABLET ORAL DAILY
Qty: 30 TABLET | Refills: 0 | Status: SHIPPED | OUTPATIENT
Start: 2023-09-20 | End: 2024-09-19

## 2023-09-20 RX ORDER — FLUTICASONE PROPIONATE 50 MCG
1 SPRAY, SUSPENSION (ML) NASAL 2 TIMES DAILY PRN
Qty: 15 G | Refills: 0 | Status: ON HOLD | OUTPATIENT
Start: 2023-09-20 | End: 2023-12-06 | Stop reason: CLARIF

## 2023-09-20 NOTE — Clinical Note
"Mena"Araceli Mcdaniel was seen and treated in our emergency department on 9/20/2023.  She may return to work on 09/25/2023.  Per CDC guidelines, you do not need to have a negative test to return to work.  You must be fever free for 24 hours without the use of medication before you break quarantine.     If you have any questions or concerns, please don't hesitate to call.      Armando York, NP"

## 2023-09-21 NOTE — ED PROVIDER NOTES
Encounter Date: 9/20/2023       History     Chief Complaint   Patient presents with    COVID-19 Concerns     Asymptomatic with covid exposure.     48-year-old female to ED for COVID testing.  Reports close contact with multiple family members.  Denies any symptoms.  Patient has no complaints.    The history is provided by the patient.     Review of patient's allergies indicates:   Allergen Reactions    Bactrim [sulfamethoxazole-trimethoprim]     Ceclor [cefaclor] Hives    Clindamycin Hives    Erythromycin Hives    Pcn [penicillins] Hives     No longer allergic    Tramadol Hives     Past Medical History:   Diagnosis Date    Chronic constipation     Depression     Family history of colon cancer     Menopause     Wears dentures     FULL     Past Surgical History:   Procedure Laterality Date    COLD KNIFE CONIZATION OF CERVIX      HYSTERECTOMY      OOPHORECTOMY      TONSILLECTOMY      TONSILLECTOMY       Family History   Problem Relation Age of Onset    Heart failure Mother     Hypertension Father     No Known Problems Sister     No Known Problems Daughter     No Known Problems Maternal Aunt     No Known Problems Maternal Uncle     No Known Problems Paternal Aunt     No Known Problems Paternal Uncle     No Known Problems Maternal Grandmother     No Known Problems Maternal Grandfather     No Known Problems Paternal Grandmother     No Known Problems Paternal Grandfather     Breast cancer Neg Hx     Ovarian cancer Neg Hx     BRCA 1/2 Neg Hx      Social History     Tobacco Use    Smoking status: Every Day     Current packs/day: 1.00     Average packs/day: 1 pack/day for 14.0 years (14.0 ttl pk-yrs)     Types: Cigarettes    Smokeless tobacco: Never   Substance Use Topics    Alcohol use: Not Currently     Alcohol/week: 0.0 standard drinks of alcohol    Drug use: Never     Review of Systems   Constitutional:  Negative for fever.   HENT:  Negative for sore throat.    Eyes: Negative.    Respiratory:  Negative for shortness of  breath.    Cardiovascular:  Negative for chest pain.   Gastrointestinal:  Negative for nausea.   Endocrine: Negative.    Genitourinary:  Negative for dysuria.   Musculoskeletal:  Negative for back pain.   Skin:  Negative for rash.   Allergic/Immunologic: Negative.    Neurological:  Negative for weakness.   Hematological:  Does not bruise/bleed easily.   Psychiatric/Behavioral: Negative.         Physical Exam     Initial Vitals [09/20/23 2002]   BP Pulse Resp Temp SpO2   120/78 87 18 98.4 °F (36.9 °C) 98 %      MAP       --         Physical Exam    Nursing note and vitals reviewed.  Constitutional: She appears well-developed and well-nourished.   HENT:   Head: Normocephalic and atraumatic.   Eyes: EOM are normal.   Neck: Neck supple.   Normal range of motion.  Cardiovascular:  Normal rate and regular rhythm.           Pulmonary/Chest: Breath sounds normal. No respiratory distress. She has no wheezes.   Abdominal: She exhibits no distension.   Musculoskeletal:         General: Normal range of motion.      Cervical back: Normal range of motion and neck supple.     Neurological: She is alert and oriented to person, place, and time.   Skin: Skin is warm and dry.   Psychiatric: Thought content normal.         ED Course   Procedures  Labs Reviewed   SARS-COV-2 RDRP GENE - Abnormal; Notable for the following components:       Result Value    POC Rapid COVID Positive (*)     All other components within normal limits          Imaging Results    None          Medications - No data to display  Medical Decision Making  48-year-old female to ED for COVID testing after close contact with multiple family members that tested positive for COVID.  Patient was no complaints.  She was nontoxic-appearing.  She was not ill-appearing.  Vitals were stable.  She was not hypoxic.  No signs of respiratory distress noted.  COVID was positive.  Will treat her symptomatically with Flonase and Zyrtec.  She was given CDC guidelines for quarantine.   Strict return precautions given.  She was stable for discharge.    Amount and/or Complexity of Data Reviewed  Labs: ordered.    Risk  OTC drugs.                               Clinical Impression:   Final diagnoses:  [U07.1] COVID (Primary)        ED Disposition Condition    Discharge Stable          ED Prescriptions       Medication Sig Dispense Start Date End Date Auth. Provider    cetirizine (ZYRTEC) 10 MG tablet Take 1 tablet (10 mg total) by mouth once daily. 30 tablet 9/20/2023 9/19/2024 Armando York NP    fluticasone propionate (FLONASE) 50 mcg/actuation nasal spray 1 spray (50 mcg total) by Each Nostril route 2 (two) times daily as needed. 15 g 9/20/2023 -- Armando York NP          Follow-up Information       Follow up With Specialties Details Why Contact Info    Viola Duncan NP Nurse Practitioner   [address] [facility] [address]  [phone]               Armando York NP  09/20/23 1713

## 2023-10-05 ENCOUNTER — TELEPHONE (OUTPATIENT)
Dept: OBSTETRICS AND GYNECOLOGY | Facility: CLINIC | Age: 48
End: 2023-10-05
Payer: MEDICAID

## 2023-10-05 DIAGNOSIS — F41.1 GENERALIZED ANXIETY DISORDER: ICD-10-CM

## 2023-10-05 RX ORDER — CLONAZEPAM 1 MG/1
TABLET ORAL
Qty: 30 TABLET | Refills: 1 | Status: SHIPPED | OUTPATIENT
Start: 2023-10-05 | End: 2023-11-06 | Stop reason: SDUPTHER

## 2023-11-03 ENCOUNTER — TELEPHONE (OUTPATIENT)
Dept: OBSTETRICS AND GYNECOLOGY | Facility: CLINIC | Age: 48
End: 2023-11-03
Payer: MEDICAID

## 2023-11-03 DIAGNOSIS — F41.1 GENERALIZED ANXIETY DISORDER: ICD-10-CM

## 2023-11-03 NOTE — TELEPHONE ENCOUNTER
Needs refills of klonopin states its supposed to be twice a day one in the morning and one hs. Pharmacy is [facility].

## 2023-11-06 RX ORDER — CLONAZEPAM 1 MG/1
1 TABLET ORAL 2 TIMES DAILY
Qty: 60 TABLET | Refills: 1 | Status: SHIPPED | OUTPATIENT
Start: 2023-11-06 | End: 2024-01-02

## 2023-11-13 ENCOUNTER — OFFICE VISIT (OUTPATIENT)
Dept: SURGERY | Facility: CLINIC | Age: 48
End: 2023-11-13
Payer: MEDICAID

## 2023-11-13 VITALS
OXYGEN SATURATION: 96 % | HEART RATE: 97 BPM | DIASTOLIC BLOOD PRESSURE: 79 MMHG | BODY MASS INDEX: 39.99 KG/M2 | SYSTOLIC BLOOD PRESSURE: 140 MMHG | WEIGHT: 225.75 LBS

## 2023-11-13 DIAGNOSIS — Z01.818 PRE-OP EXAM: Primary | ICD-10-CM

## 2023-11-13 DIAGNOSIS — R22.2 MASS OF BUTTOCK: ICD-10-CM

## 2023-11-13 PROCEDURE — 1159F PR MEDICATION LIST DOCUMENTED IN MEDICAL RECORD: ICD-10-PCS | Mod: CPTII,,, | Performed by: STUDENT IN AN ORGANIZED HEALTH CARE EDUCATION/TRAINING PROGRAM

## 2023-11-13 PROCEDURE — 3008F PR BODY MASS INDEX (BMI) DOCUMENTED: ICD-10-PCS | Mod: CPTII,,, | Performed by: STUDENT IN AN ORGANIZED HEALTH CARE EDUCATION/TRAINING PROGRAM

## 2023-11-13 PROCEDURE — 99214 PR OFFICE/OUTPT VISIT, EST, LEVL IV, 30-39 MIN: ICD-10-PCS | Mod: S$PBB,,, | Performed by: STUDENT IN AN ORGANIZED HEALTH CARE EDUCATION/TRAINING PROGRAM

## 2023-11-13 PROCEDURE — 99214 OFFICE O/P EST MOD 30 MIN: CPT | Mod: PBBFAC | Performed by: STUDENT IN AN ORGANIZED HEALTH CARE EDUCATION/TRAINING PROGRAM

## 2023-11-13 PROCEDURE — 3077F SYST BP >= 140 MM HG: CPT | Mod: CPTII,,, | Performed by: STUDENT IN AN ORGANIZED HEALTH CARE EDUCATION/TRAINING PROGRAM

## 2023-11-13 PROCEDURE — 3077F PR MOST RECENT SYSTOLIC BLOOD PRESSURE >= 140 MM HG: ICD-10-PCS | Mod: CPTII,,, | Performed by: STUDENT IN AN ORGANIZED HEALTH CARE EDUCATION/TRAINING PROGRAM

## 2023-11-13 PROCEDURE — 99214 OFFICE O/P EST MOD 30 MIN: CPT | Mod: S$PBB,,, | Performed by: STUDENT IN AN ORGANIZED HEALTH CARE EDUCATION/TRAINING PROGRAM

## 2023-11-13 PROCEDURE — 3008F BODY MASS INDEX DOCD: CPT | Mod: CPTII,,, | Performed by: STUDENT IN AN ORGANIZED HEALTH CARE EDUCATION/TRAINING PROGRAM

## 2023-11-13 PROCEDURE — 1159F MED LIST DOCD IN RCRD: CPT | Mod: CPTII,,, | Performed by: STUDENT IN AN ORGANIZED HEALTH CARE EDUCATION/TRAINING PROGRAM

## 2023-11-13 PROCEDURE — 3078F PR MOST RECENT DIASTOLIC BLOOD PRESSURE < 80 MM HG: ICD-10-PCS | Mod: CPTII,,, | Performed by: STUDENT IN AN ORGANIZED HEALTH CARE EDUCATION/TRAINING PROGRAM

## 2023-11-13 PROCEDURE — 99999 PR PBB SHADOW E&M-EST. PATIENT-LVL IV: CPT | Mod: PBBFAC,,, | Performed by: STUDENT IN AN ORGANIZED HEALTH CARE EDUCATION/TRAINING PROGRAM

## 2023-11-13 PROCEDURE — 3078F DIAST BP <80 MM HG: CPT | Mod: CPTII,,, | Performed by: STUDENT IN AN ORGANIZED HEALTH CARE EDUCATION/TRAINING PROGRAM

## 2023-11-13 PROCEDURE — 99999 PR PBB SHADOW E&M-EST. PATIENT-LVL IV: ICD-10-PCS | Mod: PBBFAC,,, | Performed by: STUDENT IN AN ORGANIZED HEALTH CARE EDUCATION/TRAINING PROGRAM

## 2023-11-13 RX ORDER — SODIUM CHLORIDE 9 MG/ML
INJECTION, SOLUTION INTRAVENOUS CONTINUOUS
Status: CANCELLED | OUTPATIENT
Start: 2023-11-13

## 2023-11-13 RX ORDER — ONDANSETRON 2 MG/ML
4 INJECTION INTRAMUSCULAR; INTRAVENOUS EVERY 12 HOURS PRN
Status: CANCELLED | OUTPATIENT
Start: 2023-11-13

## 2023-11-13 RX ORDER — METOCLOPRAMIDE HYDROCHLORIDE 5 MG/ML
5 INJECTION INTRAMUSCULAR; INTRAVENOUS EVERY 6 HOURS PRN
Status: CANCELLED | OUTPATIENT
Start: 2023-11-13

## 2023-11-13 NOTE — H&P (VIEW-ONLY)
[facility] H&P      Consult: mass of the L buttock   Consulting Service: SAM Duncan   Chief Complaint: L buttock pain    HPI: Pt is a 48 y.o. female who presents with subcutaneous nodule of the L buttock.  Has noticed nodule for about a week.  Reports radiating pain down the LLE and up the back.  Voices no other nodules of masses on the body.     PMH:   Past Medical History:   Diagnosis Date    Chronic constipation     Depression     Family history of colon cancer     Menopause     Wears dentures     FULL     PSH:   Past Surgical History:   Procedure Laterality Date    COLD KNIFE CONIZATION OF CERVIX      HYSTERECTOMY      OOPHORECTOMY      TONSILLECTOMY      TONSILLECTOMY       Meds: See medication list;  No ASA or anticoagulation   ALL: Bactrim [sulfamethoxazole-trimethoprim], Ceclor [cefaclor], Clindamycin, Erythromycin, Pcn [penicillins], and Tramadol  FHX: non contributory   SOC:   Social History     Socioeconomic History    Marital status: Single   Tobacco Use    Smoking status: Every Day     Current packs/day: 1.00     Average packs/day: 1 pack/day for 14.0 years (14.0 ttl pk-yrs)     Types: Cigarettes    Smokeless tobacco: Never   Substance and Sexual Activity    Alcohol use: Not Currently     Alcohol/week: 0.0 standard drinks of alcohol    Drug use: Never    Sexual activity: Not Currently     Comment:      ROS: Review of Systems   Constitutional:  Negative for chills, fever, malaise/fatigue and weight loss.   Respiratory:  Negative for cough.    Cardiovascular:  Negative for chest pain.   Gastrointestinal:  Negative for abdominal pain, blood in stool, constipation, diarrhea, heartburn, melena, nausea and vomiting.   Musculoskeletal:         L buttock pain   All other systems reviewed and are negative.      Physical Exam:  BP (!) 140/79 (BP Location: Left arm, Patient Position: Sitting, BP Method: Large (Automatic))   Pulse 97   Wt 102.4 kg (225 lb 12 oz)   LMP  01/01/2012 Comment: hysterectomy  SpO2 96%   BMI 39.99 kg/m²   Physical Exam  Constitutional:       General: She is not in acute distress.     Appearance: She is not ill-appearing or toxic-appearing.   Cardiovascular:      Rate and Rhythm: Normal rate and regular rhythm.   Pulmonary:      Effort: Pulmonary effort is normal. No respiratory distress.   Abdominal:      General: There is no distension.      Palpations: Abdomen is soft.      Tenderness: There is no abdominal tenderness. There is no guarding or rebound.   Musculoskeletal:      Right lower leg: No edema.      Left lower leg: No edema.      Comments: Tortuous superficial veins to bilateral legs from knees to ankles    Skin:     General: Skin is warm and dry.      Capillary Refill: Capillary refill takes less than 2 seconds.      Comments: Small, partially mobile subcutaneous mass in the outer upper quadrant of the left buttock.  Palpable deep in the subcutaneous space    Neurological:      Mental Status: She is alert. Mental status is at baseline.       A/P: Pt is a 48 y.o. female who presents with subcutaneous mass of the L buttock  -To OR 12/6 for excision of L buttock subcut mass   -Informed consent obtained   -[facility] L buttock   -pre-op labs  -Pt with extensive varicose veins of bilateral LE - will refer to [facility] in [place] for evaluation      Rajwinder Mcdermott MD  [phone]

## 2023-11-13 NOTE — H&P
[facility] H&P      Consult: mass of the L buttock   Consulting Service: SAM Duncan   Chief Complaint: L buttock pain    HPI: Pt is a 48 y.o. female who presents with subcutaneous nodule of the L buttock.  Has noticed nodule for about a week.  Reports radiating pain down the LLE and up the back.  Voices no other nodules of masses on the body.     PMH:   Past Medical History:   Diagnosis Date    Chronic constipation     Depression     Family history of colon cancer     Menopause     Wears dentures     FULL     PSH:   Past Surgical History:   Procedure Laterality Date    COLD KNIFE CONIZATION OF CERVIX      HYSTERECTOMY      OOPHORECTOMY      TONSILLECTOMY      TONSILLECTOMY       Meds: See medication list;  No ASA or anticoagulation   ALL: Bactrim [sulfamethoxazole-trimethoprim], Ceclor [cefaclor], Clindamycin, Erythromycin, Pcn [penicillins], and Tramadol  FHX: non contributory   SOC:   Social History     Socioeconomic History    Marital status: Single   Tobacco Use    Smoking status: Every Day     Current packs/day: 1.00     Average packs/day: 1 pack/day for 14.0 years (14.0 ttl pk-yrs)     Types: Cigarettes    Smokeless tobacco: Never   Substance and Sexual Activity    Alcohol use: Not Currently     Alcohol/week: 0.0 standard drinks of alcohol    Drug use: Never    Sexual activity: Not Currently     Comment:      ROS: Review of Systems   Constitutional:  Negative for chills, fever, malaise/fatigue and weight loss.   Respiratory:  Negative for cough.    Cardiovascular:  Negative for chest pain.   Gastrointestinal:  Negative for abdominal pain, blood in stool, constipation, diarrhea, heartburn, melena, nausea and vomiting.   Musculoskeletal:         L buttock pain   All other systems reviewed and are negative.      Physical Exam:  BP (!) 140/79 (BP Location: Left arm, Patient Position: Sitting, BP Method: Large (Automatic))   Pulse 97   Wt 102.4 kg (225 lb 12 oz)   LMP  01/01/2012 Comment: hysterectomy  SpO2 96%   BMI 39.99 kg/m²   Physical Exam  Constitutional:       General: She is not in acute distress.     Appearance: She is not ill-appearing or toxic-appearing.   Cardiovascular:      Rate and Rhythm: Normal rate and regular rhythm.   Pulmonary:      Effort: Pulmonary effort is normal. No respiratory distress.   Abdominal:      General: There is no distension.      Palpations: Abdomen is soft.      Tenderness: There is no abdominal tenderness. There is no guarding or rebound.   Musculoskeletal:      Right lower leg: No edema.      Left lower leg: No edema.      Comments: Tortuous superficial veins to bilateral legs from knees to ankles    Skin:     General: Skin is warm and dry.      Capillary Refill: Capillary refill takes less than 2 seconds.      Comments: Small, partially mobile subcutaneous mass in the outer upper quadrant of the left buttock.  Palpable deep in the subcutaneous space    Neurological:      Mental Status: She is alert. Mental status is at baseline.       A/P: Pt is a 48 y.o. female who presents with subcutaneous mass of the L buttock  -To OR 12/6 for excision of L buttock subcut mass   -Informed consent obtained   -[facility] L buttock   -pre-op labs  -Pt with extensive varicose veins of bilateral LE - will refer to [facility] in [place] for evaluation      Rajwinder Mcdermott MD  [phone]

## 2023-11-16 DIAGNOSIS — Z12.31 SCREENING MAMMOGRAM, ENCOUNTER FOR: Primary | ICD-10-CM

## 2023-11-20 ENCOUNTER — HOSPITAL ENCOUNTER (OUTPATIENT)
Dept: RADIOLOGY | Facility: HOSPITAL | Age: 48
Discharge: HOME | End: 2023-11-20
Attending: STUDENT IN AN ORGANIZED HEALTH CARE EDUCATION/TRAINING PROGRAM
Payer: MEDICAID

## 2023-11-20 DIAGNOSIS — R22.2 MASS OF BUTTOCK: ICD-10-CM

## 2023-11-20 PROCEDURE — 76882 US LMTD JT/FCL EVL NVASC XTR: CPT | Mod: TC,LT

## 2023-12-01 ENCOUNTER — HOSPITAL ENCOUNTER (OUTPATIENT)
Dept: PULMONOLOGY | Facility: HOSPITAL | Age: 48
Discharge: HOME | End: 2023-12-01
Attending: STUDENT IN AN ORGANIZED HEALTH CARE EDUCATION/TRAINING PROGRAM
Payer: MEDICAID

## 2023-12-01 ENCOUNTER — HOSPITAL ENCOUNTER (OUTPATIENT)
Dept: PREADMISSION TESTING | Facility: HOSPITAL | Age: 48
Discharge: HOME | End: 2023-12-01
Attending: STUDENT IN AN ORGANIZED HEALTH CARE EDUCATION/TRAINING PROGRAM
Payer: MEDICAID

## 2023-12-01 VITALS — BODY MASS INDEX: 34.16 KG/M2 | WEIGHT: 205 LBS | HEIGHT: 65 IN

## 2023-12-01 DIAGNOSIS — Z01.818 PRE-OP EXAM: ICD-10-CM

## 2023-12-01 DIAGNOSIS — R22.2 MASS OF BUTTOCK: ICD-10-CM

## 2023-12-01 PROCEDURE — 93010 ELECTROCARDIOGRAM REPORT: CPT | Mod: ,,, | Performed by: INTERNAL MEDICINE

## 2023-12-01 PROCEDURE — 93010 EKG 12-LEAD: ICD-10-PCS | Mod: ,,, | Performed by: INTERNAL MEDICINE

## 2023-12-01 PROCEDURE — 93005 ELECTROCARDIOGRAM TRACING: CPT

## 2023-12-04 ENCOUNTER — ANESTHESIA EVENT (OUTPATIENT)
Dept: SURGERY | Facility: HOSPITAL | Age: 48
End: 2023-12-04
Payer: MEDICAID

## 2023-12-04 NOTE — ANESTHESIA PREPROCEDURE EVALUATION
12/04/2023  Mena Mcdaniel is a 48 y.o., female.      Pre-op Assessment    I have reviewed the Patient Summary Reports.    I have reviewed the NPO Status.   I have reviewed the Medications.     Review of Systems  Anesthesia Hx:  No problems with previous Anesthesia             Denies Family Hx of Anesthesia complications.    Denies Personal Hx of Anesthesia complications.                    Social:  Smoker       Cardiovascular:  Cardiovascular Normal                  ECG has been reviewed.                          Pulmonary:  Pulmonary Normal                       Renal/:  Renal/ Normal                 Hepatic/GI:  Hepatic/GI Normal                 Neurological:  Neurology Normal                                      Endocrine:  Endocrine Normal            Psych:  Psychiatric History anxiety depression              Lab Results   Component Value Date    WBC 6.54 12/01/2023    HGB 13.3 12/01/2023    HCT 39.5 12/01/2023    MCV 93 12/01/2023     12/01/2023      CMP  Sodium   Date Value Ref Range Status   12/01/2023 142 136 - 145 mmol/L Final     Potassium   Date Value Ref Range Status   12/01/2023 3.7 3.5 - 5.1 mmol/L Final     Chloride   Date Value Ref Range Status   12/01/2023 109 95 - 110 mmol/L Final     CO2   Date Value Ref Range Status   12/01/2023 26 23 - 29 mmol/L Final     Glucose   Date Value Ref Range Status   12/01/2023 93 70 - 110 mg/dL Final     BUN   Date Value Ref Range Status   12/01/2023 8 6 - 20 mg/dL Final     Creatinine   Date Value Ref Range Status   12/01/2023 0.6 0.5 - 1.4 mg/dL Final     Calcium   Date Value Ref Range Status   12/01/2023 8.7 8.7 - 10.5 mg/dL Final     Total Protein   Date Value Ref Range Status   12/01/2023 7.3 6.0 - 8.4 g/dL Final     Albumin   Date Value Ref Range Status   12/01/2023 3.3 (L) 3.5 - 5.2 g/dL Final     Total Bilirubin   Date Value Ref  Range Status   12/01/2023 0.2 0.1 - 1.0 mg/dL Final     Comment:     For infants and newborns, interpretation of results should be based  on gestational age, weight and in agreement with clinical  observations.    Premature Infant recommended reference ranges:  Up to 24 hours.............<8.0 mg/dL  Up to 48 hours............<12.0 mg/dL  3-5 days..................<15.0 mg/dL  6-29 days.................<15.0 mg/dL    For patients on Eltrombopag therapy, use of Dimension [place] TBIL is   not   recommended.       Alkaline Phosphatase   Date Value Ref Range Status   12/01/2023 84 55 - 135 U/L Final     AST   Date Value Ref Range Status   12/01/2023 12 10 - 40 U/L Final     ALT   Date Value Ref Range Status   12/01/2023 20 10 - 44 U/L Final     Anion Gap   Date Value Ref Range Status   12/01/2023 7 3 - 11 mmol/L Final     eGFR   Date Value Ref Range Status   12/01/2023 >60.0 >60 mL/min/1.73 m^2 Final        Physical Exam  General: Well nourished    Airway:  Mallampati: II / II  Mouth Opening: Normal  TM Distance: Normal  Tongue: Normal  Neck ROM: Normal ROM    Dental:  Dentures    Chest/Lungs:  Clear to auscultation    Heart:  Rate: Normal  Rhythm: Regular Rhythm  Sounds: Normal        Anesthesia Plan  Type of Anesthesia, risks & benefits discussed:    Anesthesia Type: MAC, Gen ETT, Gen Supraglottic Airway  Intra-op Monitoring Plan: Standard ASA Monitors  Post Op Pain Control Plan: multimodal analgesia  Induction:  IV  Airway Plan: Direct  Informed Consent: Informed consent signed with the Patient and all parties understand the risks and agree with anesthesia plan.  All questions answered.   ASA Score: 3  Day of Surgery Review of History & Physical: I have interviewed and examined the patient. I have reviewed the patient's H&P dated: There are no significant changes.     Ready For Surgery From Anesthesia Perspective.     .

## 2023-12-06 ENCOUNTER — ANESTHESIA (OUTPATIENT)
Dept: SURGERY | Facility: HOSPITAL | Age: 48
End: 2023-12-06
Payer: MEDICAID

## 2023-12-06 ENCOUNTER — HOSPITAL ENCOUNTER (OUTPATIENT)
Facility: HOSPITAL | Age: 48
Discharge: HOME | End: 2023-12-06
Attending: STUDENT IN AN ORGANIZED HEALTH CARE EDUCATION/TRAINING PROGRAM | Admitting: STUDENT IN AN ORGANIZED HEALTH CARE EDUCATION/TRAINING PROGRAM
Payer: MEDICAID

## 2023-12-06 VITALS
SYSTOLIC BLOOD PRESSURE: 137 MMHG | HEART RATE: 86 BPM | OXYGEN SATURATION: 95 % | RESPIRATION RATE: 18 BRPM | TEMPERATURE: 98 F | DIASTOLIC BLOOD PRESSURE: 66 MMHG

## 2023-12-06 DIAGNOSIS — G89.18 ACUTE POST-OPERATIVE PAIN: ICD-10-CM

## 2023-12-06 DIAGNOSIS — R22.2 MASS OF BUTTOCK: Primary | ICD-10-CM

## 2023-12-06 PROCEDURE — 63600175 PHARM REV CODE 636 W HCPCS: Performed by: NURSE ANESTHETIST, CERTIFIED REGISTERED

## 2023-12-06 PROCEDURE — 71000015 HC POSTOP RECOV 1ST HR: Performed by: STUDENT IN AN ORGANIZED HEALTH CARE EDUCATION/TRAINING PROGRAM

## 2023-12-06 PROCEDURE — 36000706: Performed by: STUDENT IN AN ORGANIZED HEALTH CARE EDUCATION/TRAINING PROGRAM

## 2023-12-06 PROCEDURE — 37000009 HC ANESTHESIA EA ADD 15 MINS: Performed by: STUDENT IN AN ORGANIZED HEALTH CARE EDUCATION/TRAINING PROGRAM

## 2023-12-06 PROCEDURE — 63600175 PHARM REV CODE 636 W HCPCS: Performed by: STUDENT IN AN ORGANIZED HEALTH CARE EDUCATION/TRAINING PROGRAM

## 2023-12-06 PROCEDURE — 37000008 HC ANESTHESIA 1ST 15 MINUTES: Performed by: STUDENT IN AN ORGANIZED HEALTH CARE EDUCATION/TRAINING PROGRAM

## 2023-12-06 PROCEDURE — 11402 EXC TR-EXT B9+MARG 1.1-2 CM: CPT | Mod: 51,,, | Performed by: STUDENT IN AN ORGANIZED HEALTH CARE EDUCATION/TRAINING PROGRAM

## 2023-12-06 PROCEDURE — 25000003 PHARM REV CODE 250: Performed by: STUDENT IN AN ORGANIZED HEALTH CARE EDUCATION/TRAINING PROGRAM

## 2023-12-06 PROCEDURE — 12031 INTMD RPR S/A/T/EXT 2.5 CM/<: CPT | Mod: ,,, | Performed by: STUDENT IN AN ORGANIZED HEALTH CARE EDUCATION/TRAINING PROGRAM

## 2023-12-06 PROCEDURE — 11402 PR EXC SKIN BENIG 1.1-2 CM TRUNK,ARM,LEG: ICD-10-PCS | Mod: 51,,, | Performed by: STUDENT IN AN ORGANIZED HEALTH CARE EDUCATION/TRAINING PROGRAM

## 2023-12-06 PROCEDURE — 12031 PR LAYR CLOS WND TRUNK,ARM,LEG <2.5 CM: ICD-10-PCS | Mod: ,,, | Performed by: STUDENT IN AN ORGANIZED HEALTH CARE EDUCATION/TRAINING PROGRAM

## 2023-12-06 PROCEDURE — 36000707: Performed by: STUDENT IN AN ORGANIZED HEALTH CARE EDUCATION/TRAINING PROGRAM

## 2023-12-06 RX ORDER — MORPHINE SULFATE 10 MG/ML
4 INJECTION INTRAMUSCULAR; INTRAVENOUS; SUBCUTANEOUS EVERY 4 HOURS PRN
Status: DISCONTINUED | OUTPATIENT
Start: 2023-12-06 | End: 2023-12-06 | Stop reason: HOSPADM

## 2023-12-06 RX ORDER — ONDANSETRON 2 MG/ML
INJECTION INTRAMUSCULAR; INTRAVENOUS
Status: DISCONTINUED | OUTPATIENT
Start: 2023-12-06 | End: 2023-12-06

## 2023-12-06 RX ORDER — LIDOCAINE HYDROCHLORIDE 10 MG/ML
INJECTION, SOLUTION INTRAVENOUS
Status: DISCONTINUED | OUTPATIENT
Start: 2023-12-06 | End: 2023-12-06

## 2023-12-06 RX ORDER — SODIUM CHLORIDE 9 MG/ML
INJECTION, SOLUTION INTRAVENOUS CONTINUOUS
Status: DISCONTINUED | OUTPATIENT
Start: 2023-12-06 | End: 2023-12-06 | Stop reason: HOSPADM

## 2023-12-06 RX ORDER — METOCLOPRAMIDE HYDROCHLORIDE 5 MG/ML
5 INJECTION INTRAMUSCULAR; INTRAVENOUS EVERY 6 HOURS PRN
Status: DISCONTINUED | OUTPATIENT
Start: 2023-12-06 | End: 2023-12-06 | Stop reason: HOSPADM

## 2023-12-06 RX ORDER — MIDAZOLAM HYDROCHLORIDE 1 MG/ML
INJECTION INTRAMUSCULAR; INTRAVENOUS
Status: DISCONTINUED | OUTPATIENT
Start: 2023-12-06 | End: 2023-12-06

## 2023-12-06 RX ORDER — HYDROCODONE BITARTRATE AND ACETAMINOPHEN 10; 325 MG/1; MG/1
1 TABLET ORAL EVERY 6 HOURS PRN
Status: DISCONTINUED | OUTPATIENT
Start: 2023-12-06 | End: 2023-12-06 | Stop reason: HOSPADM

## 2023-12-06 RX ORDER — FENTANYL CITRATE 50 UG/ML
INJECTION, SOLUTION INTRAMUSCULAR; INTRAVENOUS
Status: DISCONTINUED | OUTPATIENT
Start: 2023-12-06 | End: 2023-12-06

## 2023-12-06 RX ORDER — HYDROCODONE BITARTRATE AND ACETAMINOPHEN 7.5; 325 MG/1; MG/1
1 TABLET ORAL EVERY 6 HOURS PRN
Qty: 28 TABLET | Refills: 0 | Status: SHIPPED | OUTPATIENT
Start: 2023-12-06 | End: 2023-12-13

## 2023-12-06 RX ORDER — PROPOFOL 10 MG/ML
INJECTION, EMULSION INTRAVENOUS
Status: DISCONTINUED | OUTPATIENT
Start: 2023-12-06 | End: 2023-12-06

## 2023-12-06 RX ORDER — METHOCARBAMOL 750 MG/1
750 TABLET, FILM COATED ORAL 4 TIMES DAILY
Qty: 40 TABLET | Refills: 0 | Status: SHIPPED | OUTPATIENT
Start: 2023-12-06 | End: 2023-12-16

## 2023-12-06 RX ORDER — ONDANSETRON 2 MG/ML
4 INJECTION INTRAMUSCULAR; INTRAVENOUS EVERY 12 HOURS PRN
Status: DISCONTINUED | OUTPATIENT
Start: 2023-12-06 | End: 2023-12-06 | Stop reason: HOSPADM

## 2023-12-06 RX ORDER — BUPIVACAINE HYDROCHLORIDE AND EPINEPHRINE 2.5; 5 MG/ML; UG/ML
INJECTION, SOLUTION EPIDURAL; INFILTRATION; INTRACAUDAL; PERINEURAL
Status: DISCONTINUED | OUTPATIENT
Start: 2023-12-06 | End: 2023-12-06 | Stop reason: HOSPADM

## 2023-12-06 RX ORDER — GABAPENTIN 100 MG/1
100 CAPSULE ORAL 3 TIMES DAILY
COMMUNITY

## 2023-12-06 RX ORDER — CYCLOBENZAPRINE HYDROCHLORIDE 7.5 MG/1
10 TABLET, FILM COATED ORAL 3 TIMES DAILY PRN
COMMUNITY

## 2023-12-06 RX ADMIN — FENTANYL CITRATE 50 MCG: 50 INJECTION INTRAMUSCULAR; INTRAVENOUS at 01:12

## 2023-12-06 RX ADMIN — PROPOFOL 50 MG: 10 INJECTION, EMULSION INTRAVENOUS at 01:12

## 2023-12-06 RX ADMIN — ONDANSETRON 4 MG: 2 INJECTION INTRAMUSCULAR; INTRAVENOUS at 01:12

## 2023-12-06 RX ADMIN — MIDAZOLAM 2 MG: 1 INJECTION INTRAMUSCULAR; INTRAVENOUS at 01:12

## 2023-12-06 RX ADMIN — CEFAZOLIN 2 G: 2 INJECTION, POWDER, FOR SOLUTION INTRAMUSCULAR; INTRAVENOUS at 12:12

## 2023-12-06 RX ADMIN — LIDOCAINE HYDROCHLORIDE 50 MG: 10 INJECTION, SOLUTION INTRAVENOUS at 01:12

## 2023-12-06 RX ADMIN — SODIUM CHLORIDE: 9 INJECTION, SOLUTION INTRAVENOUS at 09:12

## 2023-12-06 NOTE — INTERVAL H&P NOTE
"Subjective:   Yoselin Pack is a 12 y.o. female who presents for Sore Throat (X1day, sore throat)      HPI:  Pleasant 12-year-old female presents urgent care with mother for 1 day of a sore throat.  Patient's brother was seen by his PCP for sore throat 1 day ago and tested positive for strep a pharyngitis.  Patient symptoms started shortly after her brothers.  She is able to maintain adequate oral intake of fluids and solids.  She denies any difficulty with swallowing.  They have not given her anything for symptoms at this time.  Patient denies fever, chills, rash, ear pain, ear discharge, cough, nausea, vomiting, dizziness, abdominal pain, or diarrhea.        Medications:    This patient does not have an active medication from one of the medication groupers.    Allergies: Patient has no known allergies.    Problem List: Yoselin Pack does not have any pertinent problems on file.    Surgical History:  No past surgical history on file.    Past Social Hx: Yoselin Pack       Past Family Hx:  Yoselin Pack family history is not on file.     Problem list, medications, and allergies reviewed by myself today in Epic.     Objective:     Pulse 70   Temp 36.7 °C (98.1 °F) (Temporal)   Resp 24   Ht 1.35 m (4' 5.15\")   Wt 27.2 kg (60 lb)   SpO2 99%   BMI 14.93 kg/m²     Physical Exam  Vitals reviewed. Exam conducted with a chaperone present.   Constitutional:       General: She is active. She is not in acute distress.     Appearance: She is not toxic-appearing.   HENT:      Right Ear: There is no impacted cerumen. Tympanic membrane is not erythematous or bulging.      Left Ear: There is no impacted cerumen. Tympanic membrane is not erythematous or bulging.      Nose: No congestion or rhinorrhea.      Mouth/Throat:      Pharynx: Oropharynx is clear. Uvula midline. Posterior oropharyngeal erythema present. No pharyngeal swelling or oropharyngeal exudate.      Tonsils: " Patient seen and examined.  No interval change since the below obtained H&P  Informed consent verified and surgical site marked  Imaging reviewed  NPO since midnight  All questions and concerns addressed  To OR for Excision of L buttock granuloma vs mass     Rajwinder Mcdermott MD  General Surgery   [phone]     Tonsillar exudate present. No tonsillar abscesses. 2+ on the right. 2+ on the left.   Eyes:      General:         Right eye: No discharge.         Left eye: No discharge.      Conjunctiva/sclera: Conjunctivae normal.      Pupils: Pupils are equal, round, and reactive to light.   Cardiovascular:      Rate and Rhythm: Normal rate and regular rhythm.      Heart sounds: No murmur heard.  Pulmonary:      Effort: Pulmonary effort is normal.      Breath sounds: Normal breath sounds.   Skin:     General: Skin is warm and dry.      Findings: No rash.   Neurological:      Mental Status: She is alert.       Assessment/Plan:     Diagnosis and associated orders:     1. Strep tonsillitis  POCT Rapid Strep A    amoxicillin (AMOXIL) 400 MG/5ML suspension         Comments/MDM:     POCT strep a negative.  Patient's physical exam was clinically consistent with strep a pharyngitis.  Patient's brother tested positive yesterday and patient's symptoms just started shortly after his.  I have high suspicion for strep a pharyngitis despite negative rapid test.  We will empirically treat with amoxicillin.  Patient's mother is agreeable to this.  May use warm salt water gargles, home humidifier, nasal saline spray, and plenty of oral hydration.  Educated on the use of amoxicillin and the possible side effects including allergic reaction.  Patient is well-appearing and vitals all within normal limits.  No signs of epiglottitis or threat to the airway.  ED/return precautions were given.         Differential diagnosis, natural history, supportive care, and indications for immediate follow-up discussed.    Advised the patient to follow-up with the primary care physician for recheck, reevaluation, and consideration of further management.    Please note that this dictation was created using voice recognition software. I have made a reasonable attempt to correct obvious errors, but I expect that there are errors of grammar and possibly content that I  did not discover before finalizing the note.    Electronically signed by Kristopher Taylor PA-C.

## 2023-12-06 NOTE — DISCHARGE INSTRUCTIONS
FOLLOW UP WITH DR LAI AS SCHEDULED.    NO DRIVING OR DRINKING ALCOHOL FOR 24 HOURS OR WHILE TAKING PAIN MEDICATIONS.    CALL DR LAI'S OFFICE FOR ANY QUESTIONS OR CONCERNS.  REPORT TO THE ER IF URGENT.    THANK YOU FOR CHOOSING [facility]!

## 2023-12-06 NOTE — PLAN OF CARE
DC INSTRUCTIONS GIVEN TO PT, PT ADMITS WILL NOT BE COMPLIANT WITH NOT SMOKING, EDUCATED ON THE REASON FOR NOT SMOKING, HEALING PROCESS, INFECTION, ETC. PT REFUSES TO STOP SMOKING.

## 2023-12-06 NOTE — DISCHARGE SUMMARY
[facility] - Surgery  Discharge Note  Short Stay    Procedure(s) (LRB):  EXCISION, MASS, BUTTOCK (Left)      OUTCOME: Patient tolerated treatment/procedure well without complication and is now ready for discharge.    DISPOSITION: Home or Self Care    FINAL DIAGNOSIS:  <principal problem not specified>    FOLLOWUP: In clinic    DISCHARGE INSTRUCTIONS:    Discharge Procedure Orders   Diet Adult Regular     Lifting restrictions   Order Comments: No excessive bending over or deep squatting for two weeks     Notify your health care provider if you experience any of the following:  temperature >100.4     Notify your health care provider if you experience any of the following:  persistent nausea and vomiting or diarrhea     Notify your health care provider if you experience any of the following:  severe uncontrolled pain     Notify your health care provider if you experience any of the following:  redness, tenderness, or signs of infection (pain, swelling, redness, odor or green/yellow discharge around incision site)     Other restrictions (specify):   Order Comments: No smoking for two weeks to reduce the risk of infection and wound complications     No dressing needed   Order Comments: Do not get wet until 12/7/2023 (24 hours)  After, you may shower like normal, allowing soapy water to run over incision  Skin glue will fall off on its own with time  Do not scrub or submerge in water for two weeks     Activity as tolerated        TIME SPENT ON DISCHARGE: 8 minutes

## 2023-12-06 NOTE — TRANSFER OF CARE
Anesthesia Transfer of Care Note    Patient: Mena Mcdaniel    Procedure(s) Performed: Procedure(s) (LRB):  EXCISION, MASS, BUTTOCK (Left)    Patient location: Other: OR    Anesthesia Type: MAC    Transport from OR: Transported from OR on room air with adequate spontaneous ventilation    Post pain: adequate analgesia    Post assessment: no apparent anesthetic complications    Post vital signs: stable    Level of consciousness: awake    Nausea/Vomiting: no nausea/vomiting    Complications: none    Transfer of care protocol was followed      Last vitals:   95/55  16 RR  37 C TEMP  95% O2 SAT  75 HR

## 2023-12-06 NOTE — PLAN OF CARE
PT AA&OX3, ATE LUNCH AND TOLERATED WELL, OUT OF BED DRESSING SELF, NO C/O, CALLED MOTHER FOR RIDE HOME.

## 2023-12-06 NOTE — PLAN OF CARE
Received pt to room 521 accompanied by Mily,RN, pt aa&ox3, snack available. Call barber in reach, call with needs.

## 2023-12-06 NOTE — OP NOTE
[facility] - OR Periop Services  General Surgery Department  Operative Note    SUMMARY     Date of Procedure: 12/6/2023    Procedure: Procedure(s) (LRB):  EXCISION, MASS, BUTTOCK:  Left    Surgeon(s) and Role:  Rajwinder Mcdermott MD     Pre-Operative Diagnosis: Pre-Op Diagnosis Codes:     * Mass of buttock [R22.2]    Post-Operative Diagnosis: Same         Anesthesia: General/MAC    Findings:  1.7 x 1.8cm subcutaneous nodularity removed from left buttock.      Indications for the Procedure:  47yo female who presents with painful subcutaneous mass versus injection granuloma as demonstrated on pre-op ultrasound.     Operative Conduct in Detail:   Patient was seen in holding where all questions concerns were addressed.  The palpable mass along the upper portion of the left buttock was marked.  Preop antibiotics were given in day surgery.  The patient was brought to the operating room and placed in the right lateral decubitus position and placed under MAC anesthesia.  The left buttock was prepped and draped in sterile fashion.  A time-out was performed in the operating room with all present and in agreement.      Local anesthetic was infiltrated and a semilunar incision was created over the palpable mass and carried down with electrocautery through skin and subcutaneous tissue until the gluteal fat was encountered.  Patient with a 1.7 x 1.8 cm firm palpable mass immediately deep to the dermis.  This mass was circumferentially dissected from the surrounding fat and passed off for permanent section.  No other palpable masses were appreciated.  The wound was copiously irrigated with normal saline and hemostasis was achieved where appropriate.  Wound was closed with 2-0 Vicryl interrupted deep dermal and 3-0 Monocryl subcuticular running suture.  Dermabond was placed.  Patient was then aroused from sedation taken to PACU with no untoward event.  All lap and instrument counts were correct x2 prior to and after incision  closure.    Complications: No    Estimated Blood Loss (EBL): Minimal           Implants: * No implants in log *    Specimens:   Specimens (From admission, onward)       Start     Ordered    12/06/23 1339  Specimen to Pathology, Surgery General Surgery  Once        Comments: Pre-op Diagnosis: Mass of buttock [R22.2]Procedure(s):EXCISION, MASS, BUTTOCK Number of specimens: 1Name of specimens: Left Buttock Mass @ 1330     References:    Click here for ordering Quick Tip   Question Answer Comment   Procedure Type: General Surgery    Which provider would you like to cc? NEERU LAI    Release to patient Immediate        12/06/23 1339                             Condition: Good    Disposition: PACU - hemodynamically stable.        Neeru Lai MD  General Surgery   [phone]

## 2023-12-08 NOTE — ANESTHESIA POSTPROCEDURE EVALUATION
Anesthesia Post Evaluation    Patient: Mena Mcdaniel    Procedure(s) Performed: Procedure(s) (LRB):  EXCISION, MASS, BUTTOCK (Left)    Final Anesthesia Type: MAC      Patient location during evaluation: PACU  Patient participation: Yes- Able to Participate  Level of consciousness: awake  Post-procedure vital signs: reviewed and stable  Pain management: adequate  Airway patency: patent    PONV status at discharge: No PONV  Anesthetic complications: no      Cardiovascular status: blood pressure returned to baseline  Respiratory status: spontaneous ventilation  Hydration status: euvolemic  Follow-up not needed.              Vitals Value Taken Time   /66 12/06/23 1415   Temp 36.6 °C (97.9 °F) 12/06/23 1357   Pulse 86 12/06/23 1415   Resp 18 12/06/23 1415   SpO2 95 % 12/06/23 1415         No case tracking events are documented in the log.      Pain/Fatmata Score: No data recorded

## 2023-12-13 ENCOUNTER — HOSPITAL ENCOUNTER (OUTPATIENT)
Dept: RADIOLOGY | Facility: HOSPITAL | Age: 48
Discharge: HOME | End: 2023-12-13
Attending: OBSTETRICS & GYNECOLOGY
Payer: MEDICAID

## 2023-12-13 DIAGNOSIS — Z12.31 SCREENING MAMMOGRAM, ENCOUNTER FOR: ICD-10-CM

## 2023-12-13 LAB — SPECIMEN TO PATHOLOGY - SURGICAL: NORMAL

## 2023-12-13 PROCEDURE — 77067 SCR MAMMO BI INCL CAD: CPT | Mod: TC

## 2023-12-21 ENCOUNTER — OFFICE VISIT (OUTPATIENT)
Dept: SURGERY | Facility: CLINIC | Age: 48
End: 2023-12-21
Payer: MEDICAID

## 2023-12-21 VITALS
WEIGHT: 224.75 LBS | OXYGEN SATURATION: 99 % | SYSTOLIC BLOOD PRESSURE: 133 MMHG | DIASTOLIC BLOOD PRESSURE: 73 MMHG | BODY MASS INDEX: 37.4 KG/M2 | HEART RATE: 97 BPM

## 2023-12-21 DIAGNOSIS — R22.2 MASS OF BUTTOCK: Primary | ICD-10-CM

## 2023-12-21 PROCEDURE — 99024 PR POST-OP FOLLOW-UP VISIT: ICD-10-PCS | Mod: ,,, | Performed by: STUDENT IN AN ORGANIZED HEALTH CARE EDUCATION/TRAINING PROGRAM

## 2023-12-21 PROCEDURE — 99999 PR PBB SHADOW E&M-EST. PATIENT-LVL III: ICD-10-PCS | Mod: PBBFAC,,, | Performed by: STUDENT IN AN ORGANIZED HEALTH CARE EDUCATION/TRAINING PROGRAM

## 2023-12-21 PROCEDURE — 99999 PR PBB SHADOW E&M-EST. PATIENT-LVL III: CPT | Mod: PBBFAC,,, | Performed by: STUDENT IN AN ORGANIZED HEALTH CARE EDUCATION/TRAINING PROGRAM

## 2023-12-21 PROCEDURE — 3075F PR MOST RECENT SYSTOLIC BLOOD PRESS GE 130-139MM HG: ICD-10-PCS | Mod: CPTII,,, | Performed by: STUDENT IN AN ORGANIZED HEALTH CARE EDUCATION/TRAINING PROGRAM

## 2023-12-21 PROCEDURE — 99024 POSTOP FOLLOW-UP VISIT: CPT | Mod: ,,, | Performed by: STUDENT IN AN ORGANIZED HEALTH CARE EDUCATION/TRAINING PROGRAM

## 2023-12-21 PROCEDURE — 3078F DIAST BP <80 MM HG: CPT | Mod: CPTII,,, | Performed by: STUDENT IN AN ORGANIZED HEALTH CARE EDUCATION/TRAINING PROGRAM

## 2023-12-21 PROCEDURE — 1159F PR MEDICATION LIST DOCUMENTED IN MEDICAL RECORD: ICD-10-PCS | Mod: CPTII,,, | Performed by: STUDENT IN AN ORGANIZED HEALTH CARE EDUCATION/TRAINING PROGRAM

## 2023-12-21 PROCEDURE — 99213 OFFICE O/P EST LOW 20 MIN: CPT | Mod: PBBFAC | Performed by: STUDENT IN AN ORGANIZED HEALTH CARE EDUCATION/TRAINING PROGRAM

## 2023-12-21 PROCEDURE — 1159F MED LIST DOCD IN RCRD: CPT | Mod: CPTII,,, | Performed by: STUDENT IN AN ORGANIZED HEALTH CARE EDUCATION/TRAINING PROGRAM

## 2023-12-21 PROCEDURE — 3075F SYST BP GE 130 - 139MM HG: CPT | Mod: CPTII,,, | Performed by: STUDENT IN AN ORGANIZED HEALTH CARE EDUCATION/TRAINING PROGRAM

## 2023-12-21 PROCEDURE — 3078F PR MOST RECENT DIASTOLIC BLOOD PRESSURE < 80 MM HG: ICD-10-PCS | Mod: CPTII,,, | Performed by: STUDENT IN AN ORGANIZED HEALTH CARE EDUCATION/TRAINING PROGRAM

## 2023-12-30 DIAGNOSIS — F41.1 GENERALIZED ANXIETY DISORDER: ICD-10-CM

## 2023-12-31 NOTE — PROGRESS NOTES
[facility]  General Surgery Clinic Progress Note    HPI:  Mena Mcdaniel is a 48 y.o. female with history of subcutaneous mass of the left buttock s/p excision who presents for follow up.  Voices no complaints.  Denies pain to excision site.     ROS:  Negative except above    PE:  Vitals:    12/21/23 1531   BP: 133/73   BP Location: Right arm   Patient Position: Sitting   BP Method: Large (Automatic)   Pulse: 97   SpO2: 99%   Weight: 101.9 kg (224 lb 12.1 oz)        Gen: Alert and oriented x3, judgement intact, NAD  CV: RRR  Resp: CTAB; breaths non-labored and symmetrical  Abd: Soft, NT, ND, BS+  Extremities: No c/c/e.  Well healed incision to left buttock with no SOI    Pathology:      A/P:  48 y.o. female with abscess with granuloma of the left hip s/p excision who presents for follow up  -Incision well healed  -pathology benign  -RTC PRN     Rajwinder Mcdermott MD  General Surgery   [phone]        12/31/2023  5:52 PM

## 2024-01-02 RX ORDER — CLONAZEPAM 1 MG/1
TABLET ORAL
Qty: 60 TABLET | Refills: 1 | Status: SHIPPED | OUTPATIENT
Start: 2024-01-02

## 2024-02-27 DIAGNOSIS — F41.1 GENERALIZED ANXIETY DISORDER: ICD-10-CM

## 2024-02-28 RX ORDER — CLONAZEPAM 1 MG/1
TABLET ORAL
Qty: 60 TABLET | Refills: 1 | Status: SHIPPED | OUTPATIENT
Start: 2024-02-28 | End: 2024-03-11 | Stop reason: SDUPTHER

## 2024-03-11 ENCOUNTER — TELEPHONE (OUTPATIENT)
Dept: OBSTETRICS AND GYNECOLOGY | Facility: CLINIC | Age: 49
End: 2024-03-11
Payer: MEDICAID

## 2024-03-11 DIAGNOSIS — E89.40 SURGICAL MENOPAUSE ON HORMONE REPLACEMENT THERAPY: ICD-10-CM

## 2024-03-11 DIAGNOSIS — Z79.890 SURGICAL MENOPAUSE ON HORMONE REPLACEMENT THERAPY: ICD-10-CM

## 2024-03-11 DIAGNOSIS — F41.1 GENERALIZED ANXIETY DISORDER: ICD-10-CM

## 2024-03-11 DIAGNOSIS — F33.41 RECURRENT MAJOR DEPRESSIVE DISORDER, IN PARTIAL REMISSION: ICD-10-CM

## 2024-03-11 RX ORDER — ESCITALOPRAM OXALATE 20 MG/1
20 TABLET ORAL DAILY
Qty: 30 TABLET | Refills: 5 | Status: SHIPPED | OUTPATIENT
Start: 2024-03-11 | End: 2024-05-14 | Stop reason: SDUPTHER

## 2024-03-11 RX ORDER — CLONAZEPAM 1 MG/1
TABLET ORAL
Qty: 60 TABLET | Refills: 1 | Status: SHIPPED | OUTPATIENT
Start: 2024-03-11 | End: 2024-04-25

## 2024-03-11 RX ORDER — ESTRADIOL 2 MG/1
TABLET ORAL
Qty: 30 TABLET | Refills: 5 | Status: SHIPPED | OUTPATIENT
Start: 2024-03-11 | End: 2024-05-14 | Stop reason: SDUPTHER

## 2024-04-24 DIAGNOSIS — F41.1 GENERALIZED ANXIETY DISORDER: ICD-10-CM

## 2024-04-25 RX ORDER — CLONAZEPAM 1 MG/1
TABLET ORAL
Qty: 60 TABLET | Refills: 1 | Status: SHIPPED | OUTPATIENT
Start: 2024-04-25 | End: 2024-05-14 | Stop reason: SDUPTHER

## 2024-06-25 ENCOUNTER — TELEPHONE (OUTPATIENT)
Dept: OBSTETRICS AND GYNECOLOGY | Facility: CLINIC | Age: 49
End: 2024-06-25
Payer: MEDICAID

## 2024-06-25 DIAGNOSIS — E89.40 SURGICAL MENOPAUSE ON HORMONE REPLACEMENT THERAPY: ICD-10-CM

## 2024-06-25 DIAGNOSIS — F33.41 RECURRENT MAJOR DEPRESSIVE DISORDER, IN PARTIAL REMISSION: ICD-10-CM

## 2024-06-25 DIAGNOSIS — Z79.890 SURGICAL MENOPAUSE ON HORMONE REPLACEMENT THERAPY: ICD-10-CM

## 2024-06-25 DIAGNOSIS — F41.1 GENERALIZED ANXIETY DISORDER: ICD-10-CM

## 2024-06-25 RX ORDER — CLONAZEPAM 1 MG/1
TABLET ORAL
Qty: 60 TABLET | Refills: 1 | Status: SHIPPED | OUTPATIENT
Start: 2024-06-25 | End: 2024-07-11 | Stop reason: SDUPTHER

## 2024-06-25 RX ORDER — ESCITALOPRAM OXALATE 20 MG/1
20 TABLET ORAL DAILY
Qty: 30 TABLET | Refills: 5 | Status: SHIPPED | OUTPATIENT
Start: 2024-06-25

## 2024-06-25 RX ORDER — ESTRADIOL 2 MG/1
TABLET ORAL
Qty: 30 TABLET | Refills: 5 | Status: SHIPPED | OUTPATIENT
Start: 2024-06-25

## 2024-06-25 NOTE — TELEPHONE ENCOUNTER
----- Message from Feli Lemus MA sent at 6/18/2024  8:26 AM CDT -----  Patient states that she needs all three medications that we prescribe to be called in.

## 2024-06-30 ENCOUNTER — HOSPITAL ENCOUNTER (EMERGENCY)
Facility: HOSPITAL | Age: 49
Discharge: HOME | End: 2024-06-30
Attending: EMERGENCY MEDICINE
Payer: MEDICAID

## 2024-06-30 VITALS
HEART RATE: 96 BPM | SYSTOLIC BLOOD PRESSURE: 113 MMHG | BODY MASS INDEX: 40.32 KG/M2 | WEIGHT: 242 LBS | HEIGHT: 65 IN | OXYGEN SATURATION: 97 % | RESPIRATION RATE: 18 BRPM | DIASTOLIC BLOOD PRESSURE: 69 MMHG | TEMPERATURE: 99 F

## 2024-06-30 DIAGNOSIS — J02.9 PHARYNGITIS, UNSPECIFIED ETIOLOGY: Primary | ICD-10-CM

## 2024-06-30 LAB — GROUP A STREP, MOLECULAR: NEGATIVE

## 2024-06-30 PROCEDURE — 99284 EMERGENCY DEPT VISIT MOD MDM: CPT | Mod: 25

## 2024-06-30 PROCEDURE — 87651 STREP A DNA AMP PROBE: CPT | Performed by: NURSE PRACTITIONER

## 2024-06-30 PROCEDURE — 96372 THER/PROPH/DIAG INJ SC/IM: CPT | Performed by: NURSE PRACTITIONER

## 2024-06-30 PROCEDURE — 63600175 PHARM REV CODE 636 W HCPCS: Performed by: NURSE PRACTITIONER

## 2024-06-30 RX ORDER — BROMPHENIRAMINE MALEATE, PSEUDOEPHEDRINE HYDROCHLORIDE, AND DEXTROMETHORPHAN HYDROBROMIDE 2; 30; 10 MG/5ML; MG/5ML; MG/5ML
10 SYRUP ORAL
Qty: 118 ML | Refills: 0 | Status: SHIPPED | OUTPATIENT
Start: 2024-06-30 | End: 2024-07-07

## 2024-06-30 RX ORDER — IBUPROFEN 600 MG/1
600 TABLET ORAL EVERY 6 HOURS PRN
Qty: 20 TABLET | Refills: 0 | Status: SHIPPED | OUTPATIENT
Start: 2024-06-30 | End: 2024-07-05

## 2024-06-30 RX ORDER — KETOROLAC TROMETHAMINE 30 MG/ML
30 INJECTION, SOLUTION INTRAMUSCULAR; INTRAVENOUS
Status: COMPLETED | OUTPATIENT
Start: 2024-06-30 | End: 2024-06-30

## 2024-06-30 RX ADMIN — KETOROLAC TROMETHAMINE 30 MG: 30 INJECTION, SOLUTION INTRAMUSCULAR; INTRAVENOUS at 06:06

## 2024-06-30 NOTE — Clinical Note
"Mena Lagunas" Violeta was seen and treated in our emergency department on 6/30/2024.  She may return to work on 07/02/2024.       If you have any questions or concerns, please don't hesitate to call.      Maggi Suggs, NP"

## 2024-06-30 NOTE — ED PROVIDER NOTES
Encounter Date: 6/30/2024       History     Chief Complaint   Patient presents with    Sore Throat     Pt stated that she has been experiencing sore throat since earlier this morning. No associated symptoms.      This is a 49-year-old white female with noncontributory past medical history who presents to the emergency department with complaints of sore throat that began this morning.  She denies measured fever stuffy/runny nose, cough, vomiting, or diarrhea.  She did not take any medications prior to arrival.      Review of patient's allergies indicates:   Allergen Reactions    Bactrim [sulfamethoxazole-trimethoprim]     Ceclor [cefaclor] Hives    Clindamycin Hives    Erythromycin Hives    Pcn [penicillins] Hives     No longer allergic    Tramadol Hives     Past Medical History:   Diagnosis Date    Chronic constipation     Depression     Family history of colon cancer     Mass of buttock 11/2023    LEFT    Menopause     Wears dentures     FULL     Past Surgical History:   Procedure Laterality Date    BUTTOCK MASS EXCISION Left 12/6/2023    Procedure: EXCISION, MASS, BUTTOCK;  Surgeon: Rajwinder Mcdermott MD;  Location: [facility];  Service: General;  Laterality: Left;  5th 0900    COLD KNIFE CONIZATION OF CERVIX      COLONOSCOPY      HYSTERECTOMY      OOPHORECTOMY      TONSILLECTOMY       Family History   Problem Relation Name Age of Onset    Heart failure Mother      Hypertension Father      No Known Problems Sister      No Known Problems Sister      No Known Problems Sister      No Known Problems Sister      No Known Problems Maternal Grandmother      No Known Problems Maternal Grandfather      No Known Problems Paternal Grandmother      No Known Problems Paternal Grandfather      No Known Problems Daughter      No Known Problems Maternal Aunt      No Known Problems Maternal Uncle      No Known Problems Paternal Aunt      No Known Problems Paternal Uncle      Breast cancer Neg Hx      Ovarian cancer Neg Hx      BRCA  1/2 Neg Hx       Social History     Tobacco Use    Smoking status: Every Day     Current packs/day: 1.00     Average packs/day: 1 pack/day for 14.0 years (14.0 ttl pk-yrs)     Types: Cigarettes    Smokeless tobacco: Never   Substance Use Topics    Alcohol use: Not Currently     Alcohol/week: 0.0 standard drinks of alcohol    Drug use: Never     Review of Systems   Constitutional:  Negative for appetite change and fever.   HENT:  Positive for sore throat. Negative for congestion and rhinorrhea.    Respiratory:  Negative for cough.    Gastrointestinal:  Negative for diarrhea and vomiting.       Physical Exam     Initial Vitals   BP Pulse Resp Temp SpO2   06/30/24 1805 06/30/24 1804 06/30/24 1804 06/30/24 1804 06/30/24 1804   113/69 96 18 98.9 °F (37.2 °C) 97 %      MAP       --                Physical Exam    Nursing note and vitals reviewed.  Constitutional: She appears well-developed and well-nourished. She is active. No distress.   HENT:   Head: Normocephalic and atraumatic.   Mild posterior pharyngeal erythema   Eyes: EOM are normal. Pupils are equal, round, and reactive to light.   Neck: Neck supple.   Normal range of motion.  Cardiovascular:  Normal rate, regular rhythm and normal heart sounds.           Pulmonary/Chest: Breath sounds normal. No respiratory distress.   Musculoskeletal:      Cervical back: Normal range of motion and neck supple.     Lymphadenopathy:     She has cervical adenopathy.   Neurological: She is alert and oriented to person, place, and time. GCS eye subscore is 4. GCS verbal subscore is 5. GCS motor subscore is 6.   Skin: Skin is warm and dry. Capillary refill takes less than 2 seconds.   Psychiatric: She has a normal mood and affect. Her behavior is normal. Thought content normal.         ED Course   Procedures  Labs Reviewed   GROUP A STREP, MOLECULAR          Imaging Results    None          Medications   ketorolac injection 30 mg (30 mg Intramuscular Given 6/30/24 1819)     Medical  Decision Making  Risk  Prescription drug management.               ED Course as of 06/30/24 1912   Sun Jun 30, 2024 1910 Group a strep neg.  [CB]      ED Course User Index  [CB] Maggi Suggs NP                           Clinical Impression:  Final diagnoses:  [J02.9] Pharyngitis, unspecified etiology (Primary)          ED Disposition Condition    Discharge Stable          ED Prescriptions       Medication Sig Dispense Start Date End Date Auth. Provider    ibuprofen (ADVIL,MOTRIN) 600 MG tablet Take 1 tablet (600 mg total) by mouth every 6 (six) hours as needed for Pain. 20 tablet 6/30/2024 7/5/2024 Maggi Suggs NP    brompheniramine-pseudoeph-DM (BROMFED DM) 2-30-10 mg/5 mL Syrp Take 10 mLs by mouth every 4 to 6 hours as needed (congestion, sore throat, cough). 118 mL 6/30/2024 7/7/2024 Maggi Suggs NP          Follow-up Information       Follow up With Specialties Details Why Contact Info    PCP Follow UP  Schedule an appointment as soon as possible for a visit in 2 days for follow-up, for re-evaluation of today's complaint              Maggi Suggs NP  06/30/24 1912

## 2024-07-11 ENCOUNTER — TELEPHONE (OUTPATIENT)
Dept: OBSTETRICS AND GYNECOLOGY | Facility: CLINIC | Age: 49
End: 2024-07-11
Payer: MEDICAID

## 2024-07-11 DIAGNOSIS — F41.1 GENERALIZED ANXIETY DISORDER: ICD-10-CM

## 2024-07-11 RX ORDER — CLONAZEPAM 1 MG/1
TABLET ORAL
Qty: 60 TABLET | Refills: 1 | Status: SHIPPED | OUTPATIENT
Start: 2024-07-11 | End: 2024-07-31 | Stop reason: SDUPTHER

## 2024-08-16 DIAGNOSIS — F41.1 GENERALIZED ANXIETY DISORDER: ICD-10-CM

## 2024-08-20 RX ORDER — CLONAZEPAM 1 MG/1
TABLET ORAL
Qty: 60 TABLET | Refills: 1 | Status: SHIPPED | OUTPATIENT
Start: 2024-08-20

## 2024-09-17 ENCOUNTER — HOSPITAL ENCOUNTER (OUTPATIENT)
Dept: RADIOLOGY | Facility: HOSPITAL | Age: 49
Discharge: HOME | End: 2024-09-17
Payer: MEDICAID

## 2024-09-17 DIAGNOSIS — R06.2 WHEEZING: ICD-10-CM

## 2024-09-17 DIAGNOSIS — R06.2 WHEEZING: Primary | ICD-10-CM

## 2024-09-17 PROCEDURE — 71046 X-RAY EXAM CHEST 2 VIEWS: CPT | Mod: TC

## 2024-12-09 DIAGNOSIS — F41.1 GENERALIZED ANXIETY DISORDER: ICD-10-CM

## 2024-12-09 RX ORDER — CLONAZEPAM 1 MG/1
TABLET ORAL
Qty: 60 TABLET | Refills: 1 | Status: SHIPPED | OUTPATIENT
Start: 2024-12-09

## 2024-12-21 ENCOUNTER — HOSPITAL ENCOUNTER (EMERGENCY)
Facility: HOSPITAL | Age: 49
Discharge: HOME | End: 2024-12-21
Attending: EMERGENCY MEDICINE
Payer: MEDICAID

## 2024-12-21 VITALS
HEART RATE: 87 BPM | TEMPERATURE: 98 F | RESPIRATION RATE: 18 BRPM | SYSTOLIC BLOOD PRESSURE: 132 MMHG | HEIGHT: 65 IN | BODY MASS INDEX: 39.49 KG/M2 | DIASTOLIC BLOOD PRESSURE: 84 MMHG | WEIGHT: 237 LBS | OXYGEN SATURATION: 99 %

## 2024-12-21 DIAGNOSIS — S90.30XA CONTUSION OF FOOT OR HEEL, INITIAL ENCOUNTER: Primary | ICD-10-CM

## 2024-12-21 DIAGNOSIS — M79.673 HEEL PAIN: ICD-10-CM

## 2024-12-21 PROCEDURE — 99283 EMERGENCY DEPT VISIT LOW MDM: CPT | Mod: 25
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
## 2025-01-16 ENCOUNTER — OFFICE VISIT (OUTPATIENT)
Dept: SURGERY | Facility: CLINIC | Age: 50
End: 2025-01-16
Payer: MEDICAID

## 2025-01-16 VITALS
RESPIRATION RATE: 18 BRPM | OXYGEN SATURATION: 98 % | HEART RATE: 97 BPM | DIASTOLIC BLOOD PRESSURE: 77 MMHG | WEIGHT: 236 LBS | BODY MASS INDEX: 39.32 KG/M2 | HEIGHT: 65 IN | SYSTOLIC BLOOD PRESSURE: 146 MMHG

## 2025-01-16 DIAGNOSIS — K64.3 GRADE IV INTERNAL HEMORRHOIDS: ICD-10-CM

## 2025-01-16 DIAGNOSIS — Z01.818 PRE-OP TESTING: ICD-10-CM

## 2025-01-16 DIAGNOSIS — K62.5 RECTAL BLEEDING: Primary | ICD-10-CM

## 2025-01-16 PROCEDURE — 99215 OFFICE O/P EST HI 40 MIN: CPT | Mod: PBBFAC | Performed by: STUDENT IN AN ORGANIZED HEALTH CARE EDUCATION/TRAINING PROGRAM

## 2025-01-16 PROCEDURE — 3078F DIAST BP <80 MM HG: CPT | Mod: CPTII,,, | Performed by: STUDENT IN AN ORGANIZED HEALTH CARE EDUCATION/TRAINING PROGRAM

## 2025-01-16 PROCEDURE — 3077F SYST BP >= 140 MM HG: CPT | Mod: CPTII,,, | Performed by: STUDENT IN AN ORGANIZED HEALTH CARE EDUCATION/TRAINING PROGRAM

## 2025-01-16 PROCEDURE — 3008F BODY MASS INDEX DOCD: CPT | Mod: CPTII,,, | Performed by: STUDENT IN AN ORGANIZED HEALTH CARE EDUCATION/TRAINING PROGRAM

## 2025-01-16 PROCEDURE — 1159F MED LIST DOCD IN RCRD: CPT | Mod: CPTII,,, | Performed by: STUDENT IN AN ORGANIZED HEALTH CARE EDUCATION/TRAINING PROGRAM

## 2025-01-16 PROCEDURE — 99999 PR PBB SHADOW E&M-EST. PATIENT-LVL V: CPT | Mod: PBBFAC,,, | Performed by: STUDENT IN AN ORGANIZED HEALTH CARE EDUCATION/TRAINING PROGRAM

## 2025-01-16 PROCEDURE — 99214 OFFICE O/P EST MOD 30 MIN: CPT | Mod: S$PBB,,, | Performed by: STUDENT IN AN ORGANIZED HEALTH CARE EDUCATION/TRAINING PROGRAM

## 2025-01-16 RX ORDER — SOD SULF/POT CHLORIDE/MAG SULF 1.479 G
12 TABLET ORAL 2 TIMES DAILY
Qty: 24 TABLET | Refills: 0 | Status: SHIPPED | OUTPATIENT
Start: 2025-01-16

## 2025-01-16 RX ORDER — SODIUM CHLORIDE 9 MG/ML
INJECTION, SOLUTION INTRAVENOUS CONTINUOUS
OUTPATIENT
Start: 2025-01-16

## 2025-01-16 RX ORDER — SODIUM CHLORIDE 0.9 % (FLUSH) 0.9 %
10 SYRINGE (ML) INJECTION
OUTPATIENT
Start: 2025-01-16

## 2025-01-16 NOTE — H&P (VIEW-ONLY)
"  [facility]  General Surgery Clinic Progress Note    HPI:  Mena Mcdaniel is a 49 y.o. female with history of colon polyps s/p scope in 2021 who presents for follow up.  Reports one week of rectal bleeding after bowel movements.  Has bowel movement every other day.  No FH CRC.     ROS:  Negative except above    PE:  Vitals:    01/16/25 1103   BP: (!) 146/77   BP Location: Left arm   Patient Position: Sitting   Pulse: 97   Resp: 18   SpO2: 98%   Weight: 107 kg (236 lb)   Height: 5' 5" (1.651 m)        Wt Readings from Last 2 Encounters:   01/16/25 107 kg (236 lb)   12/21/24 107.5 kg (237 lb)           Gen: Alert and oriented x3, judgement intact, NAD  CV: RRR  Resp: CTAB; breaths non-labored and symmetrical  Abd: Soft, NT, ND, BS+  Extremities: No c/c/e  PRANAV: Grade IV left lateral internal hemorrhoid     A/P:  49 y.o. female with history of colon polyps who presents with rectal bleeding and grade IV internal hemorrhoids   -To OR 2/10 for colonoscopy and excisional hemorrhoidectomy   -Due for colonoscopy in 1 year, but will obtain now due to rectal bleeding and changes in bowel habits   -Informed consent obtained   -Sutab   -pre-op    Rajwinder Mcdermott MD  General Surgery   [phone]        1/16/2025  11:26 AM    "

## 2025-01-16 NOTE — PROGRESS NOTES
"  [facility]  General Surgery Clinic Progress Note    HPI:  Mena Mcdaniel is a 49 y.o. female with history of colon polyps s/p scope in 2021 who presents for follow up.  Reports one week of rectal bleeding after bowel movements.  Has bowel movement every other day.  No FH CRC.     ROS:  Negative except above    PE:  Vitals:    01/16/25 1103   BP: (!) 146/77   BP Location: Left arm   Patient Position: Sitting   Pulse: 97   Resp: 18   SpO2: 98%   Weight: 107 kg (236 lb)   Height: 5' 5" (1.651 m)        Wt Readings from Last 2 Encounters:   01/16/25 107 kg (236 lb)   12/21/24 107.5 kg (237 lb)           Gen: Alert and oriented x3, judgement intact, NAD  CV: RRR  Resp: CTAB; breaths non-labored and symmetrical  Abd: Soft, NT, ND, BS+  Extremities: No c/c/e  PRANAV: Grade IV left lateral internal hemorrhoid     A/P:  49 y.o. female with history of colon polyps who presents with rectal bleeding and grade IV internal hemorrhoids   -To OR 2/10 for colonoscopy and excisional hemorrhoidectomy   -Due for colonoscopy in 1 year, but will obtain now due to rectal bleeding and changes in bowel habits   -Informed consent obtained   -Sutab   -pre-op    Rajwinder Mcdermott MD  General Surgery   [phone]        1/16/2025  11:26 AM    "

## 2025-02-03 ENCOUNTER — HOSPITAL ENCOUNTER (OUTPATIENT)
Dept: PULMONOLOGY | Facility: HOSPITAL | Age: 50
Discharge: HOME | End: 2025-02-03
Attending: STUDENT IN AN ORGANIZED HEALTH CARE EDUCATION/TRAINING PROGRAM
Payer: MEDICAID

## 2025-02-03 ENCOUNTER — HOSPITAL ENCOUNTER (OUTPATIENT)
Dept: PREADMISSION TESTING | Facility: HOSPITAL | Age: 50
Discharge: HOME | End: 2025-02-03
Attending: STUDENT IN AN ORGANIZED HEALTH CARE EDUCATION/TRAINING PROGRAM
Payer: MEDICAID

## 2025-02-03 VITALS — BODY MASS INDEX: 39.32 KG/M2 | HEIGHT: 65 IN | WEIGHT: 236 LBS

## 2025-02-03 DIAGNOSIS — K64.3 GRADE IV INTERNAL HEMORRHOIDS: ICD-10-CM

## 2025-02-03 DIAGNOSIS — K62.5 RECTAL BLEEDING: ICD-10-CM

## 2025-02-03 DIAGNOSIS — Z01.818 PRE-OP TESTING: ICD-10-CM

## 2025-02-03 LAB
OHS QRS DURATION: 86 MS
OHS QTC CALCULATION: 448 MS

## 2025-02-03 PROCEDURE — 93005 ELECTROCARDIOGRAM TRACING: CPT

## 2025-02-03 PROCEDURE — 93010 ELECTROCARDIOGRAM REPORT: CPT | Mod: ,,, | Performed by: INTERNAL MEDICINE

## 2025-02-03 NOTE — DISCHARGE INSTRUCTIONS
BEFORE THE PROCEDURE:    REPORT ANY CHANGE IN YOUR PHYSICAL CONDITION TO YOUR DOCTOR IMMEDIATELY.  SELF ISOLATE AND CHECK TEMPERATURE DAILY, IF TEMP OVER 100, CALL PHYSICIAN IMMEDIATELY.    TRY TO REFRAIN FROM SMOKING AND ALCOHOL 72 HOURS BEFORE YOUR PROCEDURE.     THE DAY BEFORE YOUR PROCEDURE YOU WILL BE ON A  LIQUID DIET FROM WAKING IN THE MORNING UNTIL MIDNIGHT. YOU MAY HAVE WATER ONLY 4 HOURS PRIOR TO ARRIVAL.     REFER TO YOUR PHYSICIANS INSTRUCTIONS ON LIQUID DIET AND COLON PREP.    SOMEONE WILL CALL YOU THE DAY BEFORE YOUR PROCEDURE WITH A CHECK-IN TIME FOR YOUR PROCEDURE.    CHECK IN AT FIRST FLOOR REGISTRATION DESK.     DAY OF YOUR PROCEDURE:    NO MAKE UP, NAIL POLISH OR JEWELRY.  TAKE BLOOD PRESSURE MEDICATIONS THE MORNING OF YOUR PROCEDURE, WITH SMALL SIPS WATER, AS DIRECTED BY YOUR PHYSICIAN.   DO NOT TAKE ANY DIABETIC MEDICATIONS UNLESS DIRECTED TO DO SO BY YOUR PHYSICIAN.   CONTACT LENSES AND DENTURES MUST BE REMOVED.  A RESPONSIBLE ADULT MUST ACCOMPANY YOU HOME UPON DISCHARGE.   ONLY 1 VISITOR ALLOWED PER ROOM.     YOUR THOUGHTS AND OPINIONS HELP US TO BETTER SERVE YOU.     PLEASE PARTICIPATE IN SURVEYS ABOUT YOUR CARE.    THANK YOU FOR CHOOSING [facility].

## 2025-02-04 DIAGNOSIS — F41.1 GENERALIZED ANXIETY DISORDER: ICD-10-CM

## 2025-02-04 RX ORDER — CLONAZEPAM 1 MG/1
TABLET ORAL
Qty: 60 TABLET | Refills: 1 | Status: SHIPPED | OUTPATIENT
Start: 2025-02-04 | End: 2025-02-26 | Stop reason: SDUPTHER

## 2025-02-06 ENCOUNTER — ANESTHESIA EVENT (OUTPATIENT)
Dept: SURGERY | Facility: HOSPITAL | Age: 50
End: 2025-02-06
Payer: MEDICAID

## 2025-02-06 NOTE — ANESTHESIA PREPROCEDURE EVALUATION
02/06/2025  Mena Mcdaniel is a 49 y.o., female.      Pre-op Assessment    I have reviewed the Patient Summary Reports.    I have reviewed the NPO Status.   I have reviewed the Medications.     Review of Systems  Anesthesia Hx:  No problems with previous Anesthesia             Denies Family Hx of Anesthesia complications.    Denies Personal Hx of Anesthesia complications.                    Social:  Smoker       Cardiovascular:  Cardiovascular Normal                  ECG has been reviewed.                            Pulmonary:  Pulmonary Normal                       Renal/:  Renal/ Normal                 Hepatic/GI:  Hepatic/GI Normal                    Neurological:  Neurology Normal                                      Endocrine:  Endocrine Normal            Psych:  Psychiatric History anxiety depression              Lab Results   Component Value Date    WBC 4.98 02/03/2025    HGB 12.1 02/03/2025    HCT 37.0 02/03/2025    MCV 94 02/03/2025     02/03/2025      CMP  Sodium   Date Value Ref Range Status   02/03/2025 135 (L) 136 - 145 mmol/L Final     Potassium   Date Value Ref Range Status   02/03/2025 4.1 3.5 - 5.1 mmol/L Final     Chloride   Date Value Ref Range Status   02/03/2025 102 95 - 110 mmol/L Final     CO2   Date Value Ref Range Status   02/03/2025 26 23 - 29 mmol/L Final     Glucose   Date Value Ref Range Status   02/03/2025 140 (H) 70 - 110 mg/dL Final     BUN   Date Value Ref Range Status   02/03/2025 10 6 - 20 mg/dL Final     Creatinine   Date Value Ref Range Status   02/03/2025 0.7 0.5 - 1.4 mg/dL Final     Calcium   Date Value Ref Range Status   02/03/2025 8.4 (L) 8.7 - 10.5 mg/dL Final     Total Protein   Date Value Ref Range Status   02/03/2025 7.0 6.0 - 8.4 g/dL Final     Albumin   Date Value Ref Range Status   02/03/2025 3.5 3.5 - 5.2 g/dL Final     Total Bilirubin   Date  Value Ref Range Status   02/03/2025 0.2 0.1 - 1.0 mg/dL Final     Comment:     For infants and newborns, interpretation of results should be based  on gestational age, weight and in agreement with clinical  observations.    Premature Infant recommended reference ranges:  Up to 24 hours.............<8.0 mg/dL  Up to 48 hours............<12.0 mg/dL  3-5 days..................<15.0 mg/dL  6-29 days.................<15.0 mg/dL       Alkaline Phosphatase   Date Value Ref Range Status   02/03/2025 74 55 - 135 U/L Final     AST   Date Value Ref Range Status   02/03/2025 19 10 - 40 U/L Final     ALT   Date Value Ref Range Status   02/03/2025 14 10 - 44 U/L Final     Anion Gap   Date Value Ref Range Status   02/03/2025 7 (L) 8 - 16 mmol/L Final     eGFR   Date Value Ref Range Status   02/03/2025 >60.0 >60 mL/min/1.73 m^2 Final        Physical Exam  General: Well nourished    Airway:  Mallampati: II / I  Mouth Opening: Normal  TM Distance: Normal  Tongue: Normal  Neck ROM: Normal ROM    Dental:  Dentures    Chest/Lungs:  Clear to auscultation    Heart:  Rate: Normal  Rhythm: Regular Rhythm  Sounds: Normal        Anesthesia Plan  Type of Anesthesia, risks & benefits discussed:    Anesthesia Type: MAC  Intra-op Monitoring Plan: Standard ASA Monitors  Post Op Pain Control Plan: multimodal analgesia  Induction:  IV  Airway Plan: Direct  Informed Consent: Informed consent signed with the Patient and all parties understand the risks and agree with anesthesia plan.  All questions answered.   ASA Score: 2  Day of Surgery Review of History & Physical: I have interviewed and examined the patient. I have reviewed the patient's H&P dated: There are no significant changes.     Ready For Surgery From Anesthesia Perspective.     .

## 2025-02-10 ENCOUNTER — ANESTHESIA (OUTPATIENT)
Dept: SURGERY | Facility: HOSPITAL | Age: 50
End: 2025-02-10
Payer: MEDICAID

## 2025-02-10 ENCOUNTER — HOSPITAL ENCOUNTER (OUTPATIENT)
Facility: HOSPITAL | Age: 50
Discharge: HOME | End: 2025-02-10
Attending: STUDENT IN AN ORGANIZED HEALTH CARE EDUCATION/TRAINING PROGRAM | Admitting: STUDENT IN AN ORGANIZED HEALTH CARE EDUCATION/TRAINING PROGRAM
Payer: MEDICAID

## 2025-02-10 DIAGNOSIS — Z12.11 SCREEN FOR COLON CANCER: ICD-10-CM

## 2025-02-10 DIAGNOSIS — K62.5 RECTAL BLEEDING: ICD-10-CM

## 2025-02-10 DIAGNOSIS — K64.3 GRADE IV INTERNAL HEMORRHOIDS: Primary | ICD-10-CM

## 2025-02-10 DIAGNOSIS — G89.18 POST-OPERATIVE PAIN: ICD-10-CM

## 2025-02-10 PROBLEM — M70.62 TROCHANTERIC BURSITIS OF BOTH HIPS: Status: RESOLVED | Noted: 2021-12-16 | Resolved: 2025-02-10

## 2025-02-10 PROBLEM — M70.61 TROCHANTERIC BURSITIS OF BOTH HIPS: Status: RESOLVED | Noted: 2021-12-16 | Resolved: 2025-02-10

## 2025-02-10 PROBLEM — K64.2 GRADE III INTERNAL HEMORRHOIDS: Status: ACTIVE | Noted: 2025-02-10

## 2025-02-10 PROCEDURE — 37000008 HC ANESTHESIA 1ST 15 MINUTES: Performed by: STUDENT IN AN ORGANIZED HEALTH CARE EDUCATION/TRAINING PROGRAM

## 2025-02-10 PROCEDURE — 71000033 HC RECOVERY, INTIAL HOUR: Performed by: STUDENT IN AN ORGANIZED HEALTH CARE EDUCATION/TRAINING PROGRAM

## 2025-02-10 PROCEDURE — 36000707: Performed by: STUDENT IN AN ORGANIZED HEALTH CARE EDUCATION/TRAINING PROGRAM

## 2025-02-10 PROCEDURE — 63600175 PHARM REV CODE 636 W HCPCS: Performed by: NURSE ANESTHETIST, CERTIFIED REGISTERED

## 2025-02-10 PROCEDURE — 36000706: Performed by: STUDENT IN AN ORGANIZED HEALTH CARE EDUCATION/TRAINING PROGRAM

## 2025-02-10 PROCEDURE — 63600175 PHARM REV CODE 636 W HCPCS: Performed by: STUDENT IN AN ORGANIZED HEALTH CARE EDUCATION/TRAINING PROGRAM

## 2025-02-10 PROCEDURE — G0105 COLORECTAL SCRN; HI RISK IND: HCPCS | Mod: 51,,, | Performed by: STUDENT IN AN ORGANIZED HEALTH CARE EDUCATION/TRAINING PROGRAM

## 2025-02-10 PROCEDURE — 46255 REMOVE INT/EXT HEM 1 GROUP: CPT | Mod: 52,,, | Performed by: STUDENT IN AN ORGANIZED HEALTH CARE EDUCATION/TRAINING PROGRAM

## 2025-02-10 PROCEDURE — 25000003 PHARM REV CODE 250: Performed by: STUDENT IN AN ORGANIZED HEALTH CARE EDUCATION/TRAINING PROGRAM

## 2025-02-10 PROCEDURE — 27201423 OPTIME MED/SURG SUP & DEVICES STERILE SUPPLY: Performed by: STUDENT IN AN ORGANIZED HEALTH CARE EDUCATION/TRAINING PROGRAM

## 2025-02-10 PROCEDURE — 37000009 HC ANESTHESIA EA ADD 15 MINS: Performed by: STUDENT IN AN ORGANIZED HEALTH CARE EDUCATION/TRAINING PROGRAM

## 2025-02-10 PROCEDURE — 71000016 HC POSTOP RECOV ADDL HR: Performed by: STUDENT IN AN ORGANIZED HEALTH CARE EDUCATION/TRAINING PROGRAM

## 2025-02-10 PROCEDURE — 71000015 HC POSTOP RECOV 1ST HR: Performed by: STUDENT IN AN ORGANIZED HEALTH CARE EDUCATION/TRAINING PROGRAM

## 2025-02-10 RX ORDER — FENTANYL CITRATE 50 UG/ML
INJECTION, SOLUTION INTRAMUSCULAR; INTRAVENOUS
Status: DISCONTINUED | OUTPATIENT
Start: 2025-02-10 | End: 2025-02-11

## 2025-02-10 RX ORDER — CEFAZOLIN SODIUM 1 G/3ML
2 INJECTION, POWDER, FOR SOLUTION INTRAMUSCULAR; INTRAVENOUS
Status: COMPLETED | OUTPATIENT
Start: 2025-02-10 | End: 2025-02-10

## 2025-02-10 RX ORDER — KETOROLAC TROMETHAMINE 10 MG/1
10 TABLET, FILM COATED ORAL EVERY 6 HOURS
Qty: 20 TABLET | Refills: 0 | Status: SHIPPED | OUTPATIENT
Start: 2025-02-10 | End: 2025-02-15

## 2025-02-10 RX ORDER — LIDOCAINE HYDROCHLORIDE 10 MG/ML
INJECTION, SOLUTION EPIDURAL; INFILTRATION; INTRACAUDAL; PERINEURAL
Status: DISCONTINUED | OUTPATIENT
Start: 2025-02-10 | End: 2025-02-11

## 2025-02-10 RX ORDER — LIDOCAINE 50 MG/G
OINTMENT TOPICAL
Status: DISCONTINUED | OUTPATIENT
Start: 2025-02-10 | End: 2025-02-10 | Stop reason: HOSPADM

## 2025-02-10 RX ORDER — ONDANSETRON HYDROCHLORIDE 2 MG/ML
INJECTION, SOLUTION INTRAVENOUS
Status: DISCONTINUED | OUTPATIENT
Start: 2025-02-10 | End: 2025-02-11

## 2025-02-10 RX ORDER — SODIUM CHLORIDE 0.9 % (FLUSH) 0.9 %
10 SYRINGE (ML) INJECTION
Status: DISCONTINUED | OUTPATIENT
Start: 2025-02-10 | End: 2025-02-10 | Stop reason: HOSPADM

## 2025-02-10 RX ORDER — ACETAMINOPHEN 10 MG/ML
INJECTION, SOLUTION INTRAVENOUS
Status: DISCONTINUED | OUTPATIENT
Start: 2025-02-10 | End: 2025-02-11

## 2025-02-10 RX ORDER — SODIUM CHLORIDE 9 MG/ML
INJECTION, SOLUTION INTRAVENOUS CONTINUOUS
Status: DISCONTINUED | OUTPATIENT
Start: 2025-02-10 | End: 2025-02-10 | Stop reason: HOSPADM

## 2025-02-10 RX ORDER — PROPOFOL 10 MG/ML
INJECTION, EMULSION INTRAVENOUS
Status: DISCONTINUED | OUTPATIENT
Start: 2025-02-10 | End: 2025-02-11

## 2025-02-10 RX ORDER — ONDANSETRON 4 MG/1
4 TABLET, FILM COATED ORAL EVERY 6 HOURS PRN
Qty: 40 TABLET | Refills: 0 | Status: SHIPPED | OUTPATIENT
Start: 2025-02-10 | End: 2025-02-20

## 2025-02-10 RX ORDER — METHOCARBAMOL 750 MG/1
750 TABLET, FILM COATED ORAL 4 TIMES DAILY
Qty: 40 TABLET | Refills: 0 | Status: SHIPPED | OUTPATIENT
Start: 2025-02-10 | End: 2025-02-20

## 2025-02-10 RX ORDER — ONDANSETRON HYDROCHLORIDE 2 MG/ML
4 INJECTION, SOLUTION INTRAVENOUS DAILY PRN
Status: DISCONTINUED | OUTPATIENT
Start: 2025-02-10 | End: 2025-02-10 | Stop reason: HOSPADM

## 2025-02-10 RX ORDER — GLUCAGON 1 MG
1 KIT INJECTION
Status: DISCONTINUED | OUTPATIENT
Start: 2025-02-10 | End: 2025-02-10 | Stop reason: HOSPADM

## 2025-02-10 RX ORDER — HYDROCODONE BITARTRATE AND ACETAMINOPHEN 10; 325 MG/1; MG/1
1 TABLET ORAL EVERY 6 HOURS PRN
Qty: 28 TABLET | Refills: 0 | Status: SHIPPED | OUTPATIENT
Start: 2025-02-10 | End: 2025-02-17

## 2025-02-10 RX ORDER — BUPIVACAINE HYDROCHLORIDE AND EPINEPHRINE 5; 5 MG/ML; UG/ML
INJECTION, SOLUTION EPIDURAL; INTRACAUDAL; PERINEURAL
Status: DISCONTINUED | OUTPATIENT
Start: 2025-02-10 | End: 2025-02-10 | Stop reason: HOSPADM

## 2025-02-10 RX ORDER — DIPHENHYDRAMINE HYDROCHLORIDE 50 MG/ML
25 INJECTION INTRAMUSCULAR; INTRAVENOUS EVERY 6 HOURS PRN
Status: DISCONTINUED | OUTPATIENT
Start: 2025-02-10 | End: 2025-02-10 | Stop reason: HOSPADM

## 2025-02-10 RX ORDER — MEPERIDINE HYDROCHLORIDE 25 MG/ML
25 INJECTION INTRAMUSCULAR; INTRAVENOUS; SUBCUTANEOUS EVERY 10 MIN PRN
Status: ACTIVE | OUTPATIENT
Start: 2025-02-10 | End: 2025-02-10

## 2025-02-10 RX ADMIN — FENTANYL CITRATE 100 MCG: 50 INJECTION, SOLUTION INTRAMUSCULAR; INTRAVENOUS at 10:02

## 2025-02-10 RX ADMIN — FENTANYL CITRATE 50 MCG: 50 INJECTION, SOLUTION INTRAMUSCULAR; INTRAVENOUS at 11:02

## 2025-02-10 RX ADMIN — FENTANYL CITRATE 50 MCG: 50 INJECTION, SOLUTION INTRAMUSCULAR; INTRAVENOUS at 12:02

## 2025-02-10 RX ADMIN — LIDOCAINE HYDROCHLORIDE 50 MG: 10 INJECTION, SOLUTION EPIDURAL; INFILTRATION; INTRACAUDAL; PERINEURAL at 11:02

## 2025-02-10 RX ADMIN — ACETAMINOPHEN 1000 MG: 10 INJECTION, SOLUTION INTRAVENOUS at 11:02

## 2025-02-10 RX ADMIN — ONDANSETRON 4 MG: 2 INJECTION INTRAMUSCULAR; INTRAVENOUS at 10:02

## 2025-02-10 RX ADMIN — PROPOFOL 200 MG: 10 INJECTION, EMULSION INTRAVENOUS at 10:02

## 2025-02-10 RX ADMIN — SODIUM CHLORIDE: 9 INJECTION, SOLUTION INTRAVENOUS at 08:02

## 2025-02-10 RX ADMIN — LIDOCAINE HYDROCHLORIDE 50 MG: 10 INJECTION, SOLUTION EPIDURAL; INFILTRATION; INTRACAUDAL; PERINEURAL at 10:02

## 2025-02-10 RX ADMIN — CEFAZOLIN 2 G: 2 INJECTION, POWDER, FOR SOLUTION INTRAMUSCULAR; INTRAVENOUS at 10:02

## 2025-02-10 NOTE — TRANSFER OF CARE
Anesthesia Transfer of Care Note    Patient: Mena Mcdaniel    Procedure(s) Performed: Procedure(s) (LRB):  COLONOSCOPY (N/A)  HEMORRHOIDECTOMY (N/A)    Patient location: PACU    Anesthesia Type: general    Transport from OR: Transported from OR on room air with adequate spontaneous ventilation    Post pain: adequate analgesia    Post assessment: no apparent anesthetic complications    Post vital signs: stable    Level of consciousness: awake    Nausea/Vomiting: no nausea/vomiting    Complications: none    Transfer of care protocol was followed      Last vitals:   136/65  16 RR  100% O2 SAT  37 C TEMP  85 HR

## 2025-02-10 NOTE — PLAN OF CARE
Complained of cramping  in the abdomen when woken up; passed a minute amount of gas. Reinforced of the importance of passing gas to decreased abdominal discomfort. Verbalised an understanding.

## 2025-02-10 NOTE — OP NOTE
[facility] - OR Peri Services  General Surgery Department  Operative Note    SUMMARY     Date of Procedure: 2/10/2025    Procedure: Procedure(s) (LRB):  COLONOSCOPY: 36345 (CPT®)  HEMORRHOIDECTOMY: 00065 (CPT®)     Surgeon(s) and Role:  Rajwinder Mcdermott MD     Pre-Operative Diagnosis: Pre-Op Diagnosis Codes:      * Screen for colon cancer [Z12.11]  Grade III internal hemorrhoids    Post-Operative Diagnosis: Same         Anesthesia: General    Findings:  Colonoscopy:  -No masses or polypoid lesions seen.    -There was normal mucosa with normal submucosal venous pattern.    -No vascular lesions were identified.    -No major diverticular disease was encountered.   -Grade IV internal hemorrhoids with external component    Hemorrhoidectomy:  --right posterior  and right anterior  Grade IV internal hemorrhoid without hemorrhage   --Right anterior excisional hemorrhoidectomy performed     Indications for Procedure:  50yo female with history of colon polyps and grade IV internal hemorrhoids who presents for screening colonoscopy and excisional hemorrhoidectomy.       Operative Conduct in Detail:   The risks, benefits, and alternatives were thoroughly discussed with the patient. Despite the risks, the patient wished to proceed. Informed consent was obtained and it will scanned to the electronic chart.      The patient was placed on left lateral decubitus. After achieving moderate sedation, a digital rectal examination was performed; subsequently, a standard colonoscope was introduced through the anus and advanced without difficulty up to the cecum where terminal ileum and appendiceal orifice were visualized The scope was withdrawn slowly while the mucosa was carefully examined. The following findings were seen:    Bowel Preparation: good  Rectal exam was normal with good rectal tone and no masses or blood detected in the rectal vault.   No masses or polypoid lesions seen.    There was normal mucosa with normal  submucosal venous pattern.    No vascular lesions were identified.    No major diverticular disease was encountered.   Grade IV  internal hemorrhoids without external component    Withdrawal time >6 minutes (if no biopsies/polypectomies obtained)    Patient was then placed in the lithotomy position ensuring all pressure points were properly protected.  The anus and perineum were prepped and draped in sterile fashion.  A time-out was performed in the operating room with all present in agreement.       Local anesthetic was liberally injected around the anus and a pudendal block was performed.  After gradual digital dilation, a bullet retractor was inserted into the anus.  Patient with right posterior  and right anterior grade IV internal hemorrhoid without active hemorrhage.   The right anterior hemorrhoid was the larger of the two with friable mucosa and was chosen for excision.  An Allis clamp was placed  on the anoderm and incised with a 15 blade.  With a Eva the hemorrhoidal vein was  from the underlying sphincter muscle proximally toward the dentate line.  The Eva was clamped beneath the vein and excised with a 15 blade.  The mucosa was reapproximated, starting above the dentate line, with a running 0 Vicryl interlocking suture tightening each throw as the Eva clamp was slowly released from the tissue.  Hemostasis was ensured and the anal canal was gently irrigated with normal saline.  A hemostatic rectal tampon was placed.  Patient was then awakened from anesthesia and taken to PACU in stable condition.  All lap and instrument counts were correct x2 prior to and after wound dressing.        Complications: No    Estimated Blood Loss (EBL): 20cc             Specimens:   Specimens (From admission, onward)       Start     Ordered    02/10/25 1205  Specimen to Pathology, Surgery General Surgery  Once        Comments: Pre-op Diagnosis: Screen for colon cancer  [Z12.11]Procedure(s):COLONOSCOPYHEMORRHOIDECTOMY Number of specimens: 1Name of specimens: hemorrhoid @ 1205     References:    Click here for ordering Quick Tip   Question Answer Comment   Procedure Type: General Surgery    Release to patient Immediate        02/10/25 1224                           Condition: Good    Disposition: PACU - hemodynamically stable.    Recommendation:    Repeat colonoscopy in 10 years          Rajwinder Mcdermott MD  General Surgery   [phone]

## 2025-02-10 NOTE — DISCHARGE SUMMARY
[facility] - Surgery  Discharge Note  Short Stay    Procedure(s) (LRB):  COLONOSCOPY (N/A)  HEMORRHOIDECTOMY (N/A)      OUTCOME: Patient tolerated treatment/procedure well without complication and is now ready for discharge.    DISPOSITION: Home or Self Care    FINAL DIAGNOSIS:  Screen for colon cancer    FOLLOWUP: In clinic    DISCHARGE INSTRUCTIONS:    Discharge Procedure Orders   Diet Adult Regular   Order Comments: Pain medication may cause constipation   Avoid straining or prolonged time sitting on the toilet   MiraLax 3 times a day  Colace once a day   Mineral oil 2 times a day   Bowel regimen may be modified to avoid diarrhea     Lifting restrictions   Order Comments: No lifting, pushing, or pulling greater than 10lbs for 4 weeks     No dressing needed   Order Comments: You have a rectal dressing in place   This will fall out after your first bowel movement  If it does not fall out by the morning of 2/11, you may pull it out using the black string    Cleanse backside after every bowel movement - you may do this with a bidet, shower, hand shower, or submerging your backside in a shallow tub of water (Sitz bath)  Submerge backside in shallow bath of warm water (Sitz baths) up to four times a day as needed for comfort     Do not insert anything into the rectum for 1 month - this includes douches, fingers, suppositories, or any other foreign objects     Notify your health care provider if you experience any of the following:  temperature >100.4     Notify your health care provider if you experience any of the following:  persistent nausea and vomiting or diarrhea     Notify your health care provider if you experience any of the following:  severe uncontrolled pain     Notify your health care provider if you experience any of the following:  redness, tenderness, or signs of infection (pain, swelling, redness, odor or green/yellow discharge around incision site)     Notify your health care provider if you experience  any of the following:   Order Comments: A small amount of bleeding with bowel movements is to be expected for the first few days after surgery  Please call the hospital  (if on the weekend or after hours) or notify Dr. Mcdermott's staff at [phone] if being to experience large amounts of bleeding - enough to fill up half a measuring cup     Activity as tolerated   Order Comments: OK to walk, bathe, and prepare food.  No strenuous exercise.        TIME SPENT ON DISCHARGE: 8 minutes

## 2025-02-10 NOTE — INTERVAL H&P NOTE
Patient seen and examined.  No interval change since the below obtained H&P  Informed consent verified    NPO since midnight  Prep completed - Sutab  No anticoagulation  All questions and concerns addressed  To OR for colonoscopy and excisional hemorrhoidectomy     Rajwinder Mcdermott MD  General Surgery   [phone]

## 2025-02-10 NOTE — DISCHARGE INSTRUCTIONS
FOLLOW UP WITH DR LAI AS INSTRUCTED.  DO NOT DRINK ALCOHOL AND NO DRIVING FOR THE NEXT 24 HOURS  REST TODAY --- RESUME ACTIVITY AS TOLERATED TOMORROW   NO LIFTING,PULLING,OR PUSHING GREATER THAN 10 LBS FOR FOUR (4) WEEKS  DO NOT INSERT INTO THE RECTUM FOR ONE (1) MONTH  SUBMERGE BACKSIDE IN SHALLOW WATER IN BATH TUB (SITZ BATH ) FOUR TIMES A DAY   AS INSTRUCTED BY DR LAI  RESUME CURRENT DIET   RESUME HOME MEDICATIONS  CALL DR LAI'S OFFICE FOR ANY QUESTIONS OR CONCERNS.  REPORT TO THE ER IF URGENT.    THANK YOU FOR CHOOSING [facility]!  
18

## 2025-02-11 NOTE — ANESTHESIA POSTPROCEDURE EVALUATION
Anesthesia Post Evaluation    Patient: Mena Mcdaniel    Procedure(s) Performed: Procedure(s) (LRB):  COLONOSCOPY (N/A)  HEMORRHOIDECTOMY (N/A)    Final Anesthesia Type: general      Patient location during evaluation: OPS  Patient participation: Yes- Able to Participate  Level of consciousness: awake  Post-procedure vital signs: reviewed and stable  Pain management: adequate  Airway patency: patent    PONV status at discharge: No PONV  Anesthetic complications: no      Cardiovascular status: blood pressure returned to baseline  Respiratory status: spontaneous ventilation  Hydration status: euvolemic  Follow-up not needed.              Vitals Value Taken Time   /74 02/10/25 1351   Temp 36.8 °C (98.2 °F) 02/10/25 1351   Pulse 79 02/10/25 1351   Resp 20 02/10/25 1351   SpO2 95 % 02/10/25 1351         Event Time   Out of Recovery 12:51:00         Pain/Ftamata Score: Fatmata Score: 10 (2/10/2025  1:51 PM)

## 2025-02-13 VITALS
OXYGEN SATURATION: 95 % | DIASTOLIC BLOOD PRESSURE: 74 MMHG | SYSTOLIC BLOOD PRESSURE: 120 MMHG | HEART RATE: 79 BPM | RESPIRATION RATE: 20 BRPM | TEMPERATURE: 98 F

## 2025-02-14 LAB — SPECIMEN TO PATHOLOGY - SURGICAL: NORMAL

## 2025-02-26 DIAGNOSIS — F33.41 RECURRENT MAJOR DEPRESSIVE DISORDER, IN PARTIAL REMISSION: ICD-10-CM

## 2025-02-26 DIAGNOSIS — F41.1 GENERALIZED ANXIETY DISORDER: ICD-10-CM

## 2025-02-26 DIAGNOSIS — Z79.890 SURGICAL MENOPAUSE ON HORMONE REPLACEMENT THERAPY: ICD-10-CM

## 2025-02-26 DIAGNOSIS — E89.40 SURGICAL MENOPAUSE ON HORMONE REPLACEMENT THERAPY: ICD-10-CM

## 2025-02-26 RX ORDER — CLONAZEPAM 1 MG/1
TABLET ORAL
Qty: 60 TABLET | Refills: 1 | Status: SHIPPED | OUTPATIENT
Start: 2025-02-26

## 2025-02-26 RX ORDER — ESCITALOPRAM OXALATE 20 MG/1
20 TABLET ORAL DAILY
Qty: 30 TABLET | Refills: 5 | Status: CANCELLED | OUTPATIENT
Start: 2025-02-26

## 2025-02-26 RX ORDER — ESTRADIOL 2 MG/1
TABLET ORAL
Qty: 30 TABLET | Refills: 5 | Status: SHIPPED | OUTPATIENT
Start: 2025-02-26

## 2025-02-26 RX ORDER — ESCITALOPRAM OXALATE 20 MG/1
20 TABLET ORAL DAILY
Qty: 30 TABLET | Refills: 5 | Status: SHIPPED | OUTPATIENT
Start: 2025-02-26

## 2025-02-26 RX ORDER — CLONAZEPAM 1 MG/1
TABLET ORAL
Qty: 60 TABLET | Refills: 1 | Status: CANCELLED | OUTPATIENT
Start: 2025-02-26

## 2025-02-26 RX ORDER — ESTRADIOL 2 MG/1
TABLET ORAL
Qty: 30 TABLET | Refills: 5 | Status: CANCELLED | OUTPATIENT
Start: 2025-02-26

## 2025-02-27 ENCOUNTER — OFFICE VISIT (OUTPATIENT)
Dept: SURGERY | Facility: CLINIC | Age: 50
End: 2025-02-27
Payer: MEDICAID

## 2025-02-27 VITALS
OXYGEN SATURATION: 97 % | RESPIRATION RATE: 18 BRPM | SYSTOLIC BLOOD PRESSURE: 123 MMHG | HEIGHT: 65 IN | BODY MASS INDEX: 38.99 KG/M2 | DIASTOLIC BLOOD PRESSURE: 83 MMHG | WEIGHT: 234 LBS | HEART RATE: 98 BPM

## 2025-02-27 DIAGNOSIS — Z87.19 S/P HEMORRHOIDECTOMY: Primary | ICD-10-CM

## 2025-02-27 DIAGNOSIS — G89.18 POST-OPERATIVE PAIN: ICD-10-CM

## 2025-02-27 DIAGNOSIS — Z98.890 S/P HEMORRHOIDECTOMY: Primary | ICD-10-CM

## 2025-02-27 PROCEDURE — 99213 OFFICE O/P EST LOW 20 MIN: CPT | Mod: PBBFAC | Performed by: STUDENT IN AN ORGANIZED HEALTH CARE EDUCATION/TRAINING PROGRAM

## 2025-02-27 PROCEDURE — 99999 PR PBB SHADOW E&M-EST. PATIENT-LVL III: CPT | Mod: PBBFAC,,, | Performed by: STUDENT IN AN ORGANIZED HEALTH CARE EDUCATION/TRAINING PROGRAM

## 2025-02-27 RX ORDER — METHOCARBAMOL 750 MG/1
750 TABLET, FILM COATED ORAL 4 TIMES DAILY
Qty: 120 TABLET | Refills: 1 | Status: SHIPPED | OUTPATIENT
Start: 2025-02-27 | End: 2025-04-28

## 2025-02-27 RX ORDER — KETOROLAC TROMETHAMINE 10 MG/1
10 TABLET, FILM COATED ORAL EVERY 6 HOURS
Qty: 20 TABLET | Refills: 0 | Status: SHIPPED | OUTPATIENT
Start: 2025-02-27 | End: 2025-03-04

## 2025-02-27 RX ORDER — HYDROCODONE BITARTRATE AND ACETAMINOPHEN 5; 325 MG/1; MG/1
1 TABLET ORAL EVERY 6 HOURS PRN
Qty: 28 TABLET | Refills: 0 | Status: SHIPPED | OUTPATIENT
Start: 2025-02-27 | End: 2025-03-06

## 2025-02-28 NOTE — PROGRESS NOTES
"[facility]  General Surgery Clinic Progress Note    HPI:  Mena Mcdaniel is a 49 y.o. female s/p excisional hemorrhoidectomy who presents for follow up.  Reports some pain with bleeding with bowel movements.  Compliant with bowel regimen and having daily soft bowel movements without straining.      ROS:  Negative except above    PE:  Vitals:    02/27/25 1534   BP: 123/83   BP Location: Left arm   Patient Position: Sitting   Pulse: 98   Resp: 18   SpO2: 97%   Weight: 106.1 kg (234 lb)   Height: 5' 5" (1.651 m)        Gen: Alert and oriented x3, judgement intact, NAD  CV: RRR  Resp: CTAB; breaths non-labored and symmetrical  Abd: Soft, NT, ND, BS+  Extremities: No c/c/e  :  Small amount of skin separation at anal derm with a suture in place.  No signs of hemorrhage    Pathology:      A/P:  49 y.o. female s/p excisional hemorrhoidectomy who presents for post op follow up  -pain medication refilled  -continue bowel regimen   -RTC 2 weeks     Rajwinder Mcdermott MD  General Surgery   [phone]        2/27/2025  7:05 PM  "

## 2025-03-31 DIAGNOSIS — F41.1 GENERALIZED ANXIETY DISORDER: ICD-10-CM

## 2025-03-31 RX ORDER — CLONAZEPAM 1 MG/1
TABLET ORAL
Qty: 60 TABLET | Refills: 1 | Status: SHIPPED | OUTPATIENT
Start: 2025-03-31

## 2025-04-01 ENCOUNTER — OFFICE VISIT (OUTPATIENT)
Dept: SURGERY | Facility: CLINIC | Age: 50
End: 2025-04-01
Payer: MEDICAID

## 2025-04-01 VITALS
HEART RATE: 68 BPM | WEIGHT: 231.19 LBS | SYSTOLIC BLOOD PRESSURE: 133 MMHG | BODY MASS INDEX: 38.52 KG/M2 | HEIGHT: 65 IN | OXYGEN SATURATION: 97 % | DIASTOLIC BLOOD PRESSURE: 77 MMHG | RESPIRATION RATE: 18 BRPM

## 2025-04-01 DIAGNOSIS — K64.3 GRADE IV INTERNAL HEMORRHOIDS: Primary | ICD-10-CM

## 2025-04-01 DIAGNOSIS — K64.2 GRADE III INTERNAL HEMORRHOIDS: ICD-10-CM

## 2025-04-01 PROCEDURE — 99024 POSTOP FOLLOW-UP VISIT: CPT | Mod: ,,, | Performed by: STUDENT IN AN ORGANIZED HEALTH CARE EDUCATION/TRAINING PROGRAM

## 2025-04-01 PROCEDURE — 1159F MED LIST DOCD IN RCRD: CPT | Mod: CPTII,,, | Performed by: STUDENT IN AN ORGANIZED HEALTH CARE EDUCATION/TRAINING PROGRAM

## 2025-04-01 PROCEDURE — 99999 PR PBB SHADOW E&M-EST. PATIENT-LVL III: CPT | Mod: PBBFAC,,, | Performed by: STUDENT IN AN ORGANIZED HEALTH CARE EDUCATION/TRAINING PROGRAM

## 2025-04-01 PROCEDURE — 3075F SYST BP GE 130 - 139MM HG: CPT | Mod: CPTII,,, | Performed by: STUDENT IN AN ORGANIZED HEALTH CARE EDUCATION/TRAINING PROGRAM

## 2025-04-01 PROCEDURE — 3078F DIAST BP <80 MM HG: CPT | Mod: CPTII,,, | Performed by: STUDENT IN AN ORGANIZED HEALTH CARE EDUCATION/TRAINING PROGRAM

## 2025-04-01 PROCEDURE — 99213 OFFICE O/P EST LOW 20 MIN: CPT | Mod: PBBFAC | Performed by: STUDENT IN AN ORGANIZED HEALTH CARE EDUCATION/TRAINING PROGRAM

## 2025-04-01 RX ORDER — SODIUM CHLORIDE 9 MG/ML
INJECTION, SOLUTION INTRAVENOUS CONTINUOUS
OUTPATIENT
Start: 2025-04-01

## 2025-04-01 RX ORDER — ONDANSETRON HYDROCHLORIDE 2 MG/ML
4 INJECTION, SOLUTION INTRAVENOUS EVERY 12 HOURS PRN
OUTPATIENT
Start: 2025-04-01

## 2025-04-01 RX ORDER — CEFAZOLIN SODIUM 2 G/50ML
2 SOLUTION INTRAVENOUS
OUTPATIENT
Start: 2025-04-01

## 2025-04-01 NOTE — H&P (VIEW-ONLY)
"[facility] General Surgery Clinic Progress Note          HPI:  49 y.o. female s/p excisional hemrrhoidectomy   who presents for follow up.  Denies pain or bleeding with bowel movements.  Is ready to have additional hemorrhoid removed.     PMH:   Past Medical History:   Diagnosis Date    Chronic constipation     Depression     Family history of colon cancer     Mass of buttock 11/2023    LEFT    Menopause     Screen for colon cancer 2/10/2025    Thyroid disease     Wears dentures     FULL     PSH:   Past Surgical History:   Procedure Laterality Date    BUTTOCK MASS EXCISION Left 12/6/2023    Procedure: EXCISION, MASS, BUTTOCK;  Surgeon: Rajwinder Mcdermott MD;  Location: [facility] OR;  Service: General;  Laterality: Left;  5th 0900    COLD KNIFE CONIZATION OF CERVIX      COLONOSCOPY      COLONOSCOPY N/A 2/10/2025    Procedure: COLONOSCOPY;  Surgeon: Rajwinder Mcdermott MD;  Location: [facility] OR;  Service: General;  Laterality: N/A;  5th - 0900    EXCISIONAL HEMORRHOIDECTOMY N/A 2/10/2025    Procedure: HEMORRHOIDECTOMY;  Surgeon: Rajwinder Mcdermott MD;  Location: [facility] OR;  Service: General;  Laterality: N/A;    HYSTERECTOMY      OOPHORECTOMY      TONSILLECTOMY       Meds: See medication list;  No anticoagulation  ALL: Bactrim [sulfamethoxazole-trimethoprim], Ceclor [cefaclor], Clindamycin, Erythromycin, Pcn [penicillins], Shellfish containing products, and Tramadol  FHX: non contributory   SOC: Social History[1]  ROS: Review of Systems   Constitutional:  Negative for chills, fever, malaise/fatigue and weight loss.   Respiratory:  Negative for cough.    Cardiovascular:  Negative for chest pain.   Gastrointestinal:  Negative for abdominal pain, blood in stool, constipation, diarrhea, heartburn, melena, nausea and vomiting.   All other systems reviewed and are negative.      Physical Exam:  /77 (BP Location: Left arm, Patient Position: Sitting)   Pulse 68   Resp 18   Ht 5' 5" (1.651 m)   Wt 104.9 kg (231 lb 3.2 " oz)   LMP 01/01/2012 Comment: hysterectomy  SpO2 97%   BMI 38.47 kg/m²   Physical Exam  Constitutional:       General: She is not in acute distress.     Appearance: She is obese. She is not ill-appearing or toxic-appearing.   Cardiovascular:      Rate and Rhythm: Normal rate and regular rhythm.   Pulmonary:      Effort: Pulmonary effort is normal. No respiratory distress.   Abdominal:      General: There is no distension.      Palpations: Abdomen is soft.      Tenderness: There is no abdominal tenderness. There is no guarding or rebound.   Genitourinary:     Rectum: Normal.      Comments: Right posterior and left lateral grade III internal hemorrhoids   Musculoskeletal:      Right lower leg: No edema.      Left lower leg: No edema.   Skin:     General: Skin is warm and dry.      Capillary Refill: Capillary refill takes less than 2 seconds.   Neurological:      Mental Status: She is alert. Mental status is at baseline.       Wt Readings from Last 2 Encounters:   04/01/25 104.9 kg (231 lb 3.2 oz)   02/27/25 106.1 kg (234 lb)           A/P:  49 y.o. female with internal hemorrhoies who presents for follow up  -To OR 4/23 for excisional hemorrhoidectomy   -Informed consent obtained   -Pre-op       Rajwinder Mcdermott MD  [phone]           [1]   Social History  Socioeconomic History    Marital status: Single   Tobacco Use    Smoking status: Every Day     Current packs/day: 0.50     Average packs/day: 0.8 packs/day for 25.2 years (19.6 ttl pk-yrs)     Types: Cigarettes     Start date: 2014    Smokeless tobacco: Never   Substance and Sexual Activity    Alcohol use: Not Currently     Alcohol/week: 0.0 standard drinks of alcohol    Drug use: Never    Sexual activity: Not Currently     Comment:

## 2025-04-01 NOTE — PROGRESS NOTES
"[facility] General Surgery Clinic Progress Note          HPI:  49 y.o. female s/p excisional hemrrhoidectomy   who presents for follow up.  Denies pain or bleeding with bowel movements.  Is ready to have additional hemorrhoid removed.     PMH:   Past Medical History:   Diagnosis Date    Chronic constipation     Depression     Family history of colon cancer     Mass of buttock 11/2023    LEFT    Menopause     Screen for colon cancer 2/10/2025    Thyroid disease     Wears dentures     FULL     PSH:   Past Surgical History:   Procedure Laterality Date    BUTTOCK MASS EXCISION Left 12/6/2023    Procedure: EXCISION, MASS, BUTTOCK;  Surgeon: Rajwinder Mcdermott MD;  Location: [facility] OR;  Service: General;  Laterality: Left;  5th 0900    COLD KNIFE CONIZATION OF CERVIX      COLONOSCOPY      COLONOSCOPY N/A 2/10/2025    Procedure: COLONOSCOPY;  Surgeon: Rajwinder Mcdermott MD;  Location: [facility] OR;  Service: General;  Laterality: N/A;  5th - 0900    EXCISIONAL HEMORRHOIDECTOMY N/A 2/10/2025    Procedure: HEMORRHOIDECTOMY;  Surgeon: Rajwinder Mcdermott MD;  Location: [facility] OR;  Service: General;  Laterality: N/A;    HYSTERECTOMY      OOPHORECTOMY      TONSILLECTOMY       Meds: See medication list;  No anticoagulation  ALL: Bactrim [sulfamethoxazole-trimethoprim], Ceclor [cefaclor], Clindamycin, Erythromycin, Pcn [penicillins], Shellfish containing products, and Tramadol  FHX: non contributory   SOC: Social History[1]  ROS: Review of Systems   Constitutional:  Negative for chills, fever, malaise/fatigue and weight loss.   Respiratory:  Negative for cough.    Cardiovascular:  Negative for chest pain.   Gastrointestinal:  Negative for abdominal pain, blood in stool, constipation, diarrhea, heartburn, melena, nausea and vomiting.   All other systems reviewed and are negative.      Physical Exam:  /77 (BP Location: Left arm, Patient Position: Sitting)   Pulse 68   Resp 18   Ht 5' 5" (1.651 m)   Wt 104.9 kg (231 lb 3.2 " oz)   LMP 01/01/2012 Comment: hysterectomy  SpO2 97%   BMI 38.47 kg/m²   Physical Exam  Constitutional:       General: She is not in acute distress.     Appearance: She is obese. She is not ill-appearing or toxic-appearing.   Cardiovascular:      Rate and Rhythm: Normal rate and regular rhythm.   Pulmonary:      Effort: Pulmonary effort is normal. No respiratory distress.   Abdominal:      General: There is no distension.      Palpations: Abdomen is soft.      Tenderness: There is no abdominal tenderness. There is no guarding or rebound.   Genitourinary:     Rectum: Normal.      Comments: Right posterior and left lateral grade III internal hemorrhoids   Musculoskeletal:      Right lower leg: No edema.      Left lower leg: No edema.   Skin:     General: Skin is warm and dry.      Capillary Refill: Capillary refill takes less than 2 seconds.   Neurological:      Mental Status: She is alert. Mental status is at baseline.       Wt Readings from Last 2 Encounters:   04/01/25 104.9 kg (231 lb 3.2 oz)   02/27/25 106.1 kg (234 lb)           A/P:  49 y.o. female with internal hemorrhoies who presents for follow up  -To OR 4/23 for excisional hemorrhoidectomy   -Informed consent obtained   -Pre-op       Rajwinder Mcdermott MD  [phone]           [1]   Social History  Socioeconomic History    Marital status: Single   Tobacco Use    Smoking status: Every Day     Current packs/day: 0.50     Average packs/day: 0.8 packs/day for 25.2 years (19.6 ttl pk-yrs)     Types: Cigarettes     Start date: 2014    Smokeless tobacco: Never   Substance and Sexual Activity    Alcohol use: Not Currently     Alcohol/week: 0.0 standard drinks of alcohol    Drug use: Never    Sexual activity: Not Currently     Comment:

## 2025-04-16 ENCOUNTER — HOSPITAL ENCOUNTER (OUTPATIENT)
Dept: PREADMISSION TESTING | Facility: HOSPITAL | Age: 50
Discharge: HOME | End: 2025-04-16
Attending: STUDENT IN AN ORGANIZED HEALTH CARE EDUCATION/TRAINING PROGRAM
Payer: MEDICAID

## 2025-04-16 VITALS — HEIGHT: 65 IN | WEIGHT: 230 LBS | BODY MASS INDEX: 38.32 KG/M2

## 2025-04-16 NOTE — DISCHARGE INSTRUCTIONS
BEFORE THE PROCEDURE:    REPORT ANY CHANGE IN YOUR PHYSICAL CONDITION TO YOUR DOCTOR IMMEDIATELY.  SELF ISOLATE AND CHECK TEMPERATURE DAILY, IF TEMP OVER 100, CALL PHYSICIAN IMMEDIATELY.  TRY TO REFRAIN FROM SMOKING AND ALCOHOL 72 HOURS BEFORE YOUR PROCEDURE.   DO NOT EAT AFTER MIDNIGHT THE NIGHT BEFORE YOUR PROCEDURE. DO NOT DRINK ANYTHING AFTER MIDNIGHT EXCEPT YOU CAN HAVE WATER ONLY UP TO 4 HOURS PRIOR TO ARRIVAL.  NO MAKE UP, NAIL POLISH OR JEWELRY.      SURGERY PREP INSTRUCTIONS    SOMEONE WILL CALL YOU THE DAY BEFORE YOUR PROCEDURE WITH A CHECK-IN TIME FOR YOUR PROCEDURE.    DAY OF YOUR PROCEDURE:    TAKE BLOOD PRESSURE MEDICATIONS THE MORNING OF YOUR PROCEDURE, WITH SMALL SIPS WATER, AS DIRECTED BY YOUR PHYSICIAN.   DO NOT TAKE ANY DIABETIC MEDICATIONS UNLESS DIRECTED TO DO SO BY YOUR PHYSICIAN.   CONTACT LENSES AND DENTURES MUST BE REMOVED.  A RESPONSIBLE ADULT MUST ACCOMPANY YOU HOME UPON DISCHARGE.   ONLY 1 VISITOR ALLOWED PER ROOM.     YOUR THOUGHTS AND OPINIONS HELP US TO BETTER SERVE YOU.     PLEASE PARTICIPATE IN SURVEYS ABOUT YOUR CARE.    THANK YOU FOR CHOOSING [facility].

## 2025-04-21 ENCOUNTER — TELEPHONE (OUTPATIENT)
Dept: OBSTETRICS AND GYNECOLOGY | Facility: CLINIC | Age: 50
End: 2025-04-21
Payer: MEDICAID

## 2025-04-21 DIAGNOSIS — F41.1 GENERALIZED ANXIETY DISORDER: ICD-10-CM

## 2025-04-21 DIAGNOSIS — F33.41 RECURRENT MAJOR DEPRESSIVE DISORDER, IN PARTIAL REMISSION: ICD-10-CM

## 2025-04-21 DIAGNOSIS — E89.40 SURGICAL MENOPAUSE ON HORMONE REPLACEMENT THERAPY: ICD-10-CM

## 2025-04-21 DIAGNOSIS — Z79.890 SURGICAL MENOPAUSE ON HORMONE REPLACEMENT THERAPY: ICD-10-CM

## 2025-04-21 RX ORDER — CLONAZEPAM 1 MG/1
TABLET ORAL
Qty: 60 TABLET | Refills: 1 | Status: SHIPPED | OUTPATIENT
Start: 2025-04-21

## 2025-04-21 RX ORDER — ESTRADIOL 2 MG/1
TABLET ORAL
Qty: 30 TABLET | Refills: 5 | Status: SHIPPED | OUTPATIENT
Start: 2025-04-21

## 2025-04-21 RX ORDER — ESCITALOPRAM OXALATE 20 MG/1
20 TABLET ORAL DAILY
Qty: 30 TABLET | Refills: 5 | Status: SHIPPED | OUTPATIENT
Start: 2025-04-21

## 2025-04-25 ENCOUNTER — ANESTHESIA EVENT (OUTPATIENT)
Dept: SURGERY | Facility: HOSPITAL | Age: 50
End: 2025-04-25
Payer: MEDICAID

## 2025-04-25 NOTE — ANESTHESIA PREPROCEDURE EVALUATION
04/25/2025  Mena Mcdaniel is a 50 y.o., female.      Pre-op Assessment    I have reviewed the Patient Summary Reports.    I have reviewed the NPO Status.   I have reviewed the Medications.     Review of Systems  Anesthesia Hx:  No problems with previous Anesthesia             Denies Family Hx of Anesthesia complications.    Denies Personal Hx of Anesthesia complications.                    Social:  Smoker       Cardiovascular:  Cardiovascular Normal                  ECG has been reviewed.                            Pulmonary:  Pulmonary Normal                       Renal/:  Renal/ Normal                 Hepatic/GI:  Hepatic/GI Normal                    Neurological:  Neurology Normal                                      Endocrine:   Hypothyroidism          Psych:  Psychiatric History anxiety depression              Lab Results   Component Value Date    WBC 4.98 02/03/2025    HGB 12.1 02/03/2025    HCT 37.0 02/03/2025    MCV 94 02/03/2025     02/03/2025      CMP  Sodium   Date Value Ref Range Status   02/03/2025 135 (L) 136 - 145 mmol/L Final     Potassium   Date Value Ref Range Status   02/03/2025 4.1 3.5 - 5.1 mmol/L Final     Chloride   Date Value Ref Range Status   02/03/2025 102 95 - 110 mmol/L Final     CO2   Date Value Ref Range Status   02/03/2025 26 23 - 29 mmol/L Final     Glucose   Date Value Ref Range Status   02/03/2025 140 (H) 70 - 110 mg/dL Final     BUN   Date Value Ref Range Status   02/03/2025 10 6 - 20 mg/dL Final     Creatinine   Date Value Ref Range Status   02/03/2025 0.7 0.5 - 1.4 mg/dL Final     Calcium   Date Value Ref Range Status   02/03/2025 8.4 (L) 8.7 - 10.5 mg/dL Final     Total Protein   Date Value Ref Range Status   02/03/2025 7.0 6.0 - 8.4 g/dL Final     Albumin   Date Value Ref Range Status   02/03/2025 3.5 3.5 - 5.2 g/dL Final     Total Bilirubin   Date  Value Ref Range Status   02/03/2025 0.2 0.1 - 1.0 mg/dL Final     Comment:     For infants and newborns, interpretation of results should be based  on gestational age, weight and in agreement with clinical  observations.    Premature Infant recommended reference ranges:  Up to 24 hours.............<8.0 mg/dL  Up to 48 hours............<12.0 mg/dL  3-5 days..................<15.0 mg/dL  6-29 days.................<15.0 mg/dL       Alkaline Phosphatase   Date Value Ref Range Status   02/03/2025 74 55 - 135 U/L Final     AST   Date Value Ref Range Status   02/03/2025 19 10 - 40 U/L Final     ALT   Date Value Ref Range Status   02/03/2025 14 10 - 44 U/L Final     Anion Gap   Date Value Ref Range Status   02/03/2025 7 (L) 8 - 16 mmol/L Final     eGFR   Date Value Ref Range Status   02/03/2025 >60.0 >60 mL/min/1.73 m^2 Final        Physical Exam    Airway:  Mallampati: II / I    Dental:  Dentures        Anesthesia Plan  Type of Anesthesia, risks & benefits discussed:    Anesthesia Type: Gen ETT, Gen Supraglottic Airway, Spinal  Intra-op Monitoring Plan: Standard ASA Monitors  Post Op Pain Control Plan: multimodal analgesia  Induction:  IV  Airway Plan: Direct  Informed Consent: Informed consent signed with the Patient and all parties understand the risks and agree with anesthesia plan.  All questions answered.   ASA Score: 2  Day of Surgery Review of History & Physical: I have interviewed and examined the patient. I have reviewed the patient's H&P dated: There are no significant changes.     Ready For Surgery From Anesthesia Perspective.     .

## 2025-04-28 ENCOUNTER — ANESTHESIA (OUTPATIENT)
Dept: SURGERY | Facility: HOSPITAL | Age: 50
End: 2025-04-28
Payer: MEDICAID

## 2025-04-28 ENCOUNTER — HOSPITAL ENCOUNTER (OUTPATIENT)
Facility: HOSPITAL | Age: 50
Discharge: HOME | End: 2025-04-28
Attending: STUDENT IN AN ORGANIZED HEALTH CARE EDUCATION/TRAINING PROGRAM | Admitting: STUDENT IN AN ORGANIZED HEALTH CARE EDUCATION/TRAINING PROGRAM
Payer: MEDICAID

## 2025-04-28 VITALS
HEART RATE: 86 BPM | DIASTOLIC BLOOD PRESSURE: 99 MMHG | OXYGEN SATURATION: 95 % | SYSTOLIC BLOOD PRESSURE: 149 MMHG | TEMPERATURE: 98 F | RESPIRATION RATE: 20 BRPM

## 2025-04-28 DIAGNOSIS — G89.18 POST-OPERATIVE PAIN: ICD-10-CM

## 2025-04-28 DIAGNOSIS — K64.2 GRADE III INTERNAL HEMORRHOIDS: ICD-10-CM

## 2025-04-28 DIAGNOSIS — K64.3 GRADE IV INTERNAL HEMORRHOIDS: Primary | ICD-10-CM

## 2025-04-28 PROCEDURE — 63600175 PHARM REV CODE 636 W HCPCS: Performed by: STUDENT IN AN ORGANIZED HEALTH CARE EDUCATION/TRAINING PROGRAM

## 2025-04-28 PROCEDURE — 71000016 HC POSTOP RECOV ADDL HR: Performed by: STUDENT IN AN ORGANIZED HEALTH CARE EDUCATION/TRAINING PROGRAM

## 2025-04-28 PROCEDURE — 37000008 HC ANESTHESIA 1ST 15 MINUTES: Performed by: STUDENT IN AN ORGANIZED HEALTH CARE EDUCATION/TRAINING PROGRAM

## 2025-04-28 PROCEDURE — 36000706: Performed by: STUDENT IN AN ORGANIZED HEALTH CARE EDUCATION/TRAINING PROGRAM

## 2025-04-28 PROCEDURE — 36000707: Performed by: STUDENT IN AN ORGANIZED HEALTH CARE EDUCATION/TRAINING PROGRAM

## 2025-04-28 PROCEDURE — 71000015 HC POSTOP RECOV 1ST HR: Performed by: STUDENT IN AN ORGANIZED HEALTH CARE EDUCATION/TRAINING PROGRAM

## 2025-04-28 PROCEDURE — 27201423 OPTIME MED/SURG SUP & DEVICES STERILE SUPPLY: Performed by: STUDENT IN AN ORGANIZED HEALTH CARE EDUCATION/TRAINING PROGRAM

## 2025-04-28 PROCEDURE — 25000003 PHARM REV CODE 250: Performed by: STUDENT IN AN ORGANIZED HEALTH CARE EDUCATION/TRAINING PROGRAM

## 2025-04-28 PROCEDURE — 46260 REMOVE IN/EX HEM GROUPS 2+: CPT | Mod: 78,,, | Performed by: STUDENT IN AN ORGANIZED HEALTH CARE EDUCATION/TRAINING PROGRAM

## 2025-04-28 PROCEDURE — 63600175 PHARM REV CODE 636 W HCPCS: Performed by: ANESTHESIOLOGY

## 2025-04-28 PROCEDURE — 25000003 PHARM REV CODE 250: Performed by: NURSE ANESTHETIST, CERTIFIED REGISTERED

## 2025-04-28 PROCEDURE — 63600175 PHARM REV CODE 636 W HCPCS: Performed by: NURSE ANESTHETIST, CERTIFIED REGISTERED

## 2025-04-28 PROCEDURE — 37000009 HC ANESTHESIA EA ADD 15 MINS: Performed by: STUDENT IN AN ORGANIZED HEALTH CARE EDUCATION/TRAINING PROGRAM

## 2025-04-28 PROCEDURE — 71000033 HC RECOVERY, INTIAL HOUR: Performed by: STUDENT IN AN ORGANIZED HEALTH CARE EDUCATION/TRAINING PROGRAM

## 2025-04-28 RX ORDER — ROCURONIUM BROMIDE 10 MG/ML
INJECTION, SOLUTION INTRAVENOUS
Status: DISCONTINUED | OUTPATIENT
Start: 2025-04-28 | End: 2025-04-28

## 2025-04-28 RX ORDER — GLUCAGON 1 MG
1 KIT INJECTION
Status: DISCONTINUED | OUTPATIENT
Start: 2025-04-28 | End: 2025-04-28 | Stop reason: HOSPADM

## 2025-04-28 RX ORDER — ONDANSETRON HYDROCHLORIDE 2 MG/ML
4 INJECTION, SOLUTION INTRAVENOUS EVERY 12 HOURS PRN
Status: DISCONTINUED | OUTPATIENT
Start: 2025-04-28 | End: 2025-04-28 | Stop reason: HOSPADM

## 2025-04-28 RX ORDER — KETOROLAC TROMETHAMINE 10 MG/1
10 TABLET, FILM COATED ORAL EVERY 6 HOURS
Qty: 20 TABLET | Refills: 0 | Status: SHIPPED | OUTPATIENT
Start: 2025-04-28 | End: 2025-05-03

## 2025-04-28 RX ORDER — SODIUM CHLORIDE 9 MG/ML
INJECTION, SOLUTION INTRAVENOUS CONTINUOUS
Status: DISCONTINUED | OUTPATIENT
Start: 2025-04-28 | End: 2025-04-28 | Stop reason: HOSPADM

## 2025-04-28 RX ORDER — BUPIVACAINE HYDROCHLORIDE AND EPINEPHRINE 5; 5 MG/ML; UG/ML
INJECTION, SOLUTION EPIDURAL; INTRACAUDAL; PERINEURAL
Status: DISCONTINUED | OUTPATIENT
Start: 2025-04-28 | End: 2025-04-28 | Stop reason: HOSPADM

## 2025-04-28 RX ORDER — DEXAMETHASONE SODIUM PHOSPHATE 4 MG/ML
INJECTION, SOLUTION INTRA-ARTICULAR; INTRALESIONAL; INTRAMUSCULAR; INTRAVENOUS; SOFT TISSUE
Status: DISCONTINUED | OUTPATIENT
Start: 2025-04-28 | End: 2025-04-28

## 2025-04-28 RX ORDER — METHOCARBAMOL 750 MG/1
750 TABLET, FILM COATED ORAL 4 TIMES DAILY
Qty: 40 TABLET | Refills: 0 | Status: SHIPPED | OUTPATIENT
Start: 2025-04-28 | End: 2025-05-08

## 2025-04-28 RX ORDER — HYDROCODONE BITARTRATE AND ACETAMINOPHEN 10; 325 MG/1; MG/1
1 TABLET ORAL EVERY 6 HOURS PRN
Qty: 28 TABLET | Refills: 0 | Status: SHIPPED | OUTPATIENT
Start: 2025-04-28 | End: 2025-05-05

## 2025-04-28 RX ORDER — PROPOFOL 10 MG/ML
VIAL (ML) INTRAVENOUS
Status: DISCONTINUED | OUTPATIENT
Start: 2025-04-28 | End: 2025-04-28

## 2025-04-28 RX ORDER — ONDANSETRON 4 MG/1
4 TABLET, FILM COATED ORAL EVERY 6 HOURS PRN
Qty: 40 TABLET | Refills: 0 | Status: SHIPPED | OUTPATIENT
Start: 2025-04-28 | End: 2025-05-08

## 2025-04-28 RX ORDER — DIPHENHYDRAMINE HYDROCHLORIDE 50 MG/ML
25 INJECTION, SOLUTION INTRAMUSCULAR; INTRAVENOUS EVERY 6 HOURS PRN
Status: DISCONTINUED | OUTPATIENT
Start: 2025-04-28 | End: 2025-04-28 | Stop reason: HOSPADM

## 2025-04-28 RX ORDER — HYDROMORPHONE HYDROCHLORIDE 1 MG/ML
0.5 INJECTION, SOLUTION INTRAMUSCULAR; INTRAVENOUS; SUBCUTANEOUS EVERY 5 MIN PRN
Status: DISCONTINUED | OUTPATIENT
Start: 2025-04-28 | End: 2025-04-28 | Stop reason: HOSPADM

## 2025-04-28 RX ORDER — FENTANYL CITRATE 50 UG/ML
INJECTION, SOLUTION INTRAMUSCULAR; INTRAVENOUS
Status: DISCONTINUED | OUTPATIENT
Start: 2025-04-28 | End: 2025-04-28

## 2025-04-28 RX ORDER — CEFAZOLIN 2 G/1
2 INJECTION, POWDER, FOR SOLUTION INTRAMUSCULAR; INTRAVENOUS
Status: COMPLETED | OUTPATIENT
Start: 2025-04-28 | End: 2025-04-28

## 2025-04-28 RX ORDER — MIDAZOLAM HYDROCHLORIDE 1 MG/ML
INJECTION INTRAMUSCULAR; INTRAVENOUS
Status: DISCONTINUED | OUTPATIENT
Start: 2025-04-28 | End: 2025-04-28

## 2025-04-28 RX ORDER — ONDANSETRON HYDROCHLORIDE 2 MG/ML
4 INJECTION, SOLUTION INTRAVENOUS DAILY PRN
Status: DISCONTINUED | OUTPATIENT
Start: 2025-04-28 | End: 2025-04-28 | Stop reason: HOSPADM

## 2025-04-28 RX ORDER — HYDROCODONE BITARTRATE AND ACETAMINOPHEN 10; 325 MG/1; MG/1
1 TABLET ORAL EVERY 6 HOURS PRN
Refills: 0 | Status: DISCONTINUED | OUTPATIENT
Start: 2025-04-28 | End: 2025-04-28 | Stop reason: HOSPADM

## 2025-04-28 RX ADMIN — FENTANYL CITRATE 100 MCG: 50 INJECTION INTRAMUSCULAR; INTRAVENOUS at 08:04

## 2025-04-28 RX ADMIN — SODIUM CHLORIDE: 9 INJECTION, SOLUTION INTRAVENOUS at 07:04

## 2025-04-28 RX ADMIN — SUGAMMADEX 200 MG: 100 INJECTION, SOLUTION INTRAVENOUS at 10:04

## 2025-04-28 RX ADMIN — ROCURONIUM BROMIDE 60 MG: 10 INJECTION, SOLUTION INTRAVENOUS at 08:04

## 2025-04-28 RX ADMIN — CEFAZOLIN 2 G: 2 INJECTION, POWDER, FOR SOLUTION INTRAMUSCULAR; INTRAVENOUS at 08:04

## 2025-04-28 RX ADMIN — HYDROMORPHONE HYDROCHLORIDE 0.5 MG: 1 INJECTION, SOLUTION INTRAMUSCULAR; INTRAVENOUS; SUBCUTANEOUS at 10:04

## 2025-04-28 RX ADMIN — DEXAMETHASONE SODIUM PHOSPHATE 4 MG: 4 INJECTION, SOLUTION INTRA-ARTICULAR; INTRALESIONAL; INTRAMUSCULAR; INTRAVENOUS; SOFT TISSUE at 08:04

## 2025-04-28 RX ADMIN — ROCURONIUM BROMIDE 10 MG: 10 INJECTION, SOLUTION INTRAVENOUS at 09:04

## 2025-04-28 RX ADMIN — MIDAZOLAM 2 MG: 1 INJECTION INTRAMUSCULAR; INTRAVENOUS at 08:04

## 2025-04-28 RX ADMIN — HYDROCODONE BITARTRATE AND ACETAMINOPHEN 1 TABLET: 10; 325 TABLET ORAL at 11:04

## 2025-04-28 RX ADMIN — ONDANSETRON 4 MG: 2 INJECTION INTRAMUSCULAR; INTRAVENOUS at 08:04

## 2025-04-28 RX ADMIN — PROPOFOL 200 MG: 10 INJECTION, EMULSION INTRAVENOUS at 08:04

## 2025-04-28 NOTE — DISCHARGE INSTRUCTIONS
FOLLOW UP WITH DR LAI AS INSTRUCTED.  DO NOT DRINK ALCOHOL AND NO DRIVING FOR THE NEXT 24 HOURS    CALL DR LAI'S OFFICE FOR ANY QUESTIONS OR CONCERNS.  REPORT TO THE ER IF URGENT.    THANK YOU FOR CHOOSING [facility]!

## 2025-04-28 NOTE — PLAN OF CARE
Patient back from her procedure.  Oxygen at 2 LPM via Nasal Canula in use.  Pulse ox at 95% on the 2 LPM.  Denies any pain or discomfort at this time.  Pad to surgical site with small amount of bloody drainage noted on pad.  Snack offered to patient.  Lizzeth eMek, ESTRADAN

## 2025-04-28 NOTE — DISCHARGE SUMMARY
[place] - Surgery  Discharge Note  Short Stay    Procedure(s) (LRB):  HEMORRHOIDECTOMY (N/A)      OUTCOME: Patient tolerated treatment/procedure well without complication and is now ready for discharge.    DISPOSITION: Home or Self Care    FINAL DIAGNOSIS:  Grade IV internal hemorrhoids    FOLLOWUP: In clinic    DISCHARGE INSTRUCTIONS:    Discharge Procedure Orders   Diet Dysphagia Mechanical Soft   Order Comments: Eat a liquid to soft diet for the first week after surgery   Pain medication may cause constipation   Avoid straining or prolonged time sitting on the toilet   MiraLax 3 times a day  Colace once a day   Mineral oil 2 times a day   Bowel regimen may be modified to avoid diarrhea     Lifting restrictions   Order Comments: No lifting, pushing, or pulling greater than 10lbs for 4 weeks     No dressing needed   Order Comments: You have a rectal dressing in place   This will fall out after your first bowel movement  If it does not fall out by the morning of 4/29, you may pull it out using the black string    Cleanse backside after every bowel movement - you may do this with a bidet, shower, hand shower, or submerging your backside in a shallow tub of water (Sitz bath)  Submerge backside in shallow bath of warm water (Sitz baths) up to four times a day as needed for comfort     Do not insert anything into the rectum for 1 month - this includes douches, fingers, suppositories, or any other foreign objects     Notify your health care provider if you experience any of the following:  temperature >100.4     Notify your health care provider if you experience any of the following:  persistent nausea and vomiting or diarrhea     Notify your health care provider if you experience any of the following:  severe uncontrolled pain     Notify your health care provider if you experience any of the following:  redness, tenderness, or signs of infection (pain, swelling, redness, odor or green/yellow discharge around incision  site)     Notify your health care provider if you experience any of the following:   Order Comments: A small amount of bleeding with bowel movements is to be expected for the first few days after surgery  Please call the hospital  (if on the weekend or after hours) or notify Dr. Mcdermott's staff at [phone] if being to experience large amounts of bleeding - enough to fill up half a measuring cup     Activity as tolerated   Order Comments: OK to walk, bathe, and prepare food.  No strenuous exercise.        TIME SPENT ON DISCHARGE: 5 minutes

## 2025-04-28 NOTE — PLAN OF CARE
1130  pt requesting something for pain  stated that  she has taking hydrocodone before for pain   without any problems

## 2025-04-28 NOTE — OP NOTE
[facility] - OR [facility]  General Surgery Department  Operative Note    SUMMARY     Date of Procedure: 4/28/2025    Procedure: Procedure(s) (LRB):  HEMORRHOIDECTOMY: 79151 (CPT®)       Surgeon(s) and Role:  Rajwinder Mcdermott MD     Pre-Operative Diagnosis: Pre-Op Diagnosis Codes:      * Grade IV internal hemorrhoids [K64.3]    Post-Operative Diagnosis: Same         Anesthesia: General    Findings:  --right posterior  and left lateral Grade IV internal hemorrhoid without hemorrhage   --Excisional hemorrhoidectomy performed     Indications for the Procedure:  49yo female who presents with for completion excisional hemorrhoidectomy.         Operative Conduct in Detail:   Patient was seen in holding where all questions and concerns were addressed.  Patient was wheeled to the operating room and placed in the supine position where general anesthesia was initiated.  Preoperative antibiotics were given in day surgery.  Patient was placed in the lithotomy position ensuring all pressure points were properly protected.  The anus and perineum were prepped and draped in sterile fashion.  A time-out was performed in the operating room with all present in agreement.       Local anesthetic was liberally injected around the anus and a pudendal block was performed.  After gradual digital dilation, a bullet retractor was inserted into the anus.  Patient with right posterior and left lateral  grade IV internal hemorrhoidswithout active hemorrhage.   An Allis clamp was placed  on the anoderm and incised with a 15 blade.  With a Eva the hemorrhoidal vein was  from the underlying sphincter muscle proximally toward the dentate line.  The hemorrhoids were excised with a harmonic scalpel.  The mucosa was reapproximated, starting above the dentate line, with a running 0 Vicryl interlocking suture.  Hemostasis was ensured and the anal canal was gently irrigated with normal saline.  A hemostatic rectal tampon was placed.   Patient was then awakened from anesthesia and taken to PACU in stable condition.  All lap and instrument counts were correct x2 prior to and after wound dressing.    Complications: No    Estimated Blood Loss (EBL): 15cc           Implants: * No implants in log *    Specimens:   ID Type Source Tests Collected by Time Destination   1 : Internal Hemorrhoid Tissue Hemorrhoids SPECIMEN TO PATHOLOGY Rajwinder Mcdermott MD 4/28/2025 5747               Condition: Good    Disposition: PACU - hemodynamically stable.        Rajwinder Mcdermott MD  General Surgery   [phone]

## 2025-04-28 NOTE — PLAN OF CARE
"Patient states she is feeling better.  Pain is now a "4" on pain scale.  Medication effective.   Oxygen D/C'd  Pulse ox 95 % on room air.  Patient more alert.   Lizzeth Meek LPN  "

## 2025-04-28 NOTE — INTERVAL H&P NOTE
Patient seen and examined.  No interval change since the below obtained H&P  Informed consent verified and surgical site marked  NPO since midnight  All questions and concerns addressed  To OR for excisional hemorrhoidectomy     Rajwinder Mcdermott MD  General Surgery   [phone]

## 2025-04-28 NOTE — ANESTHESIA PROCEDURE NOTES
Intubation    Date/Time: 4/28/2025 8:41 AM    Performed by: Ming Jeter CRNA  Authorized by: Ming Jeter CRNA    Intubation:     Induction:  Intravenous    Intubated:  Postinduction    Mask Ventilation:  Easy mask    Attempts:  1    Attempted By:  CRNA    Method of Intubation:  Direct    Blade:  Duncan 2    Laryngeal View Grade: Grade I - full view of cords      Difficult Airway Encountered?: No      Complications:  None    Airway Device:  Oral endotracheal tube    Airway Device Size:  7.5    Style/Cuff Inflation:  Cuffed (inflated to minimal occlusive pressure)    Tube secured:  22    Secured at:  The lips    Placement Verified By:  Capnometry    Complicating Factors:  None    Findings Post-Intubation:  BS equal bilateral and atraumatic/condition of teeth unchanged

## 2025-04-28 NOTE — TRANSFER OF CARE
Anesthesia Transfer of Care Note    Patient: Mena Mcdaniel    Procedure(s) Performed: Procedure(s) (LRB):  HEMORRHOIDECTOMY (N/A)    Patient location: PACU    Anesthesia Type: general    Transport from OR: Transported from OR on room air with adequate spontaneous ventilation    Post pain: adequate analgesia    Post assessment: no apparent anesthetic complications    Post vital signs: stable    Level of consciousness: awake, alert and oriented    Nausea/Vomiting: no nausea/vomiting    Complications: none    Transfer of care protocol was followed      Last vitals:     /64  P 86  R 18  T 98  O2 Sat 94%

## 2025-04-29 NOTE — ANESTHESIA POSTPROCEDURE EVALUATION
Anesthesia Post Evaluation    Patient: Mena Mcdaniel    Procedure(s) Performed: Procedure(s) (LRB):  HEMORRHOIDECTOMY (N/A)    Final Anesthesia Type: general      Patient location during evaluation: PACU  Patient participation: Yes- Able to Participate  Level of consciousness: awake and alert and oriented  Post-procedure vital signs: reviewed and stable  Pain management: adequate  Airway patency: patent    PONV status at discharge: No PONV  Anesthetic complications: no      Cardiovascular status: blood pressure returned to baseline  Respiratory status: spontaneous ventilation, unassisted and room air  Hydration status: euvolemic  Follow-up not needed.              Vitals Value Taken Time   /99 04/28/25 10:52   Temp 36.4 °C (97.6 °F) 04/28/25 10:52   Pulse 86 04/28/25 10:52   Resp 20 04/28/25 11:36   SpO2 95 % 04/28/25 10:52         Event Time   Out of Recovery 10:49:52         Pain/Fatmata Score: Pain Rating Prior to Med Admin: 8 (4/28/2025 11:36 AM)  Fatmata Score: 10 (4/28/2025 11:12 AM)

## 2025-04-30 LAB — VIEW PATHOLOGY REPORT (RELIAPATH): NORMAL

## 2025-05-13 ENCOUNTER — OFFICE VISIT (OUTPATIENT)
Dept: SURGERY | Facility: CLINIC | Age: 50
End: 2025-05-13
Payer: MEDICAID

## 2025-05-13 VITALS
HEIGHT: 65 IN | BODY MASS INDEX: 40.02 KG/M2 | WEIGHT: 240.19 LBS | DIASTOLIC BLOOD PRESSURE: 88 MMHG | RESPIRATION RATE: 18 BRPM | HEART RATE: 105 BPM | SYSTOLIC BLOOD PRESSURE: 127 MMHG | OXYGEN SATURATION: 96 %

## 2025-05-13 DIAGNOSIS — G89.18 POST-OPERATIVE PAIN: ICD-10-CM

## 2025-05-13 DIAGNOSIS — Z98.890 S/P HEMORRHOIDECTOMY: Primary | ICD-10-CM

## 2025-05-13 DIAGNOSIS — Z87.19 S/P HEMORRHOIDECTOMY: Primary | ICD-10-CM

## 2025-05-13 PROCEDURE — 3074F SYST BP LT 130 MM HG: CPT | Mod: CPTII,,, | Performed by: STUDENT IN AN ORGANIZED HEALTH CARE EDUCATION/TRAINING PROGRAM

## 2025-05-13 PROCEDURE — 99213 OFFICE O/P EST LOW 20 MIN: CPT | Mod: PBBFAC | Performed by: STUDENT IN AN ORGANIZED HEALTH CARE EDUCATION/TRAINING PROGRAM

## 2025-05-13 PROCEDURE — 99024 POSTOP FOLLOW-UP VISIT: CPT | Mod: ,,, | Performed by: STUDENT IN AN ORGANIZED HEALTH CARE EDUCATION/TRAINING PROGRAM

## 2025-05-13 PROCEDURE — 3079F DIAST BP 80-89 MM HG: CPT | Mod: CPTII,,, | Performed by: STUDENT IN AN ORGANIZED HEALTH CARE EDUCATION/TRAINING PROGRAM

## 2025-05-13 PROCEDURE — 99999 PR PBB SHADOW E&M-EST. PATIENT-LVL III: CPT | Mod: PBBFAC,,, | Performed by: STUDENT IN AN ORGANIZED HEALTH CARE EDUCATION/TRAINING PROGRAM

## 2025-05-14 RX ORDER — METHOCARBAMOL 750 MG/1
750 TABLET, FILM COATED ORAL 4 TIMES DAILY
Qty: 40 TABLET | Refills: 0 | Status: SHIPPED | OUTPATIENT
Start: 2025-05-14 | End: 2025-05-24

## 2025-05-14 RX ORDER — TRAMADOL HYDROCHLORIDE 50 MG/1
50 TABLET, FILM COATED ORAL EVERY 6 HOURS
Qty: 28 TABLET | Refills: 0 | Status: SHIPPED | OUTPATIENT
Start: 2025-05-14 | End: 2025-05-14

## 2025-05-14 RX ORDER — HYDROCODONE BITARTRATE AND ACETAMINOPHEN 7.5; 325 MG/1; MG/1
1 TABLET ORAL EVERY 6 HOURS PRN
Qty: 28 TABLET | Refills: 0 | Status: SHIPPED | OUTPATIENT
Start: 2025-05-14 | End: 2025-05-21

## 2025-05-28 ENCOUNTER — HOSPITAL ENCOUNTER (EMERGENCY)
Facility: HOSPITAL | Age: 50
Discharge: HOME | End: 2025-05-28
Attending: EMERGENCY MEDICINE
Payer: MEDICAID

## 2025-05-28 VITALS
BODY MASS INDEX: 39.89 KG/M2 | WEIGHT: 239.44 LBS | HEIGHT: 65 IN | SYSTOLIC BLOOD PRESSURE: 132 MMHG | HEART RATE: 89 BPM | RESPIRATION RATE: 16 BRPM | TEMPERATURE: 98 F | DIASTOLIC BLOOD PRESSURE: 74 MMHG | OXYGEN SATURATION: 98 %
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
## 2025-05-28 PROCEDURE — 25000003 PHARM REV CODE 250: Performed by: EMERGENCY MEDICINE

## 2025-05-28 PROCEDURE — 99283 EMERGENCY DEPT VISIT LOW MDM: CPT

## 2025-05-28 RX ORDER — NAPROXEN 500 MG/1
500 TABLET ORAL
Status: COMPLETED | OUTPATIENT
Start: 2025-05-28 | End: 2025-05-28

## 2025-05-28 RX ORDER — DOXYCYCLINE 100 MG/1
100 CAPSULE ORAL 2 TIMES DAILY
Qty: 14 CAPSULE | Refills: 0 | Status: SHIPPED | OUTPATIENT
Start: 2025-05-28 | End: 2025-06-04

## 2025-05-28 RX ORDER — DOXYCYCLINE HYCLATE 100 MG
100 TABLET ORAL
Status: COMPLETED | OUTPATIENT
Start: 2025-05-28 | End: 2025-05-28

## 2025-05-28 RX ADMIN — NAPROXEN 500 MG: 500 TABLET ORAL at 02:05

## 2025-05-28 RX ADMIN — DOXYCYCLINE HYCLATE 100 MG: 100 TABLET, COATED ORAL at 02:05

## 2025-05-28 NOTE — PROGRESS NOTES
"[facility] Progress Note        Subjective: Pt is a 50 y.o. female s/p hemorrhoidectomy who presents for follow up.  Forgot post op instructions and has been using mag citrate to make bowel movements soft.  Having daily bowel movements with scant bleeding.       ROS: negative save HPI    PE:  /88   Pulse 105   Resp 18   Ht 5' 5" (1.651 m)   Wt 109 kg (240 lb 3.2 oz)   LMP 01/01/2012 Comment: hysterectomy  SpO2 96%   BMI 39.97 kg/m²   Wt Readings from Last 2 Encounters:   05/28/25 108.6 kg (239 lb 6.7 oz)   05/13/25 109 kg (240 lb 3.2 oz)       Gen: Alert and oriented x3, judgement intact, NAD  CV: RRR  Resp: CTAB; breaths non-labored and symmetrical  Abd: Soft, NT, ND, BS+  Extremities: No c/c/e         Pathology:       A/P: Pt is a 50 y.o. female s/p excisional hemorrhoidectomy who presents for  follow up  -Bowel regimen given   -Continue lifting restrictions   -Refills given   -RTC 2 weeks       Rajwinder Mcdermott MD  [phone]      "

## 2025-05-29 ENCOUNTER — OFFICE VISIT (OUTPATIENT)
Dept: SURGERY | Facility: CLINIC | Age: 50
End: 2025-05-29
Payer: MEDICAID

## 2025-05-29 VITALS
WEIGHT: 240.63 LBS | BODY MASS INDEX: 40.09 KG/M2 | HEART RATE: 83 BPM | SYSTOLIC BLOOD PRESSURE: 161 MMHG | HEIGHT: 65 IN | OXYGEN SATURATION: 98 % | RESPIRATION RATE: 18 BRPM | DIASTOLIC BLOOD PRESSURE: 70 MMHG

## 2025-05-29 DIAGNOSIS — Z98.890 S/P HEMORRHOIDECTOMY: Primary | ICD-10-CM

## 2025-05-29 DIAGNOSIS — Z87.19 S/P HEMORRHOIDECTOMY: Primary | ICD-10-CM

## 2025-05-29 PROCEDURE — 99999 PR PBB SHADOW E&M-EST. PATIENT-LVL III: CPT | Mod: PBBFAC,,, | Performed by: STUDENT IN AN ORGANIZED HEALTH CARE EDUCATION/TRAINING PROGRAM

## 2025-05-29 PROCEDURE — 3077F SYST BP >= 140 MM HG: CPT | Mod: CPTII,,, | Performed by: STUDENT IN AN ORGANIZED HEALTH CARE EDUCATION/TRAINING PROGRAM

## 2025-05-29 PROCEDURE — 1159F MED LIST DOCD IN RCRD: CPT | Mod: CPTII,,, | Performed by: STUDENT IN AN ORGANIZED HEALTH CARE EDUCATION/TRAINING PROGRAM

## 2025-05-29 PROCEDURE — 3078F DIAST BP <80 MM HG: CPT | Mod: CPTII,,, | Performed by: STUDENT IN AN ORGANIZED HEALTH CARE EDUCATION/TRAINING PROGRAM

## 2025-05-29 PROCEDURE — 99213 OFFICE O/P EST LOW 20 MIN: CPT | Mod: PBBFAC | Performed by: STUDENT IN AN ORGANIZED HEALTH CARE EDUCATION/TRAINING PROGRAM

## 2025-05-29 PROCEDURE — 99024 POSTOP FOLLOW-UP VISIT: CPT | Mod: ,,, | Performed by: STUDENT IN AN ORGANIZED HEALTH CARE EDUCATION/TRAINING PROGRAM

## 2025-06-01 NOTE — PROGRESS NOTES
"[facility] Progress Note        Subjective: Pt is a 50 y.o. female s/p hemorrhoidectomy who presents for follow up.  Pain much improved.  Complaint with bowel regimen.  Having daily easy bowel movements.  Says suture fell out earlier this week.       ROS: negative save HPI    PE:  BP (!) 161/70   Pulse 83   Resp 18   Ht 5' 5" (1.651 m)   Wt 109.1 kg (240 lb 9.6 oz)   LMP 01/01/2012 Comment: hysterectomy  SpO2 98%   BMI 40.04 kg/m²   Wt Readings from Last 2 Encounters:   05/29/25 109.1 kg (240 lb 9.6 oz)   05/28/25 108.6 kg (239 lb 6.7 oz)       Gen: Alert and oriented x3, judgement intact, NAD  CV: RRR  Resp: CTAB; breaths non-labored and symmetrical  Abd: Soft, NT, ND, BS+  Extremities: No c/c/e  : Well approximated incisions at anoderm.  No erythema or drainage         Pathology:       A/P: Pt is a 50 y.o. female s/p excisional hemorrhoidectomy who presents for  follow up  -Continue bowel regimen  -RTC 3 months        Rajwinder Mcdermott MD  [phone]        "

## 2025-06-23 DIAGNOSIS — F33.41 RECURRENT MAJOR DEPRESSIVE DISORDER, IN PARTIAL REMISSION: ICD-10-CM

## 2025-06-23 DIAGNOSIS — E89.40 SURGICAL MENOPAUSE ON HORMONE REPLACEMENT THERAPY: ICD-10-CM

## 2025-06-23 DIAGNOSIS — F41.1 GENERALIZED ANXIETY DISORDER: ICD-10-CM

## 2025-06-23 DIAGNOSIS — Z79.890 SURGICAL MENOPAUSE ON HORMONE REPLACEMENT THERAPY: ICD-10-CM

## 2025-06-23 RX ORDER — ESTRADIOL 2 MG/1
TABLET ORAL
Qty: 30 TABLET | Refills: 5 | Status: SHIPPED | OUTPATIENT
Start: 2025-06-23

## 2025-06-23 RX ORDER — ESCITALOPRAM OXALATE 20 MG/1
20 TABLET ORAL DAILY
Qty: 30 TABLET | Refills: 5 | Status: SHIPPED | OUTPATIENT
Start: 2025-06-23

## 2025-07-17 ENCOUNTER — OFFICE VISIT (OUTPATIENT)
Dept: PRIMARY CARE CLINIC | Facility: CLINIC | Age: 50
End: 2025-07-17
Payer: MEDICAID

## 2025-07-17 ENCOUNTER — LAB VISIT (OUTPATIENT)
Dept: LAB | Facility: HOSPITAL | Age: 50
End: 2025-07-17
Attending: NURSE PRACTITIONER
Payer: MEDICAID

## 2025-07-17 ENCOUNTER — HOSPITAL ENCOUNTER (OUTPATIENT)
Dept: PULMONOLOGY | Facility: HOSPITAL | Age: 50
Discharge: HOME | End: 2025-07-17
Attending: NURSE PRACTITIONER
Payer: MEDICAID

## 2025-07-17 VITALS
OXYGEN SATURATION: 94 % | WEIGHT: 243.31 LBS | HEIGHT: 65 IN | BODY MASS INDEX: 40.54 KG/M2 | DIASTOLIC BLOOD PRESSURE: 70 MMHG | HEART RATE: 91 BPM | SYSTOLIC BLOOD PRESSURE: 115 MMHG

## 2025-07-17 DIAGNOSIS — Z01.818 PRE-OP TESTING: ICD-10-CM

## 2025-07-17 DIAGNOSIS — Z13.220 NEED FOR LIPID SCREENING: ICD-10-CM

## 2025-07-17 DIAGNOSIS — Z13.1 SCREENING FOR DIABETES MELLITUS (DM): ICD-10-CM

## 2025-07-17 DIAGNOSIS — Z11.59 ENCOUNTER FOR HEPATITIS C SCREENING TEST FOR LOW RISK PATIENT: ICD-10-CM

## 2025-07-17 DIAGNOSIS — Z76.89 ENCOUNTER TO ESTABLISH CARE: Primary | ICD-10-CM

## 2025-07-17 DIAGNOSIS — Z13.21 ENCOUNTER FOR VITAMIN DEFICIENCY SCREENING: ICD-10-CM

## 2025-07-17 DIAGNOSIS — Z11.4 ENCOUNTER FOR SCREENING FOR HIV: ICD-10-CM

## 2025-07-17 DIAGNOSIS — Z13.0 SCREENING FOR IRON DEFICIENCY ANEMIA: ICD-10-CM

## 2025-07-17 DIAGNOSIS — Z80.0 FAMILY HISTORY OF COLON CANCER: ICD-10-CM

## 2025-07-17 DIAGNOSIS — Z90.710 HISTORY OF TOTAL ABDOMINAL HYSTERECTOMY: ICD-10-CM

## 2025-07-17 DIAGNOSIS — Z12.31 ENCOUNTER FOR SCREENING MAMMOGRAM FOR MALIGNANT NEOPLASM OF BREAST: ICD-10-CM

## 2025-07-17 DIAGNOSIS — F17.210 CIGARETTE NICOTINE DEPENDENCE WITHOUT COMPLICATION: ICD-10-CM

## 2025-07-17 DIAGNOSIS — Z23 NEED FOR PNEUMOCOCCAL 20-VALENT CONJUGATE VACCINATION: ICD-10-CM

## 2025-07-17 DIAGNOSIS — Z13.29 THYROID DISORDER SCREENING: ICD-10-CM

## 2025-07-17 PROBLEM — E83.51 HYPOCALCEMIA: Status: ACTIVE | Noted: 2025-07-17

## 2025-07-17 LAB
25(OH)D3+25(OH)D2 SERPL-MCNC: 29 NG/ML (ref 30–96)
ABSOLUTE EOSINOPHIL (OHS): 0.26 K/UL
ABSOLUTE MONOCYTE (OHS): 0.58 K/UL (ref 0.3–1)
ABSOLUTE NEUTROPHIL COUNT (OHS): 3.76 K/UL (ref 1.8–7.7)
ALBUMIN SERPL BCP-MCNC: 3.7 G/DL (ref 3.5–5.2)
ALBUMIN/CREAT UR: 6.1 UG/MG
ALP SERPL-CCNC: 88 UNIT/L (ref 40–150)
ALT SERPL W/O P-5'-P-CCNC: 13 UNIT/L (ref 10–44)
ANION GAP (OHS): 10 MMOL/L (ref 8–16)
AST SERPL-CCNC: 18 UNIT/L (ref 11–45)
BASOPHILS # BLD AUTO: 0.1 K/UL
BASOPHILS NFR BLD AUTO: 1.4 %
BILIRUB SERPL-MCNC: 0.2 MG/DL (ref 0.1–1)
BUN SERPL-MCNC: 8 MG/DL (ref 6–20)
CALCIUM SERPL-MCNC: 8.5 MG/DL (ref 8.7–10.5)
CHLORIDE SERPL-SCNC: 105 MMOL/L (ref 95–110)
CHOLEST SERPL-MCNC: 215 MG/DL (ref 120–199)
CHOLEST/HDLC SERPL: 4.1 {RATIO} (ref 2–5)
CO2 SERPL-SCNC: 25 MMOL/L (ref 23–29)
CREAT SERPL-MCNC: 0.7 MG/DL (ref 0.5–1.4)
CREAT UR-MCNC: 214.1 MG/DL (ref 15–325)
EAG (OHS): 103 MG/DL (ref 68–131)
ERYTHROCYTE [DISTWIDTH] IN BLOOD BY AUTOMATED COUNT: 13.1 % (ref 11.5–14.5)
GFR SERPLBLD CREATININE-BSD FMLA CKD-EPI: >60 ML/MIN/1.73/M2
GLUCOSE SERPL-MCNC: 124 MG/DL (ref 70–110)
HBA1C MFR BLD: 5.2 % (ref 4–5.6)
HCT VFR BLD AUTO: 37.3 % (ref 37–48.5)
HCV AB SERPL QL IA: NORMAL
HDLC SERPL-MCNC: 53 MG/DL (ref 40–75)
HDLC SERPL: 24.7 % (ref 20–50)
HGB BLD-MCNC: 12.7 GM/DL (ref 12–16)
HIV 1+2 AB+HIV1 P24 AG SERPL QL IA: NORMAL
IMM GRANULOCYTES # BLD AUTO: 0.03 K/UL (ref 0–0.04)
IMM GRANULOCYTES NFR BLD AUTO: 0.4 % (ref 0–0.5)
LDLC SERPL CALC-MCNC: 121 MG/DL (ref 63–159)
LYMPHOCYTES # BLD AUTO: 2.28 K/UL (ref 1–4.8)
MCH RBC QN AUTO: 31.2 PG (ref 27–31)
MCHC RBC AUTO-ENTMCNC: 34 G/DL (ref 32–36)
MCV RBC AUTO: 92 FL (ref 82–98)
MICROALBUMIN UR-MCNC: 13 UG/ML (ref ?–5000)
NONHDLC SERPL-MCNC: 162 MG/DL
NUCLEATED RBC (/100WBC) (OHS): 0 /100 WBC
OHS QRS DURATION: 76 MS
OHS QTC CALCULATION: 434 MS
PLATELET # BLD AUTO: 280 K/UL (ref 150–450)
PMV BLD AUTO: 8.7 FL (ref 9.2–12.9)
POTASSIUM SERPL-SCNC: 3.8 MMOL/L (ref 3.5–5.1)
PROT SERPL-MCNC: 7.6 GM/DL (ref 6–8.4)
RBC # BLD AUTO: 4.07 M/UL (ref 4–5.4)
RELATIVE EOSINOPHIL (OHS): 3.7 %
RELATIVE LYMPHOCYTE (OHS): 32.5 % (ref 18–48)
RELATIVE MONOCYTE (OHS): 8.3 % (ref 4–15)
RELATIVE NEUTROPHIL (OHS): 53.7 % (ref 38–73)
SODIUM SERPL-SCNC: 140 MMOL/L (ref 136–145)
TRIGL SERPL-MCNC: 205 MG/DL (ref 30–150)
TSH SERPL-ACNC: 1.37 UIU/ML (ref 0.4–4)
WBC # BLD AUTO: 7.01 K/UL (ref 3.9–12.7)

## 2025-07-17 PROCEDURE — 86803 HEPATITIS C AB TEST: CPT

## 2025-07-17 PROCEDURE — 84460 ALANINE AMINO (ALT) (SGPT): CPT

## 2025-07-17 PROCEDURE — 82043 UR ALBUMIN QUANTITATIVE: CPT

## 2025-07-17 PROCEDURE — 99999 PR PBB SHADOW E&M-EST. PATIENT-LVL III: CPT | Mod: PBBFAC,,, | Performed by: NURSE PRACTITIONER

## 2025-07-17 PROCEDURE — 87389 HIV-1 AG W/HIV-1&-2 AB AG IA: CPT

## 2025-07-17 PROCEDURE — 85025 COMPLETE CBC W/AUTO DIFF WBC: CPT

## 2025-07-17 PROCEDURE — 90677 PCV20 VACCINE IM: CPT | Mod: PBBFAC

## 2025-07-17 PROCEDURE — 93005 ELECTROCARDIOGRAM TRACING: CPT

## 2025-07-17 PROCEDURE — 82306 VITAMIN D 25 HYDROXY: CPT

## 2025-07-17 PROCEDURE — 82465 ASSAY BLD/SERUM CHOLESTEROL: CPT

## 2025-07-17 PROCEDURE — 99213 OFFICE O/P EST LOW 20 MIN: CPT | Mod: PBBFAC,25 | Performed by: NURSE PRACTITIONER

## 2025-07-17 PROCEDURE — 36415 COLL VENOUS BLD VENIPUNCTURE: CPT

## 2025-07-17 PROCEDURE — 84443 ASSAY THYROID STIM HORMONE: CPT

## 2025-07-17 PROCEDURE — 83036 HEMOGLOBIN GLYCOSYLATED A1C: CPT

## 2025-07-17 PROCEDURE — 93010 ELECTROCARDIOGRAM REPORT: CPT | Mod: ,,, | Performed by: STUDENT IN AN ORGANIZED HEALTH CARE EDUCATION/TRAINING PROGRAM

## 2025-07-17 PROCEDURE — 99999PBSHW PR PBB SHADOW TECHNICAL ONLY FILED TO HB: Mod: PBBFAC,,,

## 2025-07-17 PROCEDURE — 90471 IMMUNIZATION ADMIN: CPT | Mod: PBBFAC

## 2025-07-17 RX ADMIN — PNEUMOCOCCAL 20-VALENT CONJUGATE VACCINE 0.5 ML
2.2; 2.2; 2.2; 2.2; 2.2; 2.2; 2.2; 2.2; 2.2; 2.2; 2.2; 2.2; 2.2; 2.2; 2.2; 2.2; 4.4; 2.2; 2.2; 2.2 INJECTION, SUSPENSION INTRAMUSCULAR at 02:07

## 2025-07-17 NOTE — PROGRESS NOTES
[facility] Note    HPI:  Mena Mcdaniel is a 50 y.o. female who presents today for Establish Care (Pt here to est care/ needs clearance for foot surgery)     Having surgery 8/1/25 for right double heel due to injury 1 year ago    Review of Systems   Constitutional: Negative.    HENT: Negative.     Eyes: Negative.    Respiratory: Negative.     Cardiovascular: Negative.    Gastrointestinal: Negative.    Genitourinary: Negative.    Musculoskeletal:  Positive for joint pain (right foot).   Skin: Negative.    Neurological: Negative.    Endo/Heme/Allergies: Negative.    Psychiatric/Behavioral: Negative.        A review of systems was performed and was negative except as noted above.    I personally reviewed allergies, past medical, surgical, social and family history and updated as appropriate.    Medications:  Current Medications[1]     Health Maintenance:  Immunization History   Administered Date(s) Administered    COVID-19, MRNA, LN-S, PF (MODERNA FULL 0.5 ML DOSE) 06/07/2021, 07/06/2021    Tdap 12/23/2021      Health Maintenance   Topic Date Due    Hepatitis C Screening  Never done    Lipid Panel  Never done    HIV Screening  Never done    Pneumococcal Vaccines (Age 0-49) (1 of 2 - PCV) Never done    Hemoglobin A1c (Diabetic Prevention Screening)  Never done    Mammogram  12/13/2024    COVID-19 Vaccine (3 - 2024-25 season) 07/17/2026 (Originally 9/1/2024)    Influenza Vaccine (1) 09/01/2025    TETANUS VACCINE  12/23/2031    Colorectal Cancer Screening  02/10/2035    RSV Vaccine (Age 60+ and Pregnant patients) (1 - 1-dose 75+ series) 04/23/2052     Health Maintenance Topics with due status: Not Due       Topic Last Completion Date    TETANUS VACCINE 12/23/2021    Colorectal Cancer Screening 02/10/2025    Influenza Vaccine Not Due    RSV Vaccine (Age 60+ and Pregnant patients) Not Due     Health Maintenance Due   Topic Date Due    Hepatitis C Screening  Never done    Lipid Panel  Never done     "HIV Screening  Never done    Pneumococcal Vaccines (Age 0-49) (1 of 2 - PCV) Never done    Hemoglobin A1c (Diabetic Prevention Screening)  Never done    Mammogram  12/13/2024       PHYSICAL EXAM:  Vitals:    07/17/25 1345   BP: 115/70   BP Location: Left arm   Patient Position: Sitting   Pulse: 91   SpO2: (!) 94%   Weight: 110.4 kg (243 lb 4.8 oz)   Height: 5' 5" (1.651 m)     Body mass index is 40.49 kg/m².  Physical Exam  Constitutional:       Appearance: Normal appearance. She is normal weight.   HENT:      Head: Normocephalic.      Right Ear: Tympanic membrane, ear canal and external ear normal.      Left Ear: Tympanic membrane, ear canal and external ear normal.      Nose: Nose normal.      Mouth/Throat:      Mouth: Mucous membranes are moist.   Cardiovascular:      Rate and Rhythm: Normal rate and regular rhythm.      Heart sounds: Normal heart sounds.   Pulmonary:      Effort: Pulmonary effort is normal.      Breath sounds: Normal breath sounds.   Musculoskeletal:         General: Tenderness (right foot) present. Normal range of motion.      Cervical back: Normal range of motion.   Skin:     General: Skin is warm and dry.   Neurological:      General: No focal deficit present.      Mental Status: She is alert and oriented to person, place, and time.          ASSESSMENT/PLAN:  1. Encounter to establish care    2. Screening for diabetes mellitus (DM)  -     Comprehensive Metabolic Panel; Future; Expected date: 07/17/2025  -     Hemoglobin A1C; Future; Expected date: 07/17/2025  -     Microalbumin/Creatinine Ratio, Urine; Future; Expected date: 07/17/2025    3. Screening for iron deficiency anemia  -     CBC Auto Differential; Future; Expected date: 07/17/2025    4. Thyroid disorder screening  -     TSH; Future; Expected date: 07/17/2025    5. Need for lipid screening  -     Lipid Panel; Future; Expected date: 07/17/2025    6. Encounter for screening for HIV  -     HIV 1/2 Ag/Ab (4th Gen); Future; Expected date: " 07/17/2025    7. Encounter for vitamin deficiency screening  -     Vitamin D; Future; Expected date: 07/17/2025    8. Encounter for hepatitis C screening test for low risk patient  -     Hepatitis C Antibody; Future; Expected date: 07/17/2025    9. Encounter for screening mammogram for malignant neoplasm of breast  -     Mammo Digital Screening Bilat w/ Huy (XPD); Future; Expected date: 07/17/2025    10. Pre-op testing  -     EKG 12-lead; Future        Other than changes above, continue current medications and maintain follow up with specialists.      No follow-ups on file.   Recent Results (from the past 12 weeks)   Specimen to Pathology General Surgery    Collection Time: 04/28/25  9:34 AM   Result Value Ref Range    View [facility] Pathology Report See Scanned [facility] Report          Marcia Blereau, FNP [facility]                       [1]   Current Outpatient Medications:     clonazePAM (KLONOPIN) 1 MG tablet, TAKE ONE TABLET BY MOUTH TWICE A DAY **THANK YOU**, Disp: 60 tablet, Rfl: 1    cyclobenzaprine (FEXMID) 7.5 MG Tab, Take 10 mg by mouth 3 (three) times daily as needed., Disp: , Rfl:     EScitalopram oxalate (LEXAPRO) 20 MG tablet, Take 1 tablet (20 mg total) by mouth once daily., Disp: 30 tablet, Rfl: 5    estradioL (ESTRACE) 2 MG tablet, TAKE ONE TABLET BY MOUTH ONCE DAILY **THANK YOU**, Disp: 30 tablet, Rfl: 5    gabapentin (NEURONTIN) 100 MG capsule, Take 100 mg by mouth 3 (three) times daily., Disp: , Rfl:

## 2025-07-21 ENCOUNTER — HOSPITAL ENCOUNTER (OUTPATIENT)
Dept: RADIOLOGY | Facility: HOSPITAL | Age: 50
Discharge: HOME | End: 2025-07-21
Attending: NURSE PRACTITIONER
Payer: MEDICAID

## 2025-07-21 VITALS — BODY MASS INDEX: 40.48 KG/M2 | HEIGHT: 65 IN | WEIGHT: 243 LBS

## 2025-07-21 DIAGNOSIS — Z12.31 ENCOUNTER FOR SCREENING MAMMOGRAM FOR MALIGNANT NEOPLASM OF BREAST: ICD-10-CM

## 2025-07-21 PROCEDURE — 77067 SCR MAMMO BI INCL CAD: CPT | Mod: TC

## 2025-07-29 DIAGNOSIS — M77.31 CALCANEAL SPUR OF RIGHT FOOT: Primary | ICD-10-CM

## 2025-07-29 DIAGNOSIS — M76.61 RIGHT ACHILLES TENDINITIS: ICD-10-CM

## 2025-07-29 RX ORDER — PREGABALIN 100 MG/1
100 CAPSULE ORAL
COMMUNITY
Start: 2025-07-21

## 2025-07-29 RX ORDER — PREGABALIN 50 MG/1
50 CAPSULE ORAL 2 TIMES DAILY
COMMUNITY
Start: 2025-07-21

## 2025-07-30 ENCOUNTER — ANESTHESIA EVENT (OUTPATIENT)
Dept: SURGERY | Facility: HOSPITAL | Age: 50
End: 2025-07-30
Payer: MEDICAID

## 2025-07-31 NOTE — ANESTHESIA PREPROCEDURE EVALUATION
07/31/2025  Mena Mcdaniel is a 50 y.o., female.      Pre-op Assessment    I have reviewed the Patient Summary Reports.    I have reviewed the NPO Status.   I have reviewed the Medications.     Review of Systems  Anesthesia Hx:  No problems with previous Anesthesia             Denies Family Hx of Anesthesia complications.    Denies Personal Hx of Anesthesia complications.                    Social:  Smoker       Cardiovascular:  Cardiovascular Normal                  ECG has been reviewed.                            Pulmonary:  Pulmonary Normal                       Renal/:  Renal/ Normal                 Hepatic/GI:  Hepatic/GI Normal                    Neurological:  Neurology Normal                                      Endocrine:  Endocrine Normal            Psych:  Psychiatric History anxiety depression              Lab Results   Component Value Date    WBC 7.01 07/17/2025    HGB 12.7 07/17/2025    HCT 37.3 07/17/2025    MCV 92 07/17/2025     07/17/2025      CMP  Sodium   Date Value Ref Range Status   07/17/2025 140 136 - 145 mmol/L Final   02/03/2025 135 (L) 136 - 145 mmol/L Final     Potassium   Date Value Ref Range Status   07/17/2025 3.8 3.5 - 5.1 mmol/L Final   02/03/2025 4.1 3.5 - 5.1 mmol/L Final     Chloride   Date Value Ref Range Status   07/17/2025 105 95 - 110 mmol/L Final   02/03/2025 102 95 - 110 mmol/L Final     CO2   Date Value Ref Range Status   07/17/2025 25 23 - 29 mmol/L Final   02/03/2025 26 23 - 29 mmol/L Final     Glucose   Date Value Ref Range Status   07/17/2025 124 (H) 70 - 110 mg/dL Final   02/03/2025 140 (H) 70 - 110 mg/dL Final     BUN   Date Value Ref Range Status   07/17/2025 8 6 - 20 mg/dL Final     Creatinine   Date Value Ref Range Status   07/17/2025 0.7 0.5 - 1.4 mg/dL Final     Calcium   Date Value Ref Range Status   07/17/2025 8.5 (L) 8.7 - 10.5 mg/dL  Final   02/03/2025 8.4 (L) 8.7 - 10.5 mg/dL Final     Protein Total   Date Value Ref Range Status   07/17/2025 7.6 6.0 - 8.4 gm/dL Final     Total Protein   Date Value Ref Range Status   02/03/2025 7.0 6.0 - 8.4 g/dL Final     Albumin   Date Value Ref Range Status   07/17/2025 3.7 3.5 - 5.2 g/dL Final   02/03/2025 3.5 3.5 - 5.2 g/dL Final     Total Bilirubin   Date Value Ref Range Status   02/03/2025 0.2 0.1 - 1.0 mg/dL Final     Comment:     For infants and newborns, interpretation of results should be based  on gestational age, weight and in agreement with clinical  observations.    Premature Infant recommended reference ranges:  Up to 24 hours.............<8.0 mg/dL  Up to 48 hours............<12.0 mg/dL  3-5 days..................<15.0 mg/dL  6-29 days.................<15.0 mg/dL       Bilirubin Total   Date Value Ref Range Status   07/17/2025 0.2 0.1 - 1.0 mg/dL Final     Comment:     For infants and newborns, interpretation of results should be based   on gestational age, weight and in agreement with clinical   observations.    Premature Infant recommended reference ranges:   0-24 hours:  <8.0 mg/dL   24-48 hours: <12.0 mg/dL   3-5 days:    <15.0 mg/dL   6-29 days:   <15.0 mg/dL     Alkaline Phosphatase   Date Value Ref Range Status   02/03/2025 74 55 - 135 U/L Final     ALP   Date Value Ref Range Status   07/17/2025 88 40 - 150 unit/L Final     AST   Date Value Ref Range Status   07/17/2025 18 11 - 45 unit/L Final   02/03/2025 19 10 - 40 U/L Final     ALT   Date Value Ref Range Status   07/17/2025 13 10 - 44 unit/L Final   02/03/2025 14 10 - 44 U/L Final     Anion Gap   Date Value Ref Range Status   07/17/2025 10 8 - 16 mmol/L Final     eGFR   Date Value Ref Range Status   07/17/2025 >60 >60 mL/min/1.73/m2 Final     Comment:     Estimated GFR calculated using the CKD-EPI creatinine (2021) equation.   02/03/2025 >60.0 >60 mL/min/1.73 m^2 Final        Physical Exam  General: Well  nourished    Airway:  Mallampati: II / I  Mouth Opening: Normal  TM Distance: Normal  Tongue: Normal  Neck ROM: Normal ROM    Dental:  Dentures    Chest/Lungs:  Clear to auscultation    Heart:  Rate: Normal  Rhythm: Regular Rhythm  Sounds: Normal        Anesthesia Plan  Type of Anesthesia, risks & benefits discussed:    Anesthesia Type: Gen ETT, Gen Supraglottic Airway  Intra-op Monitoring Plan: Standard ASA Monitors  Post Op Pain Control Plan: multimodal analgesia  Induction:  IV  Airway Plan: Direct  Informed Consent: Informed consent signed with the Patient and all parties understand the risks and agree with anesthesia plan.  All questions answered.   ASA Score: 2  Day of Surgery Review of History & Physical: I have interviewed and examined the patient. I have reviewed the patient's H&P dated: There are no significant changes.     Ready For Surgery From Anesthesia Perspective.     .

## 2025-08-01 ENCOUNTER — HOSPITAL ENCOUNTER (OUTPATIENT)
Facility: HOSPITAL | Age: 50
Discharge: HOME | End: 2025-08-01
Attending: PODIATRIST | Admitting: PODIATRIST
Payer: MEDICAID

## 2025-08-01 ENCOUNTER — ANESTHESIA (OUTPATIENT)
Dept: SURGERY | Facility: HOSPITAL | Age: 50
End: 2025-08-01
Payer: MEDICAID

## 2025-08-01 DIAGNOSIS — M77.31 CALCANEAL SPUR OF RIGHT FOOT: ICD-10-CM

## 2025-08-01 PROCEDURE — 25000003 PHARM REV CODE 250: Performed by: PODIATRIST

## 2025-08-01 PROCEDURE — 63600175 PHARM REV CODE 636 W HCPCS: Performed by: ANESTHESIOLOGY

## 2025-08-01 PROCEDURE — 63600175 PHARM REV CODE 636 W HCPCS: Performed by: NURSE ANESTHETIST, CERTIFIED REGISTERED

## 2025-08-01 PROCEDURE — 27201423 OPTIME MED/SURG SUP & DEVICES STERILE SUPPLY: Performed by: PODIATRIST

## 2025-08-01 PROCEDURE — 63600175 PHARM REV CODE 636 W HCPCS: Performed by: PODIATRIST

## 2025-08-01 PROCEDURE — 37000008 HC ANESTHESIA 1ST 15 MINUTES: Performed by: PODIATRIST

## 2025-08-01 PROCEDURE — 25000003 PHARM REV CODE 250: Performed by: NURSE ANESTHETIST, CERTIFIED REGISTERED

## 2025-08-01 PROCEDURE — 71000016 HC POSTOP RECOV ADDL HR: Performed by: PODIATRIST

## 2025-08-01 PROCEDURE — 71000015 HC POSTOP RECOV 1ST HR: Performed by: PODIATRIST

## 2025-08-01 PROCEDURE — 37000009 HC ANESTHESIA EA ADD 15 MINS: Performed by: PODIATRIST

## 2025-08-01 PROCEDURE — 71000033 HC RECOVERY, INTIAL HOUR: Performed by: PODIATRIST

## 2025-08-01 PROCEDURE — 36000706: Performed by: PODIATRIST

## 2025-08-01 PROCEDURE — 25000003 PHARM REV CODE 250: Performed by: ANESTHESIOLOGY

## 2025-08-01 PROCEDURE — C1713 ANCHOR/SCREW BN/BN,TIS/BN: HCPCS | Performed by: PODIATRIST

## 2025-08-01 PROCEDURE — 36000707: Performed by: PODIATRIST

## 2025-08-01 DEVICE — SONICANCHOR KIT
Type: IMPLANTABLE DEVICE | Site: FOOT | Status: FUNCTIONAL
Brand: SONICANCHOR

## 2025-08-01 RX ORDER — GLUCAGON 1 MG
1 KIT INJECTION
Status: DISCONTINUED | OUTPATIENT
Start: 2025-08-01 | End: 2025-08-01 | Stop reason: HOSPADM

## 2025-08-01 RX ORDER — DIPHENHYDRAMINE HYDROCHLORIDE 50 MG/ML
25 INJECTION, SOLUTION INTRAMUSCULAR; INTRAVENOUS EVERY 6 HOURS PRN
Status: DISCONTINUED | OUTPATIENT
Start: 2025-08-01 | End: 2025-08-01 | Stop reason: HOSPADM

## 2025-08-01 RX ORDER — BUPIVACAINE HYDROCHLORIDE 5 MG/ML
INJECTION, SOLUTION EPIDURAL; INTRACAUDAL; PERINEURAL
Status: DISCONTINUED | OUTPATIENT
Start: 2025-08-01 | End: 2025-08-01 | Stop reason: HOSPADM

## 2025-08-01 RX ORDER — ONDANSETRON HYDROCHLORIDE 2 MG/ML
4 INJECTION, SOLUTION INTRAVENOUS DAILY PRN
Status: DISCONTINUED | OUTPATIENT
Start: 2025-08-01 | End: 2025-08-01 | Stop reason: HOSPADM

## 2025-08-01 RX ORDER — ONDANSETRON HYDROCHLORIDE 2 MG/ML
INJECTION, SOLUTION INTRAMUSCULAR; INTRAVENOUS
Status: DISCONTINUED | OUTPATIENT
Start: 2025-08-01 | End: 2025-08-01

## 2025-08-01 RX ORDER — ONDANSETRON 4 MG/1
8 TABLET, ORALLY DISINTEGRATING ORAL EVERY 8 HOURS PRN
Status: DISCONTINUED | OUTPATIENT
Start: 2025-08-01 | End: 2025-08-01 | Stop reason: HOSPADM

## 2025-08-01 RX ORDER — ACETAMINOPHEN 10 MG/ML
INJECTION, SOLUTION INTRAVENOUS
Status: DISCONTINUED | OUTPATIENT
Start: 2025-08-01 | End: 2025-08-01

## 2025-08-01 RX ORDER — MIDAZOLAM HYDROCHLORIDE 1 MG/ML
INJECTION INTRAMUSCULAR; INTRAVENOUS
Status: DISCONTINUED | OUTPATIENT
Start: 2025-08-01 | End: 2025-08-01

## 2025-08-01 RX ORDER — SODIUM CHLORIDE 0.9 % (FLUSH) 0.9 %
10 SYRINGE (ML) INJECTION
Status: DISCONTINUED | OUTPATIENT
Start: 2025-08-01 | End: 2025-08-01 | Stop reason: HOSPADM

## 2025-08-01 RX ORDER — SODIUM CHLORIDE 9 MG/ML
INJECTION, SOLUTION INTRAVENOUS CONTINUOUS
Status: DISCONTINUED | OUTPATIENT
Start: 2025-08-01 | End: 2025-08-01 | Stop reason: HOSPADM

## 2025-08-01 RX ORDER — FENTANYL CITRATE 50 UG/ML
INJECTION, SOLUTION INTRAMUSCULAR; INTRAVENOUS
Status: DISCONTINUED | OUTPATIENT
Start: 2025-08-01 | End: 2025-08-01

## 2025-08-01 RX ORDER — HYDROMORPHONE HYDROCHLORIDE 1 MG/ML
0.5 INJECTION, SOLUTION INTRAMUSCULAR; INTRAVENOUS; SUBCUTANEOUS EVERY 5 MIN PRN
Status: DISCONTINUED | OUTPATIENT
Start: 2025-08-01 | End: 2025-08-01 | Stop reason: HOSPADM

## 2025-08-01 RX ORDER — HYDROCODONE BITARTRATE AND ACETAMINOPHEN 5; 325 MG/1; MG/1
1 TABLET ORAL EVERY 4 HOURS PRN
Status: DISCONTINUED | OUTPATIENT
Start: 2025-08-01 | End: 2025-08-01 | Stop reason: HOSPADM

## 2025-08-01 RX ORDER — ROCURONIUM BROMIDE 10 MG/ML
INJECTION, SOLUTION INTRAVENOUS
Status: DISCONTINUED | OUTPATIENT
Start: 2025-08-01 | End: 2025-08-01

## 2025-08-01 RX ORDER — PROPOFOL 10 MG/ML
VIAL (ML) INTRAVENOUS
Status: DISCONTINUED | OUTPATIENT
Start: 2025-08-01 | End: 2025-08-01

## 2025-08-01 RX ADMIN — ACETAMINOPHEN 1000 MG: 10 INJECTION, SOLUTION INTRAVENOUS at 08:08

## 2025-08-01 RX ADMIN — HYDROMORPHONE HYDROCHLORIDE 0.5 MG: 1 INJECTION, SOLUTION INTRAMUSCULAR; INTRAVENOUS; SUBCUTANEOUS at 09:08

## 2025-08-01 RX ADMIN — VANCOMYCIN HYDROCHLORIDE 1000 MG: 1 INJECTION, POWDER, LYOPHILIZED, FOR SOLUTION INTRAVENOUS at 08:08

## 2025-08-01 RX ADMIN — MIDAZOLAM 2 MG: 1 INJECTION INTRAMUSCULAR; INTRAVENOUS at 07:08

## 2025-08-01 RX ADMIN — SUGAMMADEX 200 MG: 100 INJECTION, SOLUTION INTRAVENOUS at 09:08

## 2025-08-01 RX ADMIN — SODIUM CHLORIDE: 9 INJECTION, SOLUTION INTRAVENOUS at 06:08

## 2025-08-01 RX ADMIN — FENTANYL CITRATE 50 MCG: 50 INJECTION INTRAMUSCULAR; INTRAVENOUS at 08:08

## 2025-08-01 RX ADMIN — PROPOFOL 200 MG: 10 INJECTION, EMULSION INTRAVENOUS at 07:08

## 2025-08-01 RX ADMIN — ONDANSETRON 4 MG: 2 INJECTION INTRAMUSCULAR; INTRAVENOUS at 08:08

## 2025-08-01 RX ADMIN — ROCURONIUM BROMIDE 50 MG: 10 INJECTION, SOLUTION INTRAVENOUS at 07:08

## 2025-08-01 RX ADMIN — SODIUM CHLORIDE: 9 INJECTION, SOLUTION INTRAVENOUS at 09:08

## 2025-08-01 RX ADMIN — FENTANYL CITRATE 100 MCG: 50 INJECTION INTRAMUSCULAR; INTRAVENOUS at 07:08

## 2025-08-01 RX ADMIN — HYDROCODONE BITARTRATE AND ACETAMINOPHEN 1 TABLET: 5; 325 TABLET ORAL at 10:08

## 2025-08-01 RX ADMIN — FENTANYL CITRATE 50 MCG: 50 INJECTION INTRAMUSCULAR; INTRAVENOUS at 09:08

## 2025-08-01 NOTE — OP NOTE
OPERATIVE NOTE    DATE: 8/1/2025      PRE-OPERATIVE DIAGNOSIS: Calcaneal spur of right foot [M77.31]  Right Achilles tendinitis [M76.61]     POST-OPERATIVE DIAGNOSIS:  Same    PROCEDURE: Retrocalcaneal exostectomy with reattachment of Achilles tendon, right foot    PATHOLOGY: None    ANESTHESIA TYPE: General    HEMOSTASIS: Well-padded pneumatic thigh tourniquet set to 275 mmHg for 50 minutes    BLOOD LOSS: 10 mL    MATERIALS: 3 sonic anchors from [place]/3-0 Vicryl/3-0 nylon    INJECTABLES: 10 mL of 0.5% Marcaine plain injected in a regional block fashion, right heel, postoperatively    COMPLICATIONS: No      FINDINGS: 1. large retrocalcaneal exostosis, right foot 2. Thickening of achilles tendon, right foot     ASSISTANT SURGEON: None      SURGERY IN DETAIL: Patient was brought into the operating room.  General anesthesia administered to the patient via the anesthesia department.  A well-padded thigh tourniquet was placed to the right thigh.  Patient was transferred to the operating table in the prone position.  Patient was prepped and draped in the typical aseptic manner.  A timeout was completed to identify the proper patient and operative site.  Right lower extremity was exsanguinated and the tourniquet was lifted to 275 mmHg.  Attention was directed to the posterior aspect of the right heel.  Curvilinear type incision was made over the posterior aspect of the calcaneus.  Sharp and blunt dissection techniques were used to course through subcutaneous tissues.  All neurovascular bundles were identified and retracted, all superficial bleeders are cauterized via Bovie.  Achilles tendon was identified.  Lateral and central portion of Achilles tendon was reflected off of the posterior aspect of the calcaneus.  Care was taken to keep the medial pole intact.  This gave good visualization of the large retrocalcaneal exostosis.  Foot was placed under fluoroscopy to visualize the exostosis.  Sagittal saw was used to resect  the retrocalcaneal exostosis.  Foot was placed back under fluoroscopy to appreciate adequate resection of exostosis.  Achilles tendon was inspected, there was thickening of the anterior portion of the tendon.  Diseased/thickened portion of tendon was debrided.  Remaining portion of tendon was healthy and viable.  3 sonic anchors were placed in the posterior aspect of the calcaneus.  Sutures were used to reapproximate the Achilles tendon back to its anatomical position against the posterior aspect of the calcaneus.  Surgical site was irrigated with copious amounts normal saline.  Deep subcutaneous structures reapproximated via 3-0 Vicryl.  Skin was reapproximated with 3-0 nylon in a simple interrupted fashion.  Tourniquet was deflated at this time.  The said postop local anesthetic was infiltrated into the right heel in a regional block fashion.  Dry dressing was applied to the right heel along with a posterior splint.  Patient tolerated the procedure and anesthesia well was transferred from the operating to the cover room with vital signs stable and neurovascular status intact to the right lower extremity.  She will be discharged home, follow postop advanced directives, follow-up in clinic early next week for postop care.

## 2025-08-01 NOTE — BRIEF OP NOTE
[facility] - Surgery  Brief Operative Note    Surgery Date: 8/1/2025     Surgeons and Role:     * Earnest Fernández DPM - Primary    Assisting Surgeon: None    Pre-op Diagnosis:  Calcaneal spur of right foot [M77.31]  Right Achilles tendinitis [M76.61]    Post-op Diagnosis:  Post-Op Diagnosis Codes:     * Calcaneal spur of right foot [M77.31]     * Right Achilles tendinitis [M76.61]    Procedure(s) (LRB):  EXCISION, EXOSTOSIS, RETROCALCANEAL (Right)    Anesthesia: Choice    Operative Findings: 1.  large retrocalcaneal exostosis, right foot   2. Thickening of achilles tendon, right foot     Estimated Blood Loss: 10ml         Specimens:   Specimen (24h ago, onward)      None            * No specimens in log *        Discharge Note    OUTCOME: Patient tolerated treatment/procedure well without complication and is now ready for discharge.    DISPOSITION: Home or Self Care    FINAL DIAGNOSIS:  <principal problem not specified>    FOLLOWUP: In clinic    DISCHARGE INSTRUCTIONS:  No discharge procedures on file.

## 2025-08-01 NOTE — DISCHARGE INSTRUCTIONS
FOLLOW UP WITH DR AVILA AS SCHEDULED.  REST TODAY --- RESUME ACTIVITY AS TOLERATED TOMORROW ON CRUTCHES  RESUME CURRENT DIET  WHEN UP OR LYING DOWN ELEVATE RIGHT FOOT ON PILLOWS  USE CRUTCHES WHEN UP  CALL DR AVILA'S OFFICE FOR ANY QUESTIONS OR CONCERNS.  REPORT TO THE ER IF URGENT.    RESUME HOME MEDS AS SCHEDULED.  TAKE PAIN MEDS AS PRESCRIBED.    THANK YOU FOR CHOOSING [facility]!

## 2025-08-01 NOTE — PLAN OF CARE
Patient prepared for procedure. No complaints or concerns voiced about procedure at this time. NPO after midnight. Pt reports pain 8/10 to right foot. Constant pressure. Position adjusted for comfort. 22g IV inserted to right hand, infusing. No s/s of infiltration present. Belongings placed at the bedside. Bed locked and in lowest position to the floor. Call bell placed in reach for any needs.

## 2025-08-01 NOTE — TRANSFER OF CARE
Anesthesia Transfer of Care Note    Patient: Mena Mcdaniel    Procedure(s) Performed: Procedure(s) (LRB):  EXCISION, EXOSTOSIS, RETROCALCANEAL (Right)    Patient location: PACU    Anesthesia Type: general    Transport from OR: Transported from OR on room air with adequate spontaneous ventilation    Post pain: adequate analgesia    Post assessment: no apparent anesthetic complications    Post vital signs: stable    Level of consciousness: awake, alert and oriented    Nausea/Vomiting: no nausea/vomiting    Complications: none    Transfer of care protocol was followed      Last vitals:     /96  P 80  R 12  T 98  O2 Sat 97%%

## 2025-08-01 NOTE — ANESTHESIA PROCEDURE NOTES
Intubation    Date/Time: 8/1/2025 7:53 AM    Performed by: Ming Jeter CRNA  Authorized by: Ming Jeter CRNA    Intubation:     Induction:  Intravenous    Intubated:  Postinduction    Mask Ventilation:  Easy with oral airway    Attempts:  1    Attempted By:  CRNA    Method of Intubation:  Direct    Blade:  Duncan 2    Laryngeal View Grade: Grade I - full view of cords      Difficult Airway Encountered?: No      Complications:  None    Airway Device:  Oral endotracheal tube    Airway Device Size:  7.5    Style/Cuff Inflation:  Cuffed (inflated to minimal occlusive pressure)    Tube secured:  21    Secured at:  The lips    Placement Verified By:  Capnometry    Complicating Factors:  None    Findings Post-Intubation:  BS equal bilateral and atraumatic/condition of teeth unchanged

## 2025-08-04 NOTE — ANESTHESIA POSTPROCEDURE EVALUATION
Anesthesia Post Evaluation    Patient: Mena Mcdaniel    Procedure(s) Performed: Procedure(s) (LRB):  EXCISION, EXOSTOSIS, RETROCALCANEAL (Right)    Final Anesthesia Type: general      Patient location during evaluation: PACU  Patient participation: Yes- Able to Participate  Level of consciousness: awake and alert and oriented  Post-procedure vital signs: reviewed and stable  Pain management: adequate  Airway patency: patent    PONV status at discharge: No PONV  Anesthetic complications: no      Cardiovascular status: blood pressure returned to baseline  Respiratory status: spontaneous ventilation, unassisted and room air  Hydration status: euvolemic  Follow-up not needed.              Vitals Value Taken Time   /74 08/01/25 12:26   Temp 36.6 °C (97.8 °F) 08/01/25 12:26   Pulse 75 08/01/25 12:26   Resp 18 08/01/25 12:26   SpO2 97 % 08/01/25 12:26         Event Time   Out of Recovery 10:03:38         Pain/Fatmata Score: No data recorded

## 2025-08-05 VITALS
DIASTOLIC BLOOD PRESSURE: 74 MMHG | RESPIRATION RATE: 18 BRPM | BODY MASS INDEX: 40.48 KG/M2 | HEIGHT: 65 IN | HEART RATE: 75 BPM | OXYGEN SATURATION: 97 % | WEIGHT: 243 LBS | TEMPERATURE: 98 F | SYSTOLIC BLOOD PRESSURE: 140 MMHG

## 2025-08-26 ENCOUNTER — TELEPHONE (OUTPATIENT)
Dept: SURGERY | Facility: CLINIC | Age: 50
End: 2025-08-26
Payer: MEDICAID

## 2025-08-27 ENCOUNTER — PATIENT MESSAGE (OUTPATIENT)
Dept: SURGERY | Facility: CLINIC | Age: 50
End: 2025-08-27
Payer: MEDICAID

## (undated) DEVICE — SUT VICRYL 3-0 27 SH

## (undated) DEVICE — SPONGE SURGIFOAM GELATIN SZ50

## (undated) DEVICE — BLADE SURG #15 CARBON STEEL

## (undated) DEVICE — SPONGE DRY VIA GREEN

## (undated) DEVICE — SKIN MARKER STER DUAL TIP

## (undated) DEVICE — GLOVE SURG ULTRA TOUCH 7

## (undated) DEVICE — STRAP KNEE & BODY DISP 4X34IN

## (undated) DEVICE — APPLICATOR CHLORAPREP ORN 26ML

## (undated) DEVICE — DRESSING GAUZE 6PLY 4X4

## (undated) DEVICE — SUT MONOCYRL 4-0 PS2 UND

## (undated) DEVICE — JELLY SURGILUBE CARBMR FRE 2OZ

## (undated) DEVICE — ELECTRODE FOAM 535 TEARDROP

## (undated) DEVICE — PAD DERMAPROX LG 11X15X.5IN

## (undated) DEVICE — SYR 10CC LUER LOCK

## (undated) DEVICE — BANDAGE GAUZE COT STRL 4.5X4.1

## (undated) DEVICE — PACK ELITE MINIVIEW DRAPE

## (undated) DEVICE — PENCIL SMK EVAC CONNECTOR 10FT

## (undated) DEVICE — GLOVE BIOGEL ECLIPSE SZ 7.5

## (undated) DEVICE — TRAY MAJOR ORTHO SPILT SURG

## (undated) DEVICE — LINER SUCTION CANNISTER REGUGA

## (undated) DEVICE — CLEANER CAUT TIP STRL 2X2IN

## (undated) DEVICE — UNDERPAD DISPOSABLE 30X30IN

## (undated) DEVICE — DRAPE UNDERBUTTOCKS PCH STRL

## (undated) DEVICE — SOL IRRI STRL WATER 1000ML

## (undated) DEVICE — TIP SUCTION YANKAUER

## (undated) DEVICE — LINER GLOVE POWDERFREE SZ 7

## (undated) DEVICE — LINER GLOVE POWDERFREE SZ 6.5

## (undated) DEVICE — COUNTER NDL DBL MAG 100

## (undated) DEVICE — TUBE SUC UNIVERSAL .25XIN 6FT

## (undated) DEVICE — ELECTRODE REM PLYHSV RETURN 9

## (undated) DEVICE — SUT 3-0 VICRYL / SH (J416)

## (undated) DEVICE — HEMOSTAT SURGICEL 2X3IN

## (undated) DEVICE — BLANKET WARMING UPPER BODY

## (undated) DEVICE — SYR IRRIGATION BULB STER 60ML

## (undated) DEVICE — SNARE SNAREMASTER OVAL 25MM

## (undated) DEVICE — TUBING IRRIGATION W/ AIR

## (undated) DEVICE — TIP YANKAUERS BULB NO VENT

## (undated) DEVICE — SUT SILK 0 SH 30IN BLK BR

## (undated) DEVICE — CONNECTOR TORRENT SCP OLYMPUS

## (undated) DEVICE — GLOVE SURGICAL LATEX SZ 6.5

## (undated) DEVICE — CONNECTOR WATERJET ENDO

## (undated) DEVICE — Device

## (undated) DEVICE — CAUTERY PUSHBUTTON PENCIL

## (undated) DEVICE — BOOT WALKING XCELTRAX MED

## (undated) DEVICE — SHEARS HARMONIC CRVD 9 CM

## (undated) DEVICE — SEE MEDLINE ITEM 153215

## (undated) DEVICE — HEADREST FOAM DBL SLT HIDENS

## (undated) DEVICE — ELECTRODE NDL EDGE 2 5/6IN

## (undated) DEVICE — KIT VIA CUSTOM PROCEDURE

## (undated) DEVICE — DRAPE T EXTRM SURG 121X128X90

## (undated) DEVICE — PACK SURG LITHOTOMY 3 SIRUS

## (undated) DEVICE — TUBING OXYGEN CONNECT BUBBLE

## (undated) DEVICE — COVER OVERHEAD SURG LT BLUE

## (undated) DEVICE — TRAY SKIN SCRUB WET 4 COMPART

## (undated) DEVICE — NDL HYPO REG 25G X 1 1/2

## (undated) DEVICE — SOL NACL IRR 1000ML BTL

## (undated) DEVICE — HEMOSTAT SURGICEL 4X8IN

## (undated) DEVICE — SYR SLIP TIP 60 CC DISP

## (undated) DEVICE — KIT BIOGUARD AIR WTR SUC VALVE

## (undated) DEVICE — GOWN SURGICAL BRTHBL XL

## (undated) DEVICE — SUT VICRYL+ 27 UR-6 VIOL

## (undated) DEVICE — POSITIONER PINKPROTECT ARC MED

## (undated) DEVICE — SPONGE COTTON TRAY 4X4IN

## (undated) DEVICE — DRAPE INVISISHIELD TOWEL SMALL

## (undated) DEVICE — BLANKET UPPER BODY 78.7X29.9IN

## (undated) DEVICE — GOWN NONREINF SET-IN SLV XL

## (undated) DEVICE — STRIP MEDI WND CLSR 1/2X4IN

## (undated) DEVICE — PAD ABDOMINAL STERILE 5X9IN

## (undated) DEVICE — UNDERGLOVES BIOGEL PI SIZE 7.5

## (undated) DEVICE — UNDERPAD DELUXE FLUFF 30X30IN

## (undated) DEVICE — ADHESIVE DERMABOND ADVANCED

## (undated) DEVICE — ELECTRODE MEDI-TRACE 855 FOAM

## (undated) DEVICE — BLADE MEDIUM 9MM X 25MM

## (undated) DEVICE — SUT 4.0 ETHILON

## (undated) DEVICE — SONIC TIP